# Patient Record
Sex: FEMALE | Race: WHITE | NOT HISPANIC OR LATINO | Employment: FULL TIME | URBAN - METROPOLITAN AREA
[De-identification: names, ages, dates, MRNs, and addresses within clinical notes are randomized per-mention and may not be internally consistent; named-entity substitution may affect disease eponyms.]

---

## 2017-03-21 ENCOUNTER — TRANSFERRED RECORDS (OUTPATIENT)
Dept: HEALTH INFORMATION MANAGEMENT | Facility: CLINIC | Age: 52
End: 2017-03-21
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
## 2018-08-08 ENCOUNTER — OFFICE VISIT (OUTPATIENT)
Dept: FAMILY MEDICINE | Facility: CLINIC | Age: 53
End: 2018-08-08
Payer: COMMERCIAL

## 2018-08-08 VITALS
HEART RATE: 77 BPM | DIASTOLIC BLOOD PRESSURE: 83 MMHG | BODY MASS INDEX: 38.71 KG/M2 | TEMPERATURE: 97.5 F | SYSTOLIC BLOOD PRESSURE: 129 MMHG | RESPIRATION RATE: 12 BRPM | HEIGHT: 71 IN | WEIGHT: 276.5 LBS | OXYGEN SATURATION: 100 %

## 2018-08-08 DIAGNOSIS — Z11.4 SCREENING FOR HIV (HUMAN IMMUNODEFICIENCY VIRUS): ICD-10-CM

## 2018-08-08 DIAGNOSIS — Z00.00 ROUTINE GENERAL MEDICAL EXAMINATION AT A HEALTH CARE FACILITY: Primary | ICD-10-CM

## 2018-08-08 DIAGNOSIS — B00.1 COLD SORE: ICD-10-CM

## 2018-08-08 DIAGNOSIS — Z23 NEED FOR PROPHYLACTIC VACCINATION WITH TETANUS-DIPHTHERIA (TD): ICD-10-CM

## 2018-08-08 DIAGNOSIS — Z12.31 VISIT FOR SCREENING MAMMOGRAM: ICD-10-CM

## 2018-08-08 DIAGNOSIS — Z12.11 SCREEN FOR COLON CANCER: ICD-10-CM

## 2018-08-08 PROCEDURE — 99396 PREV VISIT EST AGE 40-64: CPT | Performed by: NURSE PRACTITIONER

## 2018-08-08 RX ORDER — VALACYCLOVIR HYDROCHLORIDE 1 G/1
2000 TABLET, FILM COATED ORAL 2 TIMES DAILY
Qty: 4 TABLET | Refills: 4 | Status: SHIPPED | OUTPATIENT
Start: 2018-08-08 | End: 2019-09-06

## 2018-08-08 NOTE — TELEPHONE ENCOUNTER
FUTURE VISIT INFORMATION      FUTURE VISIT INFORMATION:    Date: 8/13    Time: 11;00    Location:   REFERRAL INFORMATION:    Referring provider:  SELF    Referring providers clinic:      Reason for visit/diagnosis  LEFT ANKLE PAIN    RECORDS REQUESTED FROM:       Clinic name Comments Records Status Imaging Status   [facility] RECORDS REQUESTED                                     RECORDS STATUS

## 2018-08-08 NOTE — MR AVS SNAPSHOT
After Visit Summary   8/8/2018    Jacqui Grijalva    MRN: [id number]           Patient Information     Date Of Birth          1965        Visit Information        Provider Department      8/8/2018 1:30 PM Bijal Browne APRN [facility]        Today's Diagnoses     Cold sore    -  1    Routine general medical examination at a health care facility        Visit for screening mammogram        Screen for colon cancer        Screening for HIV (human immunodeficiency virus)        Need for prophylactic vaccination with tetanus-diphtheria (TD)          Care Instructions      Preventive Health Recommendations  Female Ages 50 - 64    Yearly exam: See your health care provider every year in order to  o Review health changes.   o Discuss preventive care.    o Review your medicines if your doctor has prescribed any.      Get a Pap test every three years (unless you have an abnormal result and your provider advises testing more often).    If you get Pap tests with HPV test, you only need to test every 5 years, unless you have an abnormal result.     You do not need a Pap test if your uterus was removed (hysterectomy) and you have not had cancer.    You should be tested each year for STDs (sexually transmitted diseases) if you're at risk.     Have a mammogram every 1 to 2 years.    Have a colonoscopy at age 50, or have a yearly FIT test (stool test). These exams screen for colon cancer.      Have a cholesterol test every 5 years, or more often if advised.    Have a diabetes test (fasting glucose) every three years. If you are at risk for diabetes, you should have this test more often.     If you are at risk for osteoporosis (brittle bone disease), think about having a bone density scan (DEXA).    Shots: Get a flu shot each year. Get a tetanus shot every 10 years.    Nutrition:     Eat at least 5 servings of fruits and vegetables each day.    Eat whole-grain bread, whole-wheat pasta and  "brown rice instead of white grains and rice.    Get adequate Calcium and Vitamin D.     Lifestyle    Exercise at least 150 minutes a week (30 minutes a day, 5 days a week). This will help you control your weight and prevent disease.    Limit alcohol to one drink per day.    No smoking.     Wear sunscreen to prevent skin cancer.     See your dentist every six months for an exam and cleaning.    See your eye doctor every 1 to 2 years.            Follow-ups after your visit        Future tests that were ordered for you today     Open Future Orders        Priority Expected Expires Ordered    MA SCREENING DIGITAL BILAT - Future  (s+30) Routine  8/7/2019 8/8/2018    Lipid panel reflex to direct LDL Fasting Routine  8/8/2019 8/8/2018    **Comprehensive metabolic panel FUTURE 1yr Routine 7/9/2019 8/8/2019 8/8/2018    Fecal colorectal cancer screen (FIT) Routine 8/29/2018 10/31/2018 8/8/2018            Who to contact     If you have questions or need follow up information about today's clinic visit or your schedule please contact [facility] directly at [phone].  Normal or non-critical lab and imaging results will be communicated to you by Peter Blueberryhart, letter or phone within 4 business days after the clinic has received the results. If you do not hear from us within 7 days, please contact the clinic through [employer] or phone. If you have a critical or abnormal lab result, we will notify you by phone as soon as possible.  Submit refill requests through [employer] or call your pharmacy and they will forward the refill request to us. Please allow 3 business days for your refill to be completed.          Additional Information About Your Visit        [employer] Information     [employer] lets you send messages to your doctor, view your test results, renew your prescriptions, schedule appointments and more. To sign up, go to [email or web address] . Click on \"Log in\" on the left side of the screen, which will take you to " "the Welcome page. Then click on \"Sign up Now\" on the right side of the page.     You will be asked to enter the access code listed below, as well as some personal information. Please follow the directions to create your username and password.     Your access code is: [email or web address]  Expires: 2018  1:24 PM     Your access code will  in 90 days. If you need help or a new code, please call your [facility] or [phone].        Care EveryWhere ID     This is your Care EveryWhere ID. This could be used by other organizations to access your [place] medical records  [id number]        Your Vitals Were     Pulse Temperature Respirations Height Pulse Oximetry Breastfeeding?    77 97.5  F (36.4  C) (Oral) 12 5' 11.25\" (1.81 m) 100% No    BMI (Body Mass Index)                   38.29 kg/m2            Blood Pressure from Last 3 Encounters:   18 129/83   16 116/78   16 136/78    Weight from Last 3 Encounters:   18 276 lb 8 oz (125.4 kg)   10/19/16 268 lb (121.6 kg)   16 265 lb 14.4 oz (120.6 kg)                 Today's Medication Changes          These changes are accurate as of 18  1:59 PM.  If you have any questions, ask your nurse or doctor.               Start taking these medicines.        Dose/Directions    valACYclovir 1000 mg tablet   Commonly known as:  VALTREX   Used for:  Cold sore   Started by:  Bijal Browne APRN CNP        Dose:  2000 mg   Take 2 tablets (2,000 mg) by mouth 2 times daily   Quantity:  4 tablet   Refills:  4            Where to get your medicines      These medications were sent to [facility] [address] [place] [address] & [facility]  [address] [place] [address]     Phone:  [phone]     valACYclovir 1000 mg tablet                Primary Care Provider Office Phone # Fax #    Lucia VIRAL Contreras [phone]       [address] [place] [facility]  [place] [address]      "   Equal Access to Services     [facility] HospitalLISA : Hadii aad ku hadlucíajerica Smith, waroyada luqadaha, qaybta simongagankathya johnson. So [facility] [phone].    ATENCIÓN: Si habla español, tiene a begum disposición servicios gratuitos de asistencia lingüística. Llame al [phone].    We comply with applicable federal civil rights laws and [place] laws. We do not discriminate on the basis of race, color, national origin, age, disability, sex, sexual orientation, or gender identity.            Thank you!     Thank you for choosing [facility]  for your care. Our goal is always to provide you with excellent care. Hearing back from our patients is one way we can continue to improve our services. Please take a few minutes to complete the written survey that you may receive in the mail after your visit with us. Thank you!             Your Updated Medication List - Protect others around you: Learn how to safely use, store and throw away your medicines at www.disposemymeds.org.          This list is accurate as of 8/8/18  1:59 PM.  Always use your most recent med list.                   Brand Name Dispense Instructions for use Diagnosis    GLUCOSAMINE CHONDROITIN ADV PO           IBUPROFEN PO           Multi-vitamin Tabs tablet      Take 1 tablet by mouth daily        valACYclovir 1000 mg tablet    VALTREX    4 tablet    Take 2 tablets (2,000 mg) by mouth 2 times daily    Cold sore

## 2018-08-13 ENCOUNTER — TELEPHONE (OUTPATIENT)
Dept: ORTHOPEDICS | Facility: CLINIC | Age: 53
End: 2018-08-13

## 2018-08-13 ENCOUNTER — PRE VISIT (OUTPATIENT)
Dept: ORTHOPEDICS | Facility: CLINIC | Age: 53
End: 2018-08-13

## 2018-08-13 ENCOUNTER — MYC REFILL (OUTPATIENT)
Dept: FAMILY MEDICINE | Facility: CLINIC | Age: 53
End: 2018-08-13

## 2018-08-13 ENCOUNTER — RADIANT APPOINTMENT (OUTPATIENT)
Dept: MAMMOGRAPHY | Facility: CLINIC | Age: 53
End: 2018-08-13
Attending: NURSE PRACTITIONER
Payer: COMMERCIAL

## 2018-08-13 ENCOUNTER — RADIANT APPOINTMENT (OUTPATIENT)
Dept: GENERAL RADIOLOGY | Facility: CLINIC | Age: 53
End: 2018-08-13
Attending: FAMILY MEDICINE
Payer: COMMERCIAL

## 2018-08-13 ENCOUNTER — OFFICE VISIT (OUTPATIENT)
Dept: ORTHOPEDICS | Facility: CLINIC | Age: 53
End: 2018-08-13
Payer: COMMERCIAL

## 2018-08-13 VITALS — HEIGHT: 71 IN | RESPIRATION RATE: 16 BRPM | BODY MASS INDEX: 38.64 KG/M2 | WEIGHT: 276 LBS

## 2018-08-13 DIAGNOSIS — M25.572 PAIN OF JOINT OF LEFT ANKLE AND FOOT: ICD-10-CM

## 2018-08-13 DIAGNOSIS — Z12.31 VISIT FOR SCREENING MAMMOGRAM: ICD-10-CM

## 2018-08-13 DIAGNOSIS — B00.1 COLD SORE: ICD-10-CM

## 2018-08-13 DIAGNOSIS — M25.572 PAIN OF JOINT OF LEFT ANKLE AND FOOT: Primary | ICD-10-CM

## 2018-08-13 PROCEDURE — 77067 SCR MAMMO BI INCL CAD: CPT

## 2018-08-13 RX ORDER — VALACYCLOVIR HYDROCHLORIDE 1 G/1
2000 TABLET, FILM COATED ORAL 2 TIMES DAILY
Qty: 4 TABLET | Refills: 4 | Status: CANCELLED | OUTPATIENT
Start: 2018-08-13

## 2018-08-13 NOTE — MR AVS SNAPSHOT
After Visit Summary   8/13/2018    Jacqui Grijalva    MRN: [id number]           Patient Information     Date Of Birth          1965        Visit Information        Provider Department      8/13/2018 11:00 AM Rogelio Grimaldo MD [facility]        Today's Diagnoses     Pain of joint of left ankle and foot    -  1       Follow-ups after your visit        Additional Services     ORTHOTICS REFERRAL       **This referral order prints off in the [facility]  (Orthotics & Prosthetics) Central Scheduling Office**    The [facility] Central Scheduling Staff will contact the patient to schedule appointments.     Central Scheduling Contact Information: [phone] ([place])    Orthotics:   L ankle osteoarthritis:      Please be aware that coverage of these services is subject to the terms and limitations of your health insurance plan.  Call member services at your health plan with any benefit or coverage questions.      Please bring the following to your appointment:    >>   Any x-rays, CTs or MRIs which have been performed.  Contact the facility where they were done to arrange for  prior to your scheduled appointment.    >>   List of current medications   >>   This referral request   >>   Any documents/labs given to you for this referral            PHYSICAL THERAPY REFERRAL (Internal)       Physical Therapy Referral                  Your next 10 appointments already scheduled     Aug 13, 2018  1:30 PM CDT   MA SCREENING DIGITAL BILATERAL with [id number]   [facility] ([facility])    [address] [place] [address], [address]  [facility] [address] [phone]           Do not use any powder, lotion or deodorant under your arms or on your breast. If you do, we will ask you to remove it before your exam.  Wear comfortable, two-piece clothing.  If you have any allergies, tell your care team.  Bring any previous mammograms from other facilities or  "have them mailed to the breast center.            Aug 20, 2018  1:00 PM CDT   (Arrive by 12:45 PM)   Procedure with Rogelio Grimaldo MD   [facility] ([facility] [place])    [address] [place] [address] [phone]              Who to contact     Please call your clinic at [phone] to:    Ask questions about your health    Make or cancel appointments    Discuss your medicines    Learn about your test results    Speak to your doctor            Additional Information About Your Visit        [employer] Information     Define My Style is an electronic gateway that provides easy, online access to your medical records. With [employer] My Style, you can request a clinic appointment, read your test results, renew a prescription or communicate with your care team.     To sign up for [employer] visit the website at [email or web address]   You will be asked to enter the access code listed below, as well as some personal information. Please follow the directions to create your username and password.     Your access code is: [email or web address]  Expires: 2018  1:24 PM     Your access code will  in 90 days. If you need help or a new code, please contact your [facility] or call [phone] for assistance.        Care EveryWhere ID     This is your Care EveryWhere ID. This could be used by other organizations to access your [place] medical records  [id number]        Your Vitals Were     Respirations Height BMI (Body Mass Index)             16 5' 11.25\" (1.81 m) 38.22 kg/m2          Blood Pressure from Last 3 Encounters:   18 129/83   16 116/78   16 136/78    Weight from Last 3 Encounters:   18 276 lb (125.2 kg)   18 276 lb 8 oz (125.4 kg)   10/19/16 268 lb (121.6 kg)              We Performed the Following     ORTHOTICS REFERRAL     PHYSICAL THERAPY REFERRAL (Internal)        Primary Care Provider Office Phone # " Fax #    Lucia Contreras [phone]       [place] [address] [place] [address]        Equal Access to Services     DAIN CROWELL : Hayder bella ku joannao Soomaali, waaxda luqadaha, qaybta kaalmada adeegyada, kathya najeran jenny skinner kanika west. So [facility] [phone].    ATENCIÓN: Si habla español, tiene a begum disposición servicios gratuitos de asistencia lingüística. Llame al [phone].    We comply with applicable federal civil rights laws and [place] laws. We do not discriminate on the basis of race, color, national origin, age, disability, sex, sexual orientation, or gender identity.            Thank you!     Thank you for choosing [facility]  for your care. Our goal is always to provide you with excellent care. Hearing back from our patients is one way we can continue to improve our services. Please take a few minutes to complete the written survey that you may receive in the mail after your visit with us. Thank you!             Your Updated Medication List - Protect others around you: Learn how to safely use, store and throw away your medicines at www.disposemymeds.org.          This list is accurate as of 8/13/18 11:57 AM.  Always use your most recent med list.                   Brand Name Dispense Instructions for use Diagnosis    GLUCOSAMINE CHONDROITIN ADV PO           IBUPROFEN PO           Multi-vitamin Tabs tablet      Take 1 tablet by mouth daily        valACYclovir 1000 mg tablet    VALTREX    4 tablet    Take 2 tablets (2,000 mg) by mouth 2 times daily    Cold sore

## 2018-08-13 NOTE — TELEPHONE ENCOUNTER
[facility] Call Center    Phone Message    May a detailed message be left on voicemail: yes    Reason for Call: Other: Jacqui needs to cancel/reschedule her Ultra Sounded Guided injection currently on 8.20     Action Taken: Message routed to:  [facility] ([facility]): clinic coord sport

## 2018-08-13 NOTE — LETTER
8/13/2018      RE: Jacqui Grijalva  [address]  [address]  [place] [address]        Subjective:   Jacqui Grijalva is a 53 year old female who is c/o chronic left medial ankle pain.   Prior hx of left ankle fx as a child. She states that pain is interfering with her daily activities now and has progressed and is bothering her despite using medications.    Her pain started in the L ankle as a child after she fractured the joint. She has noted she always had some decreased ROM since that fracture as a kid.  Activity limited since her injury.  Activity over the past few walking and biking walks when she can.    Medial ankle pain worse with activity and cold month. Sharp pain. No swelling or clicking.  She has taken medications wiithout relief. She has tried wearing better shoes.   No physical therapy. No injections or imaging. Xray 10 years ago.     Background:   Date of injury: None  Duration of symptoms: years  Mechanism of Injury: Chronic; Unknown   Intensity: 2/10 at rest, 5/10 with activity   Aggravating factors: Activity, cold, exercise  Relieving Factors: Support, tylenol, ibuprofen   Prior Evaluation: Prior Physician Evalutation: [facility], xrays 10 years ago. Considered shots or surgery.    PAST MEDICAL, SOCIAL, SURGICAL AND FAMILY HISTORY: She  has a past medical history of Post-traumatic osteoarthritis of right knee (7/11/2016). She also has no past medical history of Amblyopia; Diabetes (H); Diabetic retinopathy (H); Glaucoma; Hypertension; Macular degeneration; Nonsenile cataract; Retinal detachment; Strabismus; or Uveitis.  She  has no past surgical history on file.  Her family history includes Breast Cancer in her mother; Cancer in her maternal grandmother; Coronary Artery Disease in her father; Diabetes in her father; Eye Surgery in her father and mother; GERD in her mother; Heart Failure in her father; Hypertension in her mother. There is no history of Glaucoma or Macular Degeneration.  She  "reports that she has never smoked. She has never used smokeless tobacco. She reports that she drinks alcohol. She reports that she does not use illicit drugs.    ALLERGIES: She is allergic to penicillins.    CURRENT MEDICATIONS: She has a current medication list which includes the following prescription(s): ibuprofen, misc natural products, multivitamin, therapeutic with minerals, and valacyclovir.     REVIEW OF SYSTEMS: 3 point review of systems is negative except as noted above.     Exam:   Resp 16  Ht 5' 11.25\" (1.81 m)  Wt 276 lb (125.2 kg)  BMI 38.22 kg/m2     CONSTITUTIONAL: healthy, alert and no distress  HEAD: Normocephalic. No masses, lesions, tenderness or abnormalities  SKIN: no suspicious lesions or rashes  GAIT: normal  NEUROLOGIC: Non-focal  PSYCHIATRIC: affect normal/bright and mentation appears normal.    MUSCULOSKELETAL:   Left ankle  Inspection-Bilateral 1+ LE edema  Palpation--nontender atfl, cfl,nontender anterior ankle or midfoot, nontender deltoid or navicular. Tender diffusely medial ankle  AROM mild reduction in subtalar motion compared to R  Strength-no pain with resisted inversion, eversion, dorsiflexion or plantarflexion  1+ DP  Nl sensation    XR L ankle severe degnerative changes of the L ankle     Assessment/Plan:   Chronic L ankle pain.  --L post-traumatic osteoarthritis    Plan  --we discussed options including L ankle injection, bracing, and PT as well as surgical referral.  She will f/u for a L ankle U/S guided injection and then start PT. She will be fitted with an AZ brace or similar orthotic in the meantime. We discussed optomizing weight and activity modification. If not controlled, we could consider surgical referral.    Rogelio Grimaldo MD CAQ      "

## 2018-08-13 NOTE — PROGRESS NOTES
Subjective:   Jacqui Grijalva is a 53 year old female who is c/o chronic left medial ankle pain.   Prior hx of left ankle fx as a child. She states that pain is interfering with her daily activities now and has progressed and is bothering her despite using medications.    Her pain started in the L ankle as a child after she fractured the joint. She has noted she always had some decreased ROM since that fracture as a kid.  Activity limited since her injury.  Activity over the past few walking and biking walks when she can.    Medial ankle pain worse with activity and cold month. Sharp pain. No swelling or clicking.  She has taken medications wiithout relief. She has tried wearing better shoes.   No physical therapy. No injections or imaging. Xray 10 years ago.     Background:   Date of injury: None  Duration of symptoms: years  Mechanism of Injury: Chronic; Unknown   Intensity: 2/10 at rest, 5/10 with activity   Aggravating factors: Activity, cold, exercise  Relieving Factors: Support, tylenol, ibuprofen   Prior Evaluation: Prior Physician Evalutation: [facility], xrays 10 years ago. Considered shots or surgery.    PAST MEDICAL, SOCIAL, SURGICAL AND FAMILY HISTORY: She  has a past medical history of Post-traumatic osteoarthritis of right knee (7/11/2016). She also has no past medical history of Amblyopia; Diabetes (H); Diabetic retinopathy (H); Glaucoma; Hypertension; Macular degeneration; Nonsenile cataract; Retinal detachment; Strabismus; or Uveitis.  She  has no past surgical history on file.  Her family history includes Breast Cancer in her mother; Cancer in her maternal grandmother; Coronary Artery Disease in her father; Diabetes in her father; Eye Surgery in her father and mother; GERD in her mother; Heart Failure in her father; Hypertension in her mother. There is no history of Glaucoma or Macular Degeneration.  She reports that she has never smoked. She has never used smokeless tobacco. She reports that she drinks  "alcohol. She reports that she does not use illicit drugs.    ALLERGIES: She is allergic to penicillins.    CURRENT MEDICATIONS: She has a current medication list which includes the following prescription(s): ibuprofen, misc natural products, multivitamin, therapeutic with minerals, and valacyclovir.     REVIEW OF SYSTEMS: 3 point review of systems is negative except as noted above.     Exam:   Resp 16  Ht 5' 11.25\" (1.81 m)  Wt 276 lb (125.2 kg)  BMI 38.22 kg/m2     CONSTITUTIONAL: healthy, alert and no distress  HEAD: Normocephalic. No masses, lesions, tenderness or abnormalities  SKIN: no suspicious lesions or rashes  GAIT: normal  NEUROLOGIC: Non-focal  PSYCHIATRIC: affect normal/bright and mentation appears normal.    MUSCULOSKELETAL:   Left ankle  Inspection-Bilateral 1+ LE edema  Palpation--nontender atfl, cfl,nontender anterior ankle or midfoot, nontender deltoid or navicular. Tender diffusely medial ankle  AROM mild reduction in subtalar motion compared to R  Strength-no pain with resisted inversion, eversion, dorsiflexion or plantarflexion  1+ DP  Nl sensation    XR L ankle severe degnerative changes of the L ankle     Assessment/Plan:   Chronic L ankle pain.  --L post-traumatic osteoarthritis    Plan  --we discussed options including L ankle injection, bracing, and PT as well as surgical referral.  She will f/u for a L ankle U/S guided injection and then start PT. She will be fitted with an AZ brace or similar orthotic in the meantime. We discussed optomizing weight and activity modification. If not controlled, we could consider surgical referral.    Rogelio Grimaldo MD CAQ      "

## 2018-08-14 NOTE — TELEPHONE ENCOUNTER
Duplicate - 5 refills sent 8/8 - sent mychart encouraging patient to contact pharmacy    Closing encounter - no further actions needed at this time    Armaan Mendez RN

## 2018-08-14 NOTE — TELEPHONE ENCOUNTER
Message from MyChart:  Original authorizing provider: KAYLIE Latham CNP    Jacqui Grijalva would like a refill of the following medications:  valACYclovir (VALTREX) 1000 mg tablet [KAYLIE Latham CNP]    Preferred pharmacy: [facility] [address] [place] [address] & [address]    Comment:

## 2018-08-14 NOTE — TELEPHONE ENCOUNTER
"Requested Prescriptions   Pending Prescriptions Disp Refills     valACYclovir (VALTREX) 1000 mg tablet 4 tablet 4    Last Written Prescription Date:  08/08/2018  Last Fill Quantity: 4  # refills: 4   Last office visit: 8/8/2018 with prescribing provider:  08/08/2018   Future Office Visit:   Sig: Take 2 tablets (2,000 mg) by mouth 2 times daily    Antivirals for Herpes Protocol Failed    8/14/2018  9:39 AM       Failed - Normal serum creatinine on file in past 12 months    No lab results found.         Passed - Patient is age 12 or older       Passed - Recent (12 mo) or future (30 days) visit within the authorizing provider's specialty    Patient had office visit in the last 12 months or has a visit in the next 30 days with authorizing provider or within the authorizing provider's specialty.  See \"Patient Info\" tab in inbasket, or \"Choose Columns\" in Meds & Orders section of the refill encounter.            "

## 2018-08-15 ENCOUNTER — HOSPITAL ENCOUNTER (OUTPATIENT)
Facility: CLINIC | Age: 53
Setting detail: SPECIMEN
Discharge: HOME | End: 2018-08-15
Admitting: NURSE PRACTITIONER
Payer: COMMERCIAL

## 2018-08-15 DIAGNOSIS — Z00.00 ROUTINE GENERAL MEDICAL EXAMINATION AT A HEALTH CARE FACILITY: ICD-10-CM

## 2018-08-15 LAB
ALBUMIN SERPL-MCNC: 3.5 G/DL (ref 3.4–5)
ALP SERPL-CCNC: 68 U/L (ref 40–150)
ALT SERPL W P-5'-P-CCNC: 20 U/L (ref 0–50)
ANION GAP SERPL CALCULATED.3IONS-SCNC: 8 MMOL/L (ref 3–14)
AST SERPL W P-5'-P-CCNC: 13 U/L (ref 0–45)
BILIRUB SERPL-MCNC: 0.3 MG/DL (ref 0.2–1.3)
BUN SERPL-MCNC: 10 MG/DL (ref 7–30)
CALCIUM SERPL-MCNC: 8.6 MG/DL (ref 8.5–10.1)
CHLORIDE SERPL-SCNC: 107 MMOL/L (ref 94–109)
CHOLEST SERPL-MCNC: 203 MG/DL
CO2 SERPL-SCNC: 28 MMOL/L (ref 20–32)
CREAT SERPL-MCNC: 0.93 MG/DL (ref 0.52–1.04)
GFR SERPL CREATININE-BSD FRML MDRD: 63 ML/MIN/1.7M2
GLUCOSE SERPL-MCNC: 86 MG/DL (ref 70–99)
HDLC SERPL-MCNC: 58 MG/DL
LDLC SERPL CALC-MCNC: 130 MG/DL
NONHDLC SERPL-MCNC: 145 MG/DL
POTASSIUM SERPL-SCNC: 4.3 MMOL/L (ref 3.4–5.3)
PROT SERPL-MCNC: 6.8 G/DL (ref 6.8–8.8)
SODIUM SERPL-SCNC: 143 MMOL/L (ref 133–144)
TRIGL SERPL-MCNC: 76 MG/DL

## 2018-08-15 PROCEDURE — 82274 ASSAY TEST FOR BLOOD FECAL: CPT | Performed by: NURSE PRACTITIONER

## 2018-08-15 PROCEDURE — 36415 COLL VENOUS BLD VENIPUNCTURE: CPT | Performed by: NURSE PRACTITIONER

## 2018-08-15 PROCEDURE — 80061 LIPID PANEL: CPT | Performed by: NURSE PRACTITIONER

## 2018-08-15 PROCEDURE — 80053 COMPREHEN METABOLIC PANEL: CPT | Performed by: NURSE PRACTITIONER

## 2018-08-19 DIAGNOSIS — Z12.11 SCREEN FOR COLON CANCER: ICD-10-CM

## 2018-08-19 LAB — HEMOCCULT STL QL IA: NEGATIVE

## 2018-08-27 ENCOUNTER — OFFICE VISIT (OUTPATIENT)
Dept: ORTHOPEDICS | Facility: CLINIC | Age: 53
End: 2018-08-27
Payer: COMMERCIAL

## 2018-08-27 VITALS — HEIGHT: 71 IN | RESPIRATION RATE: 16 BRPM | BODY MASS INDEX: 38.64 KG/M2 | WEIGHT: 276 LBS

## 2018-08-27 DIAGNOSIS — M19.072 LOCALIZED OSTEOARTHRITIS OF ANKLE, LEFT: Primary | ICD-10-CM

## 2018-08-27 RX ORDER — TRIAMCINOLONE ACETONIDE 40 MG/ML
40 INJECTION, SUSPENSION INTRA-ARTICULAR; INTRAMUSCULAR ONCE
Qty: 1 ML | Refills: 0 | OUTPATIENT
Start: 2018-08-27 | End: 2018-08-27

## 2018-08-27 NOTE — MR AVS SNAPSHOT
"              After Visit Summary   8/27/2018    Jacqui Grijalva    MRN: [id number]           Patient Information     Date Of Birth          1965        Visit Information        Provider Department      8/27/2018 11:30 AM Rogelio Grimaldo MD [facility] Sports Medicine        Today's Diagnoses     Localized osteoarthritis of ankle, left    -  1       Follow-ups after your visit        Additional Services     PHYSICAL THERAPY REFERRAL       *This therapy referral will be filtered to a centralized scheduling office at [facility] and the patient will receive a call to schedule an appointment at a [place] location most convenient for them. *  L ankle OA. eval and treat for ROM and strengthening.    [facility] provides Physical Therapy evaluation and treatment and many specialty services across the [place] system.  If requesting a specialty program, please choose from the list below.    If you have not heard from the scheduling office within 2 business days, please call [phone] for all locations, with the exception of [place], please call [phone] and [facility], please call [phone]  Treatment: Evaluation & Treatment  Special Instructions/Modalities:     Please be aware that coverage of these services is subject to the terms and limitations of your health insurance plan.  Call member services at your health plan with any benefit or coverage questions.      **Note to Provider:  If you are referring outside of [place] for the therapy appointment, please list the name of the location in the \"special instructions\" above, print the referral and give to the patient to schedule the appointment.                  Your next 10 appointments already scheduled     Sep 07, 2018  3:40 PM CDT   (Arrive by 3:25 PM)   PORSCHE Extremity with Clayton Lemos   [facility] Physical Therapy [facility] ([facility])    [address] [place] [address] " "[facility] [address] [phone]              Who to contact     Please call your clinic at [phone] to:    Ask questions about your health    Make or cancel appointments    Discuss your medicines    Learn about your test results    Speak to your doctor            Additional Information About Your Visit        MyChart Information     [employer] gives you secure access to your electronic health record. If you see a primary care provider, you can also send messages to your care team and make appointments. If you have questions, please call your primary care clinic.  If you do not have a primary care provider, please call [phone] and they will assist you.      [employer] is an electronic gateway that provides easy, online access to your medical records. With [employer], you can request a clinic appointment, read your test results, renew a prescription or communicate with your care team.     To access your existing account, please contact your [facility] or call [phone] for assistance.        Care EveryWhere ID     This is your Care EveryWhere ID. This could be used by other organizations to access your [place] medical records  [id number]        Your Vitals Were     Respirations Height BMI (Body Mass Index)             16 1.81 m (5' 11.25\") 38.22 kg/m2          Blood Pressure from Last 3 Encounters:   08/08/18 129/83   08/19/16 116/78   06/30/16 136/78    Weight from Last 3 Encounters:   08/27/18 125.2 kg (276 lb)   08/13/18 125.2 kg (276 lb)   08/08/18 125.4 kg (276 lb 8 oz)              We Performed the Following      ARTHROCENTESIS ASPIR&/INJ INTERM JT/BURS W/US     PHYSICAL THERAPY REFERRAL     TRIAMCINOLONE ACET INJ NOS          Today's Medication Changes          These changes are accurate as of 8/27/18  3:20 PM.  If you have any questions, ask your nurse or doctor.               Start taking these medicines.        Dose/Directions    triamcinolone acetonide " 40 MG/ML injection   Commonly known as:  KENALOG   Used for:  Localized osteoarthritis of ankle, left        Dose:  40 mg   1 mL (40 mg) by INTRA-ARTICULAR route once for 1 dose   Quantity:  1 mL   Refills:  0            Where to get your medicines      Some of these will need a paper prescription and others can be bought over the counter.  Ask your nurse if you have questions.     You don't need a prescription for these medications     triamcinolone acetonide 40 MG/ML injection                Primary Care Provider Office Phone # Fax #    Lucia Contreras [phone]       [id number]  [facility] [address]        Equal Access to Services     DAIN CROWELL : Hayder Smith, katie gardner, david anthony, kathya west. So [facility] [phone].    ATENCIÓN: Si habla español, tiene a begum disposición servicios gratuitos de asistencia lingüística. Llame al [phone].    We comply with applicable federal civil rights laws and [place] laws. We do not discriminate on the basis of race, color, national origin, age, disability, sex, sexual orientation, or gender identity.            Thank you!     Thank you for choosing [facility] SPORTS MEDICINE  for your care. Our goal is always to provide you with excellent care. Hearing back from our patients is one way we can continue to improve our services. Please take a few minutes to complete the written survey that you may receive in the mail after your visit with us. Thank you!             Your Updated Medication List - Protect others around you: Learn how to safely use, store and throw away your medicines at www.disposemymeds.org.          This list is accurate as of 8/27/18  3:20 PM.  Always use your most recent med list.                   Brand Name Dispense Instructions for use Diagnosis    GLUCOSAMINE CHONDROITIN ADV PO           IBUPROFEN PO           Multi-vitamin Tabs tablet       Take 1 tablet by mouth daily        triamcinolone acetonide 40 MG/ML injection    KENALOG    1 mL    1 mL (40 mg) by INTRA-ARTICULAR route once for 1 dose    Localized osteoarthritis of ankle, left       valACYclovir 1000 mg tablet    VALTREX    4 tablet    Take 2 tablets (2,000 mg) by mouth 2 times daily    Cold sore

## 2018-08-27 NOTE — PROGRESS NOTES
L ankle injection  Indication OA    After discussing the indications, risks, and expected benefits, a formal consent was obtained.   Short linear probe  In plane approach distal to prox between the tib anterior and EHL.  The joint space was identified ultrasound noted osteophytosis and minimal cartilage and small joint space noted.  tib anterior, EHL and EDL were identified. DP was identified and marked.  pateint prepped sterile and    Initially, 3 mL 1% Lidocaine were injected along the injection path using US guidance.  Using sterile technique and a lateral approach, an ultrasound-guided L ankle injection was performed into the joint space.   Images and video were recorded demonstrating proper needle position. The wound was dressed with a bandaid.  The procedure was well tolerated.  Minimal improvement in symptoms at 2 minutes.  May repeat after 3 months if helpful.  Follow-up as indicated by personal physician.      Rogelio Grimaldo MD CAQ

## 2018-08-27 NOTE — PROGRESS NOTES
" Subjective:   Jacqui Grijalva is a 53 year old female who is here requesting a left ankle ultrasound guided injection for left ankle pain    Date of injury: NA  Date last seen: 8/13/2018  Following Therapeutic Plan: Yes   Pain: Unchanged  Function: Unchanged  Interval History:      PAST MEDICAL, SOCIAL, SURGICAL AND FAMILY HISTORY: {HISTORY:183855267}  ALLERGIES: She is allergic to penicillins.    CURRENT MEDICATIONS: She has a current medication list which includes the following prescription(s): ibuprofen, misc natural products, multivitamin, therapeutic with minerals, and valacyclovir.     REVIEW OF SYSTEMS: {ORTHO ROS:277468841}     Exam:   Resp 16  Ht 5' 11.25\" (1.81 m)  Wt 276 lb (125.2 kg)  BMI 38.22 kg/m2           CONSTITUTIONAL: {GENERAL APPEARANCE:868406::\"healthy\",\"alert\",\"no distress\"}  HEAD: {HEAD EXAM:301::\"Normocephalic. No masses, lesions, tenderness or abnormalities\"}  SKIN: {[facility] SKIN:573619051::\"no suspicious lesions or rashes\"}  GAIT: {EXAM GAIT:965822953::\"normal\"}  NEUROLOGIC: {[facility] NEURO EXAM:029084289::\"Non-focal\"}  PSYCHIATRIC: {BREANA PATIENT PSYCH APPEARANCE:546464::\"mentation appears normal.\",\"affect normal/bright\"}    MUSCULOSKELETAL: ***       Assessment/Plan:   ***  Procedures  X-RAY INTERPRETATION:   {[facility] XRAY INTERP:012596459}  "

## 2018-08-27 NOTE — LETTER
8/27/2018      RE: Jacqui Grijalva  [address]  [place] [address]       L ankle injection  Indication OA    After discussing the indications, risks, and expected benefits, a formal consent was obtained.   Short linear probe  In plane approach distal to prox between the tib anterior and EHL.  The joint space was identified ultrasound noted osteophytosis and minimal cartilage and small joint space noted.  tib anterior, EHL and EDL were identified. DP was identified and marked.  pateint prepped sterile and    Initially, 3 mL 1% Lidocaine were injected along the injection path using US guidance.  Using sterile technique and a lateral approach, an ultrasound-guided L ankle injection was performed into the joint space.   Images and video were recorded demonstrating proper needle position. The wound was dressed with a bandaid.  The procedure was well tolerated.  Minimal improvement in symptoms at 2 minutes.  May repeat after 3 months if helpful.  Follow-up as indicated by personal physician.      Rogelio Grimaldo MD CAQ

## 2018-08-27 NOTE — NURSING NOTE
[address] [address]  Dept: [phone]  ______________________________________________________________________________    Patient: Jaqcui Grijalva   : 1965   MRN: [id number]   2018    INVASIVE PROCEDURE SAFETY CHECKLIST    Date: 18   Procedure:Left ankle ultrasound guided kenalog injection  Patient Name: Jacqui Grijalva  MRN: [id number]  YOB: 1965    Action: Complete sections as appropriate. Any discrepancy results in a HARD COPY until resolved.     PRE PROCEDURE:  Patient ID verified with 2 identifiers (name and  or MRN): Yes  Procedure and site verified with patient/designee (when able): Yes  Accurate consent documentation in medical record: Yes  H&P (or appropriate assessment) documented in medical record: Yes  H&P must be up to 20 days prior to procedure and updates within 24 hours of procedure as applicable: NA  Relevant diagnostic and radiology test results appropriately labeled and displayed as applicable: Yes  Procedure site(s) marked with provider initials: NA    TIMEOUT:  Time-Out performed immediately prior to starting procedure, including verbal and active participation of all team members addressing the following:Yes  * Correct patient identify  * Confirmed that the correct side and site are marked  * An accurate procedure consent form  * Agreement on the procedure to be done  * Correct patient position  * Relevant images and results are properly labeled and appropriately displayed  * The need to administer antibiotics or fluids for irrigation purposes during the procedure as applicable   * Safety precautions based on patient history or medication use    DURING PROCEDURE: Verification of correct person, site, and procedures any time the responsibility for care of the patient is transferred to another member of the care team.     The following medication was given:     MEDICATION:  Kenalog 40 mg; 6ml  of 1% lidocaine  ROUTE: Intra-Articular  SITE: Left ankle  DOSE: Kenalog 40 mg; 6ml of 1% lidocaine  LOT #: Kenalog: [id number]; Lidocaine: -DK  : Kenalog: Sovicell; Lidocaine: Hospira  EXPIRATION DATE: Kenalo20; Lidocaine: 3/1/20  NDC#: Kenalo3090-7091-11; Lidocaine: [address]-0   Was there drug waste? Yes  Amount of drug waste (mL): 14.  Reason for waste:  As per MD Fifi Olivo, ATC  2018

## 2018-09-07 ENCOUNTER — THERAPY VISIT (OUTPATIENT)
Dept: PHYSICAL THERAPY | Facility: CLINIC | Age: 53
End: 2018-09-07
Payer: COMMERCIAL

## 2018-09-07 DIAGNOSIS — M25.572 ANKLE PAIN, LEFT: Primary | ICD-10-CM

## 2018-09-07 PROCEDURE — 97112 NEUROMUSCULAR REEDUCATION: CPT | Mod: GP | Performed by: PHYSICAL THERAPIST

## 2018-09-07 PROCEDURE — 97161 PT EVAL LOW COMPLEX 20 MIN: CPT | Mod: GP | Performed by: PHYSICAL THERAPIST

## 2018-09-07 NOTE — PROGRESS NOTES
[facility] Initial Evaluation  Subjective:  Patient is a 53 year old female presenting with rehab left ankle/foot hpi.   Jacqui Grijalva is a 53 year old female with a left foot and left ankle condition.  Condition occurred with:  Other reason.  Condition occurred: other.  This is a chronic condition     Patient reports pain:  Joint.  Radiates to:  Ankle.  Pain is described as sharp and is intermittent   Associated symptoms:  Loss of strength and loss of motion/stiffness. Pain is worse during the night and worse in the P.M..  Symptoms are exacerbated by walking and relieved by NSAID's.  Since onset symptoms are rapidly improving.        General health as reported by patient is excellent.  Past medical history: depression, osteoarthritis, menopausal, history of fractures, overweight.      Current medications:  None as reported by patient.  Current occupation is RN.    Primary job tasks include:  Prolonged sitting.    Barriers include:  None as reported by patient.    Red flags:  None as reported by patient.                      Pt had an ankle fracture 35 years ago which led to limitations in ROM and sporting activity. She has severe OA in the joint and the pain is worsening. She is getting fit with an orthotic, starting PT, and she just had an injection. She is trying to prolong some sort of surgery. Pain, weakness, and ROM are her primary concerns.     Objective:  Standing Alignment:                Ankle/foot deviations: normal arch, no significant pes cavus/planus.    Gait:    Gait Type:  Normal               Ankle/Foot Evaluation  ROM:  Arom ankle eval: Wall test: 16 cm R, 0 cm L (on wall)  AROM:    Dorsiflexion:  Left:   (1)  Right:   9  Plantarflexion:  Left:  50    Right:  65  Inversion:  Left:  30     Right:  48  Eversion:  10     Right:  15        Strength:    Dorsiflexion:  Left: 5/5     Pain:   Right: 5/5   Pain:  Plantarflexion: Left: 5/5   Pain:   Right: 5/5  Pain:  Inversion:Left: 5/5   Pain:     Right: 5/5  Pain:  Eversion:Left: 5/5  Pain:  Right: 5/5  Pain:                  LIGAMENT TESTING: not assessed                  EDEMA: Edema ankle: Increased swelling noted on L compared to R.            FUNCTIONAL TESTS: Functional test ankle: Difficulty with single leg balance.                                                              General     ROS    Assessment/Plan:    Patient is a 53 year old female with left side ankle complaints.    Patient has the following significant findings with corresponding treatment plan.                Diagnosis 1:  L ankle pain  Pain -  hot/cold therapy  Decreased ROM/flexibility - manual therapy and therapeutic exercise  Decreased joint mobility - manual therapy and therapeutic exercise  Decreased strength - therapeutic exercise and therapeutic activities  Impaired balance - neuro re-education and therapeutic activities  Decreased proprioception - neuro re-education and therapeutic activities  Inflammation - cold therapy  Edema - vasopneumatics  Impaired gait - gait training  Impaired muscle performance - neuro re-education  Decreased function - therapeutic activities    Therapy Evaluation Codes:   1) History comprised of:   Personal factors that impact the plan of care:      Chronic history.    Comorbidity factors that impact the plan of care are:      OA, depression.     Medications impacting care: None.  2) Examination of Body Systems comprised of:   Body structures and functions that impact the plan of care:      Ankle.   Activity limitations that impact the plan of care are:      Walking.  3) Clinical presentation characteristics are:   Stable/Uncomplicated.  4) Decision-Making    Low complexity using standardized patient assessment instrument and/or measureable assessment of functional outcome.  Cumulative Therapy Evaluation is: Low complexity.    Previous and current functional limitations:  (See Goal Flow Sheet for this information)    Short term and Long  term goals: (See Goal Flow Sheet for this information)     Communication ability:  Patient appears to be able to clearly communicate and understand verbal and written communication and follow directions correctly.  Treatment Explanation - The following has been discussed with the patient:   RX ordered/plan of care  Anticipated outcomes  Possible risks and side effects  This patient would benefit from PT intervention to resume normal activities.   Rehab potential is good.    Frequency:  2 X a month, once daily  Duration:  for 12 weeks  Discharge Plan:  Achieve all LTG.  Independent in home treatment program.  Reach maximal therapeutic benefit.    Please refer to the daily flowsheet for treatment today, total treatment time and time spent performing 1:1 timed codes.

## 2018-09-07 NOTE — MR AVS SNAPSHOT
After Visit Summary   9/7/2018    Jacqui Grijalva    MRN: [id number]           Patient Information     Date Of Birth          1965        Visit Information        Provider Department      9/7/2018 3:40 PM Clayton Lemos [facility] Physical Therapy [place]        Today's Diagnoses     Ankle pain, left    -  1       Follow-ups after your visit        Your next 10 appointments already scheduled     Sep 21, 2018  2:20 PM CDT   [facility] Extremity with Clayton MICHAEL [facility] Physical Therapy [place] ([facility] [place])    [address] [place] [address] [phone]              Who to contact     If you have questions or need follow up information about today's clinic visit or your schedule please contact [facility] PHYSICAL THERAPY [place] directly at [phone].  Normal or non-critical lab and imaging results will be communicated to you by MyChart, letter or phone within 4 business days after the clinic has received the results. If you do not hear from us within 7 days, please contact the clinic through MyChart or phone. If you have a critical or abnormal lab result, we will notify you by phone as soon as possible.  Submit refill requests through Iotum or call your pharmacy and they will forward the refill request to us. Please allow 3 business days for your refill to be completed.          Additional Information About Your Visit        MyChart Information     Iotum gives you secure access to your electronic health record. If you see a primary care provider, you can also send messages to your care team and make appointments. If you have questions, please call your primary care clinic.  If you do not have a primary care provider, please call [phone] and they will assist you.        Care EveryWhere ID     This is your Care EveryWhere ID. This could be used by other organizations to access your [place] medical records  [id number]         Blood  Pressure from Last 3 Encounters:   08/08/18 129/83   08/19/16 116/78   06/30/16 136/78    Weight from Last 3 Encounters:   08/27/18 125.2 kg (276 lb)   08/13/18 125.2 kg (276 lb)   08/08/18 125.4 kg (276 lb 8 oz)              We Performed the Following     HC PT EVAL, LOW COMPLEXITY     [facility] INITIAL EVAL REPORT     NEUROMUSCULAR RE-EDUCATION        Primary Care Provider Office Phone # Fax #    Lucia Contreras [phone]       [id number]  [place] [address]        Equal Access to Services     DAIN CROWELL : Hadii jena Smith, wagoyo gardner, david montesalmachristopher anthony, kathya boudreaux . So [facility] [phone].    ATENCIÓN: Si habla español, tiene a begum disposición servicios gratuitos de asistencia lingüística. Llame al [phone].    We comply with applicable federal civil rights laws and [place] laws. We do not discriminate on the basis of race, color, national origin, age, disability, sex, sexual orientation, or gender identity.            Thank you!     Thank you for choosing [facility] PHYSICAL THERAPY [place]  for your care. Our goal is always to provide you with excellent care. Hearing back from our patients is one way we can continue to improve our services. Please take a few minutes to complete the written survey that you may receive in the mail after your visit with us. Thank you!             Your Updated Medication List - Protect others around you: Learn how to safely use, store and throw away your medicines at www.disposemymeds.org.          This list is accurate as of 9/7/18  4:12 PM.  Always use your most recent med list.                   Brand Name Dispense Instructions for use Diagnosis    GLUCOSAMINE CHONDROITIN ADV PO           IBUPROFEN PO           Multi-vitamin Tabs tablet      Take 1 tablet by mouth daily        valACYclovir 1000 mg tablet    VALTREX    4 tablet    Take 2 tablets (2,000 mg) by mouth 2 times daily     Cold sore

## 2018-12-03 ENCOUNTER — OFFICE VISIT (OUTPATIENT)
Dept: FAMILY MEDICINE | Facility: CLINIC | Age: 53
End: 2018-12-03
Payer: COMMERCIAL

## 2018-12-03 VITALS
WEIGHT: 276 LBS | DIASTOLIC BLOOD PRESSURE: 86 MMHG | TEMPERATURE: 97.2 F | BODY MASS INDEX: 38.22 KG/M2 | HEART RATE: 86 BPM | OXYGEN SATURATION: 98 % | RESPIRATION RATE: 16 BRPM | SYSTOLIC BLOOD PRESSURE: 122 MMHG

## 2018-12-03 DIAGNOSIS — F43.22 ADJUSTMENT DISORDER WITH ANXIOUS MOOD: Primary | ICD-10-CM

## 2018-12-03 PROCEDURE — 99213 OFFICE O/P EST LOW 20 MIN: CPT | Performed by: NURSE PRACTITIONER

## 2018-12-03 RX ORDER — CITALOPRAM HYDROBROMIDE 20 MG/1
TABLET ORAL
Qty: 30 TABLET | Refills: 3 | Status: SHIPPED | OUTPATIENT
Start: 2018-12-03 | End: 2022-06-30

## 2018-12-03 RX ORDER — HYDROXYZINE HYDROCHLORIDE 25 MG/1
25-50 TABLET, FILM COATED ORAL EVERY 6 HOURS PRN
Qty: 60 TABLET | Refills: 1 | Status: SHIPPED | OUTPATIENT
Start: 2018-12-03 | End: 2020-03-20

## 2018-12-03 NOTE — PROGRESS NOTES
SUBJECTIVE:   Jacqui Grijalva is a 53 year old female who presents to clinic today for the following health issues:    Abnormal Mood Symptoms  Onset: Since September    Description:   Depression: no  Anxiety: YES    Accompanying Signs & Symptoms:  Still participating in activities that you used to enjoy: YES  Fatigue: YES  Irritability: YES  Difficulty concentrating: YES  Changes in appetite: YES  Problems with sleep: YES  Heart racing/beating fast : no  Thoughts of hurting yourself or others: none    History:   Recent stress: YES  Prior depression hospitalization: None  Family history of depression: YES  Family history of anxiety: YES    Precipitating factors:   Alcohol/drug use: no    Alleviating factors:      Therapies Tried and outcome: Celexa (Citalopram)- pt stated it helped symptoms    -------------------------------------    Problem list and histories reviewed & adjusted, as indicated.  Additional history: as documented    Patient Active Problem List   Diagnosis     Tear film insufficiency     Chronic allergic conjunctivitis - Both Eyes     Post-traumatic osteoarthritis of right knee     Ankle pain, left     No past surgical history on file.    Social History   Substance Use Topics     Smoking status: Never Smoker     Smokeless tobacco: Never Used     Alcohol use Yes      Comment: 3/week     Family History   Problem Relation Age of Onset     Hypertension Mother      Eye Surgery Mother      Breast Cancer Mother      GERD Mother      Diabetes Father      Eye Surgery Father      Coronary Artery Disease Father      Heart Failure Father      Cancer Maternal Grandmother      Glaucoma No family hx of      Macular Degeneration No family hx of            Reviewed and updated as needed this visit by clinical staff       Reviewed and updated as needed this visit by Provider         ROS:  Constitutional, HEENT, cardiovascular, pulmonary, gi and gu systems are negative, except as otherwise noted.    OBJECTIVE:     BP  122/86  Pulse 86  Temp 97.2  F (36.2  C) (Temporal)  Resp 16  Wt 276 lb (125.2 kg)  SpO2 98%  BMI 38.22 kg/m2  Body mass index is 38.22 kg/(m^2).   GENERAL: healthy, alert and no distress  RESP: lungs clear to auscultation - no rales, rhonchi or wheezes  CV: regular rate and rhythm, normal S1 S2, no S3 or S4, no murmur, click or rub, no peripheral edema and peripheral pulses strong  MS: no gross musculoskeletal defects noted, no edema  PSYCH: mentation appears normal, affect normal/bright    Diagnostic Test Results:  No results found for this or any previous visit (from the past 24 hour(s)).    ASSESSMENT/PLAN:     Problem List Items Addressed This Visit     None      Visit Diagnoses     Adjustment disorder with anxious mood    -  Primary    Relevant Medications    hydrOXYzine (ATARAX) 25 MG tablet    citalopram (CELEXA) 20 MG tablet    Other Relevant Orders    MENTAL HEALTH REFERRAL  - Adult; Outpatient Treatment; Individual/Couples/Family/Group Therapy/Health Psychology; [facility]: [facility] [phone]; We will contact you to schedule the appointment or please call with any questions         KAYLIE Latham [facility]

## 2018-12-03 NOTE — MR AVS SNAPSHOT
After Visit Summary   12/3/2018    Jacqui Grijalva    MRN: [id number]           Patient Information     Date Of Birth          1965        Visit Information        Provider Department      12/3/2018 9:00 AM Bijal Browne APRN CNP [facility]        Today's Diagnoses     Adjustment disorder with anxious mood    -  1       Follow-ups after your visit        Who to contact     If you have questions or need follow up information about today's clinic visit or your schedule please contact [facility] directly at [phone].  Normal or non-critical lab and imaging results will be communicated to you by 7signal Solutionshart, letter or phone within 4 business days after the clinic has received the results. If you do not hear from us within 7 days, please contact the clinic through Kloudlesst or phone. If you have a critical or abnormal lab result, we will notify you by phone as soon as possible.  Submit refill requests through SulfurCell or call your pharmacy and they will forward the refill request to us. Please allow 3 business days for your refill to be completed.          Additional Information About Your Visit        MyChart Information     SulfurCell gives you secure access to your electronic health record. If you see a primary care provider, you can also send messages to your care team and make appointments. If you have questions, please call your primary care clinic.  If you do not have a primary care provider, please call [phone] and they will assist you.        Care EveryWhere ID     This is your Care EveryWhere ID. This could be used by other organizations to access your [place] medical records  [id number]        Your Vitals Were     Pulse Temperature Respirations Pulse Oximetry BMI (Body Mass Index)       86 97.2  F (36.2  C) (Temporal) 16 98% 38.22 kg/m2        Blood Pressure from Last 3 Encounters:   12/03/18 122/86   08/08/18 129/83   08/19/16 116/78    Weight from  Last 3 Encounters:   12/03/18 276 lb (125.2 kg)   08/27/18 276 lb (125.2 kg)   08/13/18 276 lb (125.2 kg)              Today, you had the following     No orders found for display         Today's Medication Changes          These changes are accurate as of 12/3/18  9:40 AM.  If you have any questions, ask your nurse or doctor.               Start taking these medicines.        Dose/Directions    citalopram 20 MG tablet   Commonly known as:  celeXA   Used for:  Adjustment disorder with anxious mood   Started by:  Bijal Browne APRN CNP        Take 1/2 tablet (10 mg) for 1-2 weeks, then increase to 1 tablet orally daily   Quantity:  30 tablet   Refills:  3       hydrOXYzine 25 MG tablet   Commonly known as:  ATARAX   Used for:  Adjustment disorder with anxious mood   Started by:  Bijal Browne APRN CNP        Dose:  25-50 mg   Take 1-2 tablets (25-50 mg) by mouth every 6 hours as needed for anxiety   Quantity:  60 tablet   Refills:  1            Where to get your medicines      These medications were sent to [facility] [address] [place] [address] & [facility]  [address] [place] [address]     Phone:  [phone]     citalopram 20 MG tablet    hydrOXYzine 25 MG tablet                Primary Care Provider Office Phone # Fax #    Lucia Contreras [phone]       [address] [address]        Equal Access to Services     MANUEL CROWELL : Hadii aad ku hadasho Soomaali, waaxda luqadaha, qaybta kaalmada adeegyada, waxay martínin haysandeep west. So [facility] [phone].    ATENCIÓN: Si anala elliott, tiene a begum disposición servicios gratuitos de asistencia lingüística. Llame al [phone].    We comply with applicable federal civil rights laws and [place] laws. We do not discriminate on the basis of race, color, national origin, age, disability, sex, sexual orientation, or gender identity.             Thank you!     Thank you for choosing [facility]  for your care. Our goal is always to provide you with excellent care. Hearing back from our patients is one way we can continue to improve our services. Please take a few minutes to complete the written survey that you may receive in the mail after your visit with us. Thank you!             Your Updated Medication List - Protect others around you: Learn how to safely use, store and throw away your medicines at www.disposemymeds.org.          This list is accurate as of 12/3/18  9:40 AM.  Always use your most recent med list.                   Brand Name Dispense Instructions for use Diagnosis    citalopram 20 MG tablet    celeXA    30 tablet    Take 1/2 tablet (10 mg) for 1-2 weeks, then increase to 1 tablet orally daily    Adjustment disorder with anxious mood       GLUCOSAMINE CHONDROITIN ADV PO           hydrOXYzine 25 MG tablet    ATARAX    60 tablet    Take 1-2 tablets (25-50 mg) by mouth every 6 hours as needed for anxiety    Adjustment disorder with anxious mood       IBUPROFEN PO           Multi-vitamin tablet      Take 1 tablet by mouth daily        valACYclovir 1000 mg tablet    VALTREX    4 tablet    Take 2 tablets (2,000 mg) by mouth 2 times daily    Cold sore

## 2018-12-21 ENCOUNTER — OFFICE VISIT (OUTPATIENT)
Dept: DERMATOLOGY | Facility: CLINIC | Age: 53
End: 2018-12-21
Payer: COMMERCIAL

## 2018-12-21 DIAGNOSIS — L91.8 INFLAMED ACROCHORDON: ICD-10-CM

## 2018-12-21 DIAGNOSIS — L82.0 INFLAMED SEBORRHEIC KERATOSIS: ICD-10-CM

## 2018-12-21 DIAGNOSIS — D22.9 MULTIPLE BENIGN NEVI: ICD-10-CM

## 2018-12-21 DIAGNOSIS — D18.01 CHERRY ANGIOMA: ICD-10-CM

## 2018-12-21 DIAGNOSIS — L81.4 SOLAR LENTIGINOSIS: ICD-10-CM

## 2018-12-21 DIAGNOSIS — Z85.828 HISTORY OF BASAL CELL CARCINOMA: ICD-10-CM

## 2018-12-21 DIAGNOSIS — Z12.83 SKIN CANCER SCREENING: Primary | ICD-10-CM

## 2018-12-21 DIAGNOSIS — L82.1 SEBORRHEIC KERATOSIS: ICD-10-CM

## 2018-12-21 ASSESSMENT — PAIN SCALES - GENERAL: PAINLEVEL: NO PAIN (0)

## 2018-12-21 NOTE — LETTER
12/21/2018       RE: Jacqui Grijalva  [address]  [place] [address]     Dear Colleague,    Thank you for referring your patient, Jacqui Grijalva, to the [facility] DERMATOLOGY at [facility]. Please see a copy of my visit note below.    [facility] Dermatology Note      Dermatology Problem List:  1. Hx NMSC ~ 15 years ago   2. ISK, s/p cryo   3. Inflamed acrochordon, s/p cryo   4. Skin cancer screening, 12/21/18      Encounter Date: Dec 21, 2018    CC:  Chief Complaint   Patient presents with     Skin Check     Jacqui is here for skin check.     History of Present Illness:  Ms. Jacqui Grijalva is a 53 year old female who presents today in self referral for a skin check as a new patient. There is no family history of skin cancer or skin diseases. [place] and admits to an average amount of sun exposure over her lifetime. She reports that about 15 years ago she had basal cell carcinoma on her right forearm and right chest, these were removed with ED&C.    Today she reports an area of concern on her scalp. She often finds herself picking at this area. There are similar spots on the back of her legs. These spots are often itchy and bothersome to her. She is unsure the nature of these spots. Additionally, there are a few skin tags that she has found on her back that are bothersome to her. These often catch on bras and clothing.    Otherwise the patient reports no additional painful, bleeding, nonhealing or pruritic lesions and denies any new or changing moles.    Past Medical History:   Patient Active Problem List   Diagnosis     Tear film insufficiency     Chronic allergic conjunctivitis - Both Eyes     Post-traumatic osteoarthritis of right knee     Ankle pain, left     Adjustment disorder with anxious mood     Past Medical History:   Diagnosis Date     Post-traumatic osteoarthritis of right knee 7/11/2016     No past surgical  history on file.    Social History:  Patient reports that  has never smoked. she has never used smokeless tobacco. She reports that she drinks alcohol. She reports that she does not use drugs.   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     Family History:  Family History   Problem Relation Age of Onset     Hypertension Mother      Eye Surgery Mother      Breast Cancer Mother      GERD Mother      Diabetes Father      Eye Surgery Father      Coronary Artery Disease Father      Heart Failure Father      Cancer Maternal Grandmother      Glaucoma No family hx of      Macular Degeneration No family hx of        Medications:  Current Outpatient Medications   Medication Sig Dispense Refill     IBUPROFEN PO        citalopram (CELEXA) 20 MG tablet Take 1/2 tablet (10 mg) for 1-2 weeks, then increase to 1 tablet orally daily (Patient not taking: Reported on 12/21/2018) 30 tablet 3     hydrOXYzine (ATARAX) 25 MG tablet Take 1-2 tablets (25-50 mg) by mouth every 6 hours as needed for anxiety (Patient not taking: Reported on 12/21/2018) 60 tablet 1     Misc Natural Products (GLUCOSAMINE CHONDROITIN ADV PO)        multivitamin, therapeutic with minerals (MULTI-VITAMIN) TABS Take 1 tablet by mouth daily       valACYclovir (VALTREX) 1000 mg tablet Take 2 tablets (2,000 mg) by mouth 2 times daily (Patient not taking: Reported on 12/3/2018) 4 tablet 4       Allergies   Allergen Reactions     Penicillins        Review of Systems:  -Skin/Heme New Pt: The patient denies frequent sun exposure. The patient denies excessive scarring or problems healing except as per HPI. The patient denies excessive bleeding. Skin as per HPI. No additional skin concerns.   -Constitutional: Otherwise feeling well today, in usual state of health.    Physical exam:  Vitals: There were no vitals taken for this visit.  GEN: This is a well developed, well-nourished female in no acute distress, in a pleasant mood.    SKIN: Total skin excluding the  undergarment areas was performed. The exam included the head/face, neck, both arms, chest, back, abdomen, both legs, digits and/or nails. Significant for:   - There is a 1.2 cm waxy tan to brown papule on an erythematous base to the scalp  - There are 2 waxy stuck on tan to brown papules on an erythematous base to the left posterior thigh as well as 1 on the right forearm  -There are waxy stuck on tan to brown papules on the face, trunk and extremities.  -Multiple regular brown pigmented macules and papules are identified on the trunk and extremities.   -Scattered brown macules on sun exposed areas.  - There are dome shaped bright red papules on the trunk.   - There are 2 skin colored pedunculated papules with erythema on the mid back.   - There is are hypopigmented patches on the right forearm and right upper lateral chest- without erythema, nodularity or tenderness  -No other lesions of concern on areas examined.     Impression/Plan:  1. Hx NMSC ~ 15 years ago      No evidence of recurrence     Recommend regular skin checks    2. Inflamed seborrheic keratosis- left thigh x 2, left posterior scalp x 1, right forearm x 1    Cryotherapy procedure note: After verbal consent and discussion of risks and benefits including but no limited to dyspigmentation/scar, blister, and pain, 4 were treated with 1-2mm freeze border for 2 cycles with liquid nitrogen. Post cryotherapy instructions were provided.    3. Inflamed acrochordon-  Mid back x 2    Cryotherapy procedure note: After verbal consent and discussion of risks and benefits including but no limited to dyspigmentation/scar, blister, and pain, two were treated with 1-2mm freeze border for 2 cycles with liquid nitrogen. Post cryotherapy instructions were provided.    4. Cherry angiomas, trunk    Reassured of benign, congenital nature    5. Seborrheic keratosis, non irritated    Reassured of benign nature    No further intervention required    6. Solar lentigines     Sun  precaution was advised including the use of sun screens of SPF 30 or higher, sun protective clothing, and avoidance of tanning beds.    7. Multiple clinically benign nevi on the trunk and extremities     ABCDs of melanoma were discussed and self skin checks were advised.     Follow-up within 3 months to recheck SK on scalp, earlier for new or changing lesions.     Staff Involved:  Scribe/Staff    Scribe Disclosure:   I, Regina Urbina, am serving as a scribe to document services personally performed by Vania Calles PA-C, based on data collection and the provider's statements to me.    Provider Disclosure:   The documentation recorded by the scribe accurately reflects the services I personally performed and the decisions made by me.    All risks, benefits and alternatives were discussed with patient.  Patient is in agreement and understands the assessment and plan.  All questions were answered.  Sun Screen Education was given.   Return to Clinic in 3 months or sooner as needed. Annual skin check needed      Vania Calles PA-C

## 2018-12-21 NOTE — PATIENT INSTRUCTIONS
Cryotherapy    What is it?    Use of a very cold liquid, such as liquid nitrogen, to freeze and destroy abnormal skin cells that need to be removed    What should I expect?    Tenderness and redness    A small blister that might grow and fill with dark purple blood. There may be crusting.    More than one treatment may be needed if the lesions do not go away.    How do I care for the treated area?    Gently wash the area with your hands when bathing.    Use a thin layer of Vaseline to help with healing. You may use a Band-Aid.     The area should heal within 7-10 days and may leave behind a pink or lighter color.     Do not use an antibiotic or Neosporin ointment.     You may take acetaminophen (Tylenol) for pain.     Call your Doctor if you have:    Severe pain    Signs of infection (warmth, redness, cloudy yellow drainage, and or a bad smell)    Questions or concerns    Who should I call with questions?       [facility]: [phone]       [facility]: [phone]       For urgent needs outside of business hours call the [facility] at [phone]        and ask for the dermatology resident on call

## 2018-12-21 NOTE — PROGRESS NOTES
[facility] Dermatology Note      Dermatology Problem List:  1. Hx NMSC ~ 15 years ago   2. ISK, s/p cryo   3. Inflamed acrochordon, s/p cryo   4. Skin cancer screening, 12/21/18      Encounter Date: Dec 21, 2018    CC:  Chief Complaint   Patient presents with     Skin Check     Jacqui is here for skin check.     History of Present Illness:  Ms. Jacqui Grijalva is a 53 year old female who presents today in self referral for a skin check as a new patient. There is no family history of skin cancer or skin diseases. [place] and admits to an average amount of sun exposure over her lifetime. She reports that about 15 years ago she had basal cell carcinoma on her right forearm and right chest, these were removed with ED&C.    Today she reports an area of concern on her scalp. She often finds herself picking at this area. There are similar spots on the back of her legs. These spots are often itchy and bothersome to her. She is unsure the nature of these spots. Additionally, there are a few skin tags that she has found on her back that are bothersome to her. These often catch on bras and clothing.    Otherwise the patient reports no additional painful, bleeding, nonhealing or pruritic lesions and denies any new or changing moles.    Past Medical History:   Patient Active Problem List   Diagnosis     Tear film insufficiency     Chronic allergic conjunctivitis - Both Eyes     Post-traumatic osteoarthritis of right knee     Ankle pain, left     Adjustment disorder with anxious mood     Past Medical History:   Diagnosis Date     Post-traumatic osteoarthritis of right knee 7/11/2016     No past surgical history on file.    Social History:  Patient reports that  has never smoked. she has never used smokeless tobacco. She reports that she drinks alcohol. She reports that she does not use drugs.   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     Family History:  Family History   Problem  Relation Age of Onset     Hypertension Mother      Eye Surgery Mother      Breast Cancer Mother      GERD Mother      Diabetes Father      Eye Surgery Father      Coronary Artery Disease Father      Heart Failure Father      Cancer Maternal Grandmother      Glaucoma No family hx of      Macular Degeneration No family hx of        Medications:  Current Outpatient Medications   Medication Sig Dispense Refill     IBUPROFEN PO        citalopram (CELEXA) 20 MG tablet Take 1/2 tablet (10 mg) for 1-2 weeks, then increase to 1 tablet orally daily (Patient not taking: Reported on 12/21/2018) 30 tablet 3     hydrOXYzine (ATARAX) 25 MG tablet Take 1-2 tablets (25-50 mg) by mouth every 6 hours as needed for anxiety (Patient not taking: Reported on 12/21/2018) 60 tablet 1     Misc Natural Products (GLUCOSAMINE CHONDROITIN ADV PO)        multivitamin, therapeutic with minerals (MULTI-VITAMIN) TABS Take 1 tablet by mouth daily       valACYclovir (VALTREX) 1000 mg tablet Take 2 tablets (2,000 mg) by mouth 2 times daily (Patient not taking: Reported on 12/3/2018) 4 tablet 4       Allergies   Allergen Reactions     Penicillins        Review of Systems:  -Skin/Heme New Pt: The patient denies frequent sun exposure. The patient denies excessive scarring or problems healing except as per HPI. The patient denies excessive bleeding. Skin as per HPI. No additional skin concerns.   -Constitutional: Otherwise feeling well today, in usual state of health.    Physical exam:  Vitals: There were no vitals taken for this visit.  GEN: This is a well developed, well-nourished female in no acute distress, in a pleasant mood.    SKIN: Total skin excluding the undergarment areas was performed. The exam included the head/face, neck, both arms, chest, back, abdomen, both legs, digits and/or nails. Significant for:   - There is a 1.2 cm waxy tan to brown papule on an erythematous base to the scalp  - There are 2 waxy stuck on tan to brown papules on an  erythematous base to the left posterior thigh as well as 1 on the right forearm  -There are waxy stuck on tan to brown papules on the face, trunk and extremities.  -Multiple regular brown pigmented macules and papules are identified on the trunk and extremities.   -Scattered brown macules on sun exposed areas.  - There are dome shaped bright red papules on the trunk.   - There are 2 skin colored pedunculated papules with erythema on the mid back.   - There is are hypopigmented patches on the right forearm and right upper lateral chest- without erythema, nodularity or tenderness  -No other lesions of concern on areas examined.     Impression/Plan:  1. Hx NMSC ~ 15 years ago      No evidence of recurrence     Recommend regular skin checks    2. Inflamed seborrheic keratosis- left thigh x 2, left posterior scalp x 1, right forearm x 1    Cryotherapy procedure note: After verbal consent and discussion of risks and benefits including but no limited to dyspigmentation/scar, blister, and pain, 4 were treated with 1-2mm freeze border for 2 cycles with liquid nitrogen. Post cryotherapy instructions were provided.    3. Inflamed acrochordon-  Mid back x 2    Cryotherapy procedure note: After verbal consent and discussion of risks and benefits including but no limited to dyspigmentation/scar, blister, and pain, two were treated with 1-2mm freeze border for 2 cycles with liquid nitrogen. Post cryotherapy instructions were provided.    4. Cherry angiomas, trunk    Reassured of benign, congenital nature    5. Seborrheic keratosis, non irritated    Reassured of benign nature    No further intervention required    6. Solar lentigines     Sun precaution was advised including the use of sun screens of SPF 30 or higher, sun protective clothing, and avoidance of tanning beds.    7. Multiple clinically benign nevi on the trunk and extremities     ABCDs of melanoma were discussed and self skin checks were advised.     Follow-up within 3  months to recheck SK on scalp, earlier for new or changing lesions.     Staff Involved:  Scribe/Staff    Scribe Disclosure:   I, Regina rUbina, am serving as a scribe to document services personally performed by aVnia Calles PA-C, based on data collection and the provider's statements to me.    Provider Disclosure:   The documentation recorded by the scribe accurately reflects the services I personally performed and the decisions made by me.    All risks, benefits and alternatives were discussed with patient.  Patient is in agreement and understands the assessment and plan.  All questions were answered.  Sun Screen Education was given.   Return to Clinic in 3 months or sooner as needed. Annual skin check needed  Vania Calles PA-C   [facility]

## 2018-12-21 NOTE — NURSING NOTE
Dermatology Rooming Note    Jacqui Grijalva's goals for this visit include:   Chief Complaint   Patient presents with     Skin Check     Jacqui is here for skin check.     Vilma Austin, CMA

## 2019-02-06 ENCOUNTER — OFFICE VISIT (OUTPATIENT)
Dept: DERMATOLOGY | Facility: CLINIC | Age: 54
End: 2019-02-06
Payer: COMMERCIAL

## 2019-02-06 DIAGNOSIS — L91.8 INFLAMED ACROCHORDON: ICD-10-CM

## 2019-02-06 DIAGNOSIS — D48.5 NEOPLASM OF UNCERTAIN BEHAVIOR OF SKIN: ICD-10-CM

## 2019-02-06 DIAGNOSIS — L82.0 INFLAMED SEBORRHEIC KERATOSIS: Primary | ICD-10-CM

## 2019-02-06 DIAGNOSIS — L81.4 SOLAR LENTIGINOSIS: ICD-10-CM

## 2019-02-06 ASSESSMENT — PAIN SCALES - GENERAL: PAINLEVEL: NO PAIN (0)

## 2019-02-06 NOTE — NURSING NOTE
Dermatology Rooming Note    Jacqui Grijalva's goals for this visit include:   Chief Complaint   Patient presents with     Derm Problem     Cryo follow up, Jacqui notes she still has some rough lesions.      Merced Muñiz LPN

## 2019-02-06 NOTE — PROGRESS NOTES
[facility] Dermatology Note      Dermatology Problem List:  1. Hx NMSC ~ 15 years ago right forearm and right chest tx ED&C  2. ISK, s/p cryo   3. Inflamed acrochordon, s/p cryo   4. Skin cancer screening, 12/21/18      Encounter Date: Feb 6, 2019    CC:  Chief Complaint   Patient presents with     Derm Problem     Cryo follow up, Jacqui notes she still has some rough lesions.      History of Present Illness:  Ms. Jacqui Grijalva is a 53 year old female who presents today after 2 months for a follow up of cryotherapy. She was last seen 12/21/18 when she had a skin cancer screening and treated inflamed acrochrodons with cryotherapy on her back x 2 and inflamed seborrheic keratoses on her right forearm, left post auricular scalp and left thigh x 2 . She states she thinks she has a skin tag that was not treated on her back and this become irritated when rubbing against her clothing.  The area behind her left ear has improved significantly but is still there and needs treated further and she finds herself picking at this spot. There areas on her left thigh resolved. The area on the right forearm has not changed. It is not painful or bleeding. Otherwise the patient reports no additional painful, bleeding, nonhealing or pruritic lesions and denies any new or changing moles.    Past Medical History:   Patient Active Problem List   Diagnosis     Tear film insufficiency     Chronic allergic conjunctivitis - Both Eyes     Post-traumatic osteoarthritis of right knee     Ankle pain, left     Adjustment disorder with anxious mood     Past Medical History:   Diagnosis Date     Post-traumatic osteoarthritis of right knee 7/11/2016     No past surgical history on file.    Social History:  Patient reports that  has never smoked. she has never used smokeless tobacco. She reports that she drinks alcohol. She reports that she does not use drugs.   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     Family  History:  Family History   Problem Relation Age of Onset     Hypertension Mother      Eye Surgery Mother      Breast Cancer Mother      GERD Mother      Diabetes Father      Eye Surgery Father      Coronary Artery Disease Father      Heart Failure Father      Cancer Maternal Grandmother      Glaucoma No family hx of      Macular Degeneration No family hx of        Medications:  Current Outpatient Medications   Medication Sig Dispense Refill     citalopram (CELEXA) 20 MG tablet Take 1/2 tablet (10 mg) for 1-2 weeks, then increase to 1 tablet orally daily (Patient not taking: Reported on 12/21/2018) 30 tablet 3     hydrOXYzine (ATARAX) 25 MG tablet Take 1-2 tablets (25-50 mg) by mouth every 6 hours as needed for anxiety (Patient not taking: Reported on 12/21/2018) 60 tablet 1     IBUPROFEN PO        Misc Natural Products (GLUCOSAMINE CHONDROITIN ADV PO)        multivitamin, therapeutic with minerals (MULTI-VITAMIN) TABS Take 1 tablet by mouth daily       valACYclovir (VALTREX) 1000 mg tablet Take 2 tablets (2,000 mg) by mouth 2 times daily (Patient not taking: Reported on 12/3/2018) 4 tablet 4       Allergies   Allergen Reactions     Penicillins        Review of Systems:  -Skin/Heme New Pt: The patient denies frequent sun exposure. The patient denies excessive scarring or problems healing except as per HPI. The patient denies excessive bleeding. Skin as per HPI. No additional skin concerns.   -Constitutional: Otherwise feeling well today, in usual state of health.    Physical exam:  Vitals: There were no vitals taken for this visit.  GEN: This is a well developed, well-nourished female in no acute distress, in a pleasant mood.    SKIN: Focus exam of the back, forearms, hands and scalp was performed.  Significant for:   - There is a 1.2 cm waxy tan to brown papule on an erythematous base to the left post auricular scalp, now in 3 different papules.   - There is a pearly 1 cm plaque on the right forearm   -Scattered  brown macules on sun exposed areas.   - There is a skin colored pedunculated papules with erythema on the central back.   -No other lesions of concern on areas examined.     Impression/Plan:  1. Hx NMSC ~ 15 years ago      No evidence of recurrence     Recommend regular skin checks    2. Inflamed seborrheic keratosis- left posterior scalp x 1    Cryotherapy procedure note: After verbal consent and discussion of risks and benefits including but no limited to dyspigmentation/scar, blister, and pain, 1 was treated with 1-2mm freeze border for 2 cycles with liquid nitrogen. Post cryotherapy instructions were provided.    3. Inflamed acrochordon-  central back x 1    Cryotherapy procedure note: After verbal consent and discussion of risks and benefits including but no limited to dyspigmentation/scar, blister, and pain,one was treated with 1-2mm freeze border for 2 cycles with liquid nitrogen. Post cryotherapy instructions were provided.      4. Solar lentigines     Sun precaution was advised including the use of sun screens of SPF 30 or higher, sun protective clothing, and avoidance of tanning beds.    5. 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 R/o bcc. Shave bx to determine management.    After discussion of benefits and risks including but not limited to bleeding, infection, scar, incomplete removal, recurrence, and non-diagnostic biopsy, written consent and photographs were obtained. The area was cleaned with isopropyl alcohol. 0.5 mL of 1% lidocaine with epinephrine was injected to obtain adequate anesthesia of the lesion on the right forearm. A shave biopsy was performed. Hemostasis was achieved with aluminium chloride. Vaseline and a sterile dressing were applied. The patient tolerated the procedure and no complications were noted. The patient was provided with verbal and written post care instructions.     Staff Involved:    All risks, benefits and alternatives were discussed  with patient.  Patient is in agreement and understands the assessment and plan.  All questions were answered.  Sun Screen Education was given.   Return to Clinic for Annual skin check in December 2019.   Vania Calles PA-C   [facility]

## 2019-02-06 NOTE — LETTER
2/6/2019       RE: Jacqui Grijalva  [address]  [address]  [place] [address]     Dear Colleague,    Thank you for referring your patient, Jacqui Grijalva, to the [facility] DERMATOLOGY at [facility]. Please see a copy of my visit note below.    [facility] Dermatology Note      Dermatology Problem List:  1. Hx NMSC ~ 15 years ago right forearm and right chest tx ED&C  2. ISK, s/p cryo   3. Inflamed acrochordon, s/p cryo   4. Skin cancer screening, 12/21/18      Encounter Date: Feb 6, 2019    CC:  Chief Complaint   Patient presents with     Derm Problem     Cryo follow up, Jacqui notes she still has some rough lesions.      History of Present Illness:  Ms. Jacqui Grijalva is a 53 year old female who presents today after 2 months for a follow up of cryotherapy. She was last seen 12/21/18 when she had a skin cancer screening and treated inflamed acrochrodons with cryotherapy on her back x 2 and inflamed seborrheic keratoses on her right forearm, left post auricular scalp and left thigh x 2 . She states she thinks she has a skin tag that was not treated on her back and this become irritated when rubbing against her clothing.  The area behind her left ear has improved significantly but is still there and needs treated further and she finds herself picking at this spot. There areas on her left thigh resolved. The area on the right forearm has not changed. It is not painful or bleeding. Otherwise the patient reports no additional painful, bleeding, nonhealing or pruritic lesions and denies any new or changing moles.    Past Medical History:   Patient Active Problem List   Diagnosis     Tear film insufficiency     Chronic allergic conjunctivitis - Both Eyes     Post-traumatic osteoarthritis of right knee     Ankle pain, left     Adjustment disorder with anxious mood     Past Medical History:   Diagnosis Date     Post-traumatic osteoarthritis of right knee  7/11/2016     No past surgical history on file.    Social History:  Patient reports that  has never smoked. she has never used smokeless tobacco. She reports that she drinks alcohol. She reports that she does not use drugs.   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     Family History:  Family History   Problem Relation Age of Onset     Hypertension Mother      Eye Surgery Mother      Breast Cancer Mother      GERD Mother      Diabetes Father      Eye Surgery Father      Coronary Artery Disease Father      Heart Failure Father      Cancer Maternal Grandmother      Glaucoma No family hx of      Macular Degeneration No family hx of        Medications:  Current Outpatient Medications   Medication Sig Dispense Refill     citalopram (CELEXA) 20 MG tablet Take 1/2 tablet (10 mg) for 1-2 weeks, then increase to 1 tablet orally daily (Patient not taking: Reported on 12/21/2018) 30 tablet 3     hydrOXYzine (ATARAX) 25 MG tablet Take 1-2 tablets (25-50 mg) by mouth every 6 hours as needed for anxiety (Patient not taking: Reported on 12/21/2018) 60 tablet 1     IBUPROFEN PO        Misc Natural Products (GLUCOSAMINE CHONDROITIN ADV PO)        multivitamin, therapeutic with minerals (MULTI-VITAMIN) TABS Take 1 tablet by mouth daily       valACYclovir (VALTREX) 1000 mg tablet Take 2 tablets (2,000 mg) by mouth 2 times daily (Patient not taking: Reported on 12/3/2018) 4 tablet 4       Allergies   Allergen Reactions     Penicillins        Review of Systems:  -Skin/Heme New Pt: The patient denies frequent sun exposure. The patient denies excessive scarring or problems healing except as per HPI. The patient denies excessive bleeding. Skin as per HPI. No additional skin concerns.   -Constitutional: Otherwise feeling well today, in usual state of health.    Physical exam:  Vitals: There were no vitals taken for this visit.  GEN: This is a well developed, well-nourished female in no acute distress, in a pleasant mood.    SKIN:  Focus exam of the back, forearms, hands and scalp was performed.  Significant for:   - There is a 1.2 cm waxy tan to brown papule on an erythematous base to the left post auricular scalp, now in 3 different papules.   - There is a pearly 1 cm plaque on the right forearm   -Scattered brown macules on sun exposed areas.   - There is a skin colored pedunculated papules with erythema on the central back.   -No other lesions of concern on areas examined.     Impression/Plan:  1. Hx NMSC ~ 15 years ago      No evidence of recurrence     Recommend regular skin checks    2. Inflamed seborrheic keratosis- left posterior scalp x 1    Cryotherapy procedure note: After verbal consent and discussion of risks and benefits including but no limited to dyspigmentation/scar, blister, and pain, 1 was treated with 1-2mm freeze border for 2 cycles with liquid nitrogen. Post cryotherapy instructions were provided.    3. Inflamed acrochordon-  central back x 1    Cryotherapy procedure note: After verbal consent and discussion of risks and benefits including but no limited to dyspigmentation/scar, blister, and pain,one was treated with 1-2mm freeze border for 2 cycles with liquid nitrogen. Post cryotherapy instructions were provided.      4. Solar lentigines     Sun precaution was advised including the use of sun screens of SPF 30 or higher, sun protective clothing, and avoidance of tanning beds.    5. 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 R/o bcc. Shave bx to determine management.    After discussion of benefits and risks including but not limited to bleeding, infection, scar, incomplete removal, recurrence, and non-diagnostic biopsy, written consent and photographs were obtained. The area was cleaned with isopropyl alcohol. 0.5 mL of 1% lidocaine with epinephrine was injected to obtain adequate anesthesia of the lesion on the right forearm. A shave biopsy was performed. Hemostasis was achieved with  aluminium chloride. Vaseline and a sterile dressing were applied. The patient tolerated the procedure and no complications were noted. The patient was provided with verbal and written post care instructions.     Staff Involved:    All risks, benefits and alternatives were discussed with patient.  Patient is in agreement and understands the assessment and plan.  All questions were answered.  Sun Screen Education was given.   Return to Clinic for Annual skin check in December 2019.     Vania Calles PA-C   [facility]

## 2019-02-06 NOTE — NURSING NOTE
Biopsy performed on the R forearm. Injected 0.5 ml of Xylocaine  (Lidocaine 1% and Epinephrine  (1:100,000)). Bandaid and Vaseline applied.        Present for procedure:  NOEMY Granger, EMT

## 2019-02-06 NOTE — PATIENT INSTRUCTIONS
Wound Care After a Biopsy    What is a skin biopsy?  A skin biopsy allows the doctor to examine a very small piece of tissue under the microscope to determine the diagnosis and the best treatment for the skin condition. A local anesthetic (numbing medicine)  is injected with a very small needle into the skin area to be tested. A small piece of skin is taken from the area. Sometimes a suture (stitch) is used.     What are the risks of a skin biopsy?  I will experience scar, bleeding, swelling, pain, crusting and redness. I may experience incomplete removal or recurrence. Risks of this procedure are excessive bleeding, bruising, infection, nerve damage, numbness, thick (hypertrophic or keloidal) scar and non-diagnostic biopsy.    How should I care for my wound for the first 24 hours?    Keep the wound dry and covered for 24 hours    If it bleeds, hold direct pressure on the area for 15 minutes. If bleeding does not stop then go to the emergency room    Avoid strenuous exercise the first 1-2 days or as your doctor instructs you    How should I care for the wound after 24 hours?    After 24 hours, remove the bandage    You may bathe or shower as normal    If you had a scalp biopsy, you can shampoo as usual and can use shower water to clean the biopsy site daily    Clean the wound twice a day with gentle soap and water    Do not scrub, be gentle    Apply white petroleum/Vaseline after cleaning the wound with a cotton swab or a clean finger, and keep the site covered with a Bandaid /bandage. Bandages are not necessary with a scalp biopsy    If you are unable to cover the site with a Bandaid /bandage, re-apply ointment 2-3 times a day to keep the site moist. Moisture will help with healing    Avoid strenuous activity for first 1-2 days    Avoid lakes, rivers, pools, and oceans until the stitches are removed or the site is healed    How do I clean my wound?    Wash hands thoroughly with soap or use hand  before all  wound care    Clean the wound with gentle soap and water    Apply white petroleum/Vaseline  to wound after it is clean    Replace the Bandaid /bandage to keep the wound covered for the first few days or as instructed by your doctor    If you had a scalp biopsy, warm shower water to the area on a daily basis should suffice    What should I use to clean my wound?     Cotton-tipped applicators (Qtips )    White petroleum jelly (Vaseline ). Use a clean new container and use Q-tips to apply.    Bandaids   as needed    Gentle soap     How should I care for my wound long term?    Do not get your wound dirty    Keep up with wound care for one week or until the area is healed.    A small scab will form and fall off by itself when the area is completely healed. The area will be red and will become pink in color as it heals. Sun protection is very important for how your scar will turn out. Sunscreen with an SPF 30 or greater is recommended once the area is healed.    You should have some soreness but it should be mild and slowly go away over several days. Talk to your doctor about using tylenol for pain,    When should I call my doctor?  If you have increased:     Pain or swelling    Pus or drainage (clear or slightly yellow drainage is ok)    Temperature over 100F    Spreading redness or warmth around wound    When will I hear about my results?  The biopsy results can take 2-3 weeks to come back. The clinic will call you with the results, send you a [employer] message, or have you schedule a follow-up clinic or phone time to discuss the results. Contact our clinics if you do not hear from us in 3 weeks.     Who should I call with questions?    [facility]: [phone]     [facility]: [phone]    For urgent needs outside of business hours call the [facility] at [phone] and ask for the dermatology resident on call  Cryotherapy    What is it?    Use of  a very cold liquid, such as liquid nitrogen, to freeze and destroy abnormal skin cells that need to be removed    What should I expect?    Tenderness and redness    A small blister that might grow and fill with dark purple blood. There may be crusting.    More than one treatment may be needed if the lesions do not go away.    How do I care for the treated area?    Gently wash the area with your hands when bathing.    Use a thin layer of Vaseline to help with healing. You may use a Band-Aid.     The area should heal within 7-10 days and may leave behind a pink or lighter color.     Do not use an antibiotic or Neosporin ointment.     You may take acetaminophen (Tylenol) for pain.     Call your Doctor if you have:    Severe pain    Signs of infection (warmth, redness, cloudy yellow drainage, and or a bad smell)    Questions or concerns    Who should I call with questions?       [facility]: [phone]       [facility]: [phone]       For urgent needs outside of business hours call the [facility] at [phone]        and ask for the dermatology resident on call

## 2019-02-07 LAB — COPATH REPORT: NORMAL

## 2019-09-06 DIAGNOSIS — B00.1 COLD SORE: ICD-10-CM

## 2019-09-06 NOTE — TELEPHONE ENCOUNTER
"Requested Prescriptions   Pending Prescriptions Disp Refills     valACYclovir (VALTREX) 1000 mg tablet [Pharmacy Med Name: VALACYCLOVIR 1GM TABLETS] 4 tablet 0     Sig: TAKE 2 TABLETS(2000 MG) BY MOUTH TWICE DAILY         Last Written Prescription Date:  8/8/19  Last Fill Quantity: 4,   # refills: 4  Last Office Visit: 12/3/18  Future Office visit:       Routing refill request to provider for review/approval because:  Do you want to update labs for intermittent use?      Antivirals for Herpes Protocol Failed - 9/6/2019  7:52 AM        Failed - Normal serum creatinine on file in past 12 months     Recent Labs   Lab Test 08/15/18  0824   CR 0.93             Passed - Patient is age 12 or older        Passed - Recent (12 mo) or future (30 days) visit within the authorizing provider's specialty     Patient had office visit in the last 12 months or has a visit in the next 30 days with authorizing provider or within the authorizing provider's specialty.  See \"Patient Info\" tab in inbasket, or \"Choose Columns\" in Meds & Orders section of the refill encounter.              Passed - Medication is active on med list          "

## 2019-09-09 RX ORDER — VALACYCLOVIR HYDROCHLORIDE 1 G/1
TABLET, FILM COATED ORAL
Qty: 4 TABLET | Refills: 0 | Status: SHIPPED | OUTPATIENT
Start: 2019-09-09 | End: 2020-09-23

## 2019-09-30 ENCOUNTER — HEALTH MAINTENANCE LETTER (OUTPATIENT)
Age: 54
End: 2019-09-30

## 2020-02-27 ENCOUNTER — OFFICE VISIT (OUTPATIENT)
Dept: FAMILY MEDICINE | Facility: CLINIC | Age: 55
End: 2020-02-27
Payer: COMMERCIAL

## 2020-02-27 VITALS
WEIGHT: 270.56 LBS | TEMPERATURE: 98.3 F | OXYGEN SATURATION: 100 % | HEART RATE: 94 BPM | DIASTOLIC BLOOD PRESSURE: 80 MMHG | SYSTOLIC BLOOD PRESSURE: 118 MMHG | HEIGHT: 71 IN | RESPIRATION RATE: 18 BRPM | BODY MASS INDEX: 37.88 KG/M2

## 2020-02-27 DIAGNOSIS — J36 TONSILLAR ABSCESS: Primary | ICD-10-CM

## 2020-02-27 DIAGNOSIS — R68.89 FLU-LIKE SYMPTOMS: ICD-10-CM

## 2020-02-27 DIAGNOSIS — J35.1 TONSILLAR HYPERTROPHY: ICD-10-CM

## 2020-02-27 LAB
BASOPHILS # BLD AUTO: 0 10E9/L (ref 0–0.2)
BASOPHILS NFR BLD AUTO: 0.3 %
DEPRECATED S PYO AG THROAT QL EIA: NEGATIVE
DIFFERENTIAL METHOD BLD: ABNORMAL
EOSINOPHIL # BLD AUTO: 0.1 10E9/L (ref 0–0.7)
EOSINOPHIL NFR BLD AUTO: 1.5 %
ERYTHROCYTE [DISTWIDTH] IN BLOOD BY AUTOMATED COUNT: 14.4 % (ref 10–15)
FLUAV+FLUBV AG SPEC QL: NEGATIVE
FLUAV+FLUBV AG SPEC QL: NEGATIVE
HCT VFR BLD AUTO: 39.1 % (ref 35–47)
HETEROPH AB SER QL: NEGATIVE
HGB BLD-MCNC: 12.8 G/DL (ref 11.7–15.7)
LYMPHOCYTES # BLD AUTO: 0.8 10E9/L (ref 0.8–5.3)
LYMPHOCYTES NFR BLD AUTO: 10.5 %
MCH RBC QN AUTO: 26 PG (ref 26.5–33)
MCHC RBC AUTO-ENTMCNC: 32.7 G/DL (ref 31.5–36.5)
MCV RBC AUTO: 79 FL (ref 78–100)
MONOCYTES # BLD AUTO: 1 10E9/L (ref 0–1.3)
MONOCYTES NFR BLD AUTO: 12.2 %
NEUTROPHILS # BLD AUTO: 6 10E9/L (ref 1.6–8.3)
NEUTROPHILS NFR BLD AUTO: 75.5 %
PLATELET # BLD AUTO: 211 10E9/L (ref 150–450)
RBC # BLD AUTO: 4.93 10E12/L (ref 3.8–5.2)
SPECIMEN SOURCE: NORMAL
STREP GROUP A PCR: NOT DETECTED
WBC # BLD AUTO: 7.9 10E9/L (ref 4–11)

## 2020-02-27 PROCEDURE — 85025 COMPLETE CBC W/AUTO DIFF WBC: CPT | Performed by: NURSE PRACTITIONER

## 2020-02-27 PROCEDURE — 99214 OFFICE O/P EST MOD 30 MIN: CPT | Performed by: NURSE PRACTITIONER

## 2020-02-27 PROCEDURE — 87804 INFLUENZA ASSAY W/OPTIC: CPT | Performed by: NURSE PRACTITIONER

## 2020-02-27 PROCEDURE — 40001204 ZZHCL STATISTIC STREP A RAPID: Performed by: NURSE PRACTITIONER

## 2020-02-27 PROCEDURE — 87651 STREP A DNA AMP PROBE: CPT | Performed by: NURSE PRACTITIONER

## 2020-02-27 PROCEDURE — 36415 COLL VENOUS BLD VENIPUNCTURE: CPT | Performed by: NURSE PRACTITIONER

## 2020-02-27 PROCEDURE — 86308 HETEROPHILE ANTIBODY SCREEN: CPT | Performed by: NURSE PRACTITIONER

## 2020-02-27 RX ORDER — CLINDAMYCIN HCL 300 MG
300 CAPSULE ORAL 4 TIMES DAILY
Qty: 56 CAPSULE | Refills: 0 | Status: SHIPPED | OUTPATIENT
Start: 2020-02-27 | End: 2020-03-12

## 2020-02-27 RX ORDER — HYDROCODONE BITARTRATE AND ACETAMINOPHEN 5; 325 MG/1; MG/1
1 TABLET ORAL EVERY 6 HOURS PRN
Qty: 10 TABLET | Refills: 0 | Status: SHIPPED | OUTPATIENT
Start: 2020-02-27 | End: 2020-03-01

## 2020-02-27 ASSESSMENT — MIFFLIN-ST. JEOR: SCORE: 1931.26

## 2020-02-27 NOTE — PATIENT INSTRUCTIONS
Patient Education     Peritonsillar Abscess    A peritonsillar abscess is a collection of pus that forms near the tonsils. It is a complication of a bacterial infection of the tonsils (tonsillitis). The abscess causes one or both tonsils to swell. The infection and swelling may spread to nearby tissues. If tissues swell enough to block the throat, the condition can become life-threatening. It is also dangerous if the abscess bursts and the infection spreads or is breathed into the lungs. The goal is to treat a peritonsillar abscess before it worsens and threatens your health.  Signs and symptoms of peritonsillar abscess    Severe sore throat (often worse on one side)    Swollen and enlarged tonsils    Fever and chills    Pain when swallowing or trouble opening the mouth    Voice changes     Drooling    Swollen or tender glands in the neck  Diagnosing peritonsillar abscess  Your healthcare provider will examine you and look inside your mouth and throat. You will be asked about your symptoms and health history. Tests or procedures may be done as well, including those listed below.    Throat swab. This test checks for infection. It is done by wiping a sterile cotton swab in the back of the throat. The swab is then sent to a lab for study.    Blood tests. These might be done to check how your body is responding to the infection.    Ultrasound or computed tomography (CT) scans. These tests provide images of the abscess. They also help rule out other problems.    Needle aspiration. This procedure removes a sample of pus from the abscess with a needle. The sample is then sent to a lab to check for infection. In some cases, all of the pus is removed from the abscess.  Treating peritonsillar abscess  The abscess itself can be treated. Treatment of the underlying infection is also needed. Common treatments are listed below.    Medicines. Antibiotics are needed to treat the underlying infection. These may be taken by mouth  or given by IV. Pain relievers may also be given, if needed.    Drainage of the abscess. A procedure may be needed to drain the pus from the abscess. Pus may be removed from the abscess with a needle (needle aspiration). Or, a small incision is made in the abscess. The pus is then drained and suctioned from the throat and mouth. This is called incision and drainage.    Tonsillectomy. This is surgery to remove the tonsils. It may be done if the abscess does not improve with medicines. It may also be done if you have frequent tonsil infections or abscesses.  Recovery and follow-up  Treating the bacterial infection generally relieves the problem. Once the infection resolves, you should recover completely. Follow up with your healthcare provider as directed. And if you develop another throat infection, see your healthcare provider promptly.  Date Last Reviewed: 11/1/2016 2000-2019 [employer]. [address] [place] [address], [place] [address]. All rights reserved. This information is not intended as a substitute for professional medical care. Always follow your healthcare professional's instructions.

## 2020-02-27 NOTE — PROGRESS NOTES
"Subjective     Jacqui Grijalva is a 54 year old female who presents to clinic today for the following health issues:    HPI   Acute Illness   Acute illness concerns: sore throat   Onset:Tuesday    Fever: no     Chills/Sweats: YES    Headache (location?): YES    Sinus Pressure:no    Conjunctivitis:  YES: both    Ear Pain: no    Rhinorrhea: no     Congestion: no     Sore Throat: YES     Cough: no    Wheeze: no     Decreased Appetite: YES    Nausea: YES    Vomiting: YES    Diarrhea:  no     Dysuria/Freq.: no     Fatigue/Achiness: YES    Sick/Strep Exposure: no      Therapies Tried and outcome: OTC tylenol and throat lozenges  Sore throat is significant - not able to eat.  Is able to drink without coughing.  No trouble with breathing  Patient's mom  on     Reviewed and updated as needed this visit by Provider         Review of Systems   ROS COMP:   ROS:5 point ROS including CONST, HEENT, Respiratory, CV, and GI other than that noted in the HPI,  is negative         Objective    /80   Pulse 94   Temp 98.3  F (36.8  C) (Oral)   Resp 18   Ht 1.808 m (5' 11.18\")   Wt 122.7 kg (270 lb 9 oz)   LMP 2016 (Approximate)   SpO2 100%   BMI 37.54 kg/m    Body mass index is 37.54 kg/m .  Physical Exam   GENERAL: alert, mild distress and fatigued  EYES: Eyes grossly normal to inspection, PERRL and conjunctivae and sclerae normal  HENT: normal cephalic/atraumatic, ear canals and TM's normal, nose and mouth without ulcers or lesions, oral mucous membranes moist, R tonsillar hypertrophy, erythema and copious exudate and flattened right soft palate, displaced uvula  NECK: bilateral, tender, anterior cervical adenopathy R>L, no asymmetry, masses, or scars and thyroid normal to palpation  RESP: lungs clear to auscultation - no rales, rhonchi or wheezes  CV: regular rate and rhythm, normal S1 S2, no S3 or S4, no murmur, click or rub, no peripheral edema and peripheral pulses strong    Diagnostic Test Results:  Labs " reviewed in Epic  Results for orders placed or performed in visit on 02/27/20 (from the past 24 hour(s))   Streptococcus A Rapid Scr w Reflx to PCR   Result Value Ref Range    Strep Specimen Description Throat     Streptococcus Group A Rapid Screen Negative NEG^Negative   Influenza A/B antigen   Result Value Ref Range    Influenza A/B Agn Specimen Nasal     Influenza A Negative NEG^Negative    Influenza B Negative NEG^Negative   Mononucleosis screen   Result Value Ref Range    Mononucleosis Screen Negative NEG^Negative   CBC with platelets and differential   Result Value Ref Range    WBC 7.9 4.0 - 11.0 10e9/L    RBC Count 4.93 3.8 - 5.2 10e12/L    Hemoglobin 12.8 11.7 - 15.7 g/dL    Hematocrit 39.1 35.0 - 47.0 %    MCV 79 78 - 100 fl    MCH 26.0 (L) 26.5 - 33.0 pg    MCHC 32.7 31.5 - 36.5 g/dL    RDW 14.4 10.0 - 15.0 %    Platelet Count 211 150 - 450 10e9/L    Diff Method Automated Method     % Neutrophils 75.5 %    % Lymphocytes 10.5 %    % Monocytes 12.2 %    % Eosinophils 1.5 %    % Basophils 0.3 %    Absolute Neutrophil 6.0 1.6 - 8.3 10e9/L    Absolute Lymphocytes 0.8 0.8 - 5.3 10e9/L    Absolute Monocytes 1.0 0.0 - 1.3 10e9/L    Absolute Eosinophils 0.1 0.0 - 0.7 10e9/L    Absolute Basophils 0.0 0.0 - 0.2 10e9/L           Assessment & Plan     (J36) Tonsillar abscess  (primary encounter diagnosis)  Comment:   Plan: OTOLARYNGOLOGY REFERRAL, Mononucleosis screen,         CBC with platelets and differential,         clindamycin (CLEOCIN) 300 MG capsule,         HYDROcodone-acetaminophen (NORCO) 5-325 MG         tablet    Allergic to PCN so prescribed clindamycin.  Not having pain relief with ibuprofen.  Discussed safe use of vicodin for limited number of days (expect 1-2 days at most) with no refills.  Patient in agreement.  Urgent ENT appt made for tomorrow.    (F78.15) Flu-like symptoms  Comment:   Plan: Influenza A/B antigen            (J35.1) Tonsillar hypertrophy  Comment:   Plan: Mononucleosis screen, CBC  with platelets and         differential        Patient Instructions     Patient Education     Peritonsillar Abscess    A peritonsillar abscess is a collection of pus that forms near the tonsils. It is a complication of a bacterial infection of the tonsils (tonsillitis). The abscess causes one or both tonsils to swell. The infection and swelling may spread to nearby tissues. If tissues swell enough to block the throat, the condition can become life-threatening. It is also dangerous if the abscess bursts and the infection spreads or is breathed into the lungs. The goal is to treat a peritonsillar abscess before it worsens and threatens your health.  Signs and symptoms of peritonsillar abscess    Severe sore throat (often worse on one side)    Swollen and enlarged tonsils    Fever and chills    Pain when swallowing or trouble opening the mouth    Voice changes     Drooling    Swollen or tender glands in the neck  Diagnosing peritonsillar abscess  Your healthcare provider will examine you and look inside your mouth and throat. You will be asked about your symptoms and health history. Tests or procedures may be done as well, including those listed below.    Throat swab. This test checks for infection. It is done by wiping a sterile cotton swab in the back of the throat. The swab is then sent to a lab for study.    Blood tests. These might be done to check how your body is responding to the infection.    Ultrasound or computed tomography (CT) scans. These tests provide images of the abscess. They also help rule out other problems.    Needle aspiration. This procedure removes a sample of pus from the abscess with a needle. The sample is then sent to a lab to check for infection. In some cases, all of the pus is removed from the abscess.  Treating peritonsillar abscess  The abscess itself can be treated. Treatment of the underlying infection is also needed. Common treatments are listed below.    Medicines. Antibiotics are  needed to treat the underlying infection. These may be taken by mouth or given by IV. Pain relievers may also be given, if needed.    Drainage of the abscess. A procedure may be needed to drain the pus from the abscess. Pus may be removed from the abscess with a needle (needle aspiration). Or, a small incision is made in the abscess. The pus is then drained and suctioned from the throat and mouth. This is called incision and drainage.    Tonsillectomy. This is surgery to remove the tonsils. It may be done if the abscess does not improve with medicines. It may also be done if you have frequent tonsil infections or abscesses.  Recovery and follow-up  Treating the bacterial infection generally relieves the problem. Once the infection resolves, you should recover completely. Follow up with your healthcare provider as directed. And if you develop another throat infection, see your healthcare provider promptly.  Date Last Reviewed: 11/1/2016 2000-2019 [employer]. [address] [place] [address], [place] [address]. All rights reserved. This information is not intended as a substitute for professional medical care. Always follow your healthcare professional's instructions.               No follow-ups on file.    KAYLIE Doyle [facility]

## 2020-02-27 NOTE — TELEPHONE ENCOUNTER
FUTURE VISIT INFORMATION      FUTURE VISIT INFORMATION:    Date: 2/28/2020    Time: 3:30PM    Location: [facility] [place] [facility]  REFERRAL INFORMATION:     Referring provider:Kacie Guzman APRN CNP    Referring providers clinic:  [facility]     Reason for visit/diagnosis  tonsillar abscess    RECORDS REQUESTED FROM:       Clinic name Comments Records Status Imaging Status    [facility]  2/27/2020 notes with Kacie Guzman APRN CNP EPIC

## 2020-02-28 ENCOUNTER — PATIENT OUTREACH (OUTPATIENT)
Dept: OTOLARYNGOLOGY | Facility: CLINIC | Age: 55
End: 2020-02-28

## 2020-02-28 ENCOUNTER — PRE VISIT (OUTPATIENT)
Dept: OTOLARYNGOLOGY | Facility: CLINIC | Age: 55
End: 2020-02-28

## 2020-02-28 ENCOUNTER — HOSPITAL ENCOUNTER (EMERGENCY)
Facility: CLINIC | Age: 55
Discharge: HOME | End: 2020-02-28
Attending: EMERGENCY MEDICINE | Admitting: EMERGENCY MEDICINE
Payer: COMMERCIAL

## 2020-02-28 VITALS
TEMPERATURE: 98.1 F | HEART RATE: 75 BPM | OXYGEN SATURATION: 100 % | SYSTOLIC BLOOD PRESSURE: 148 MMHG | RESPIRATION RATE: 16 BRPM | BODY MASS INDEX: 37.74 KG/M2 | DIASTOLIC BLOOD PRESSURE: 87 MMHG | HEIGHT: 71 IN

## 2020-02-28 DIAGNOSIS — J03.90 TONSILLITIS: ICD-10-CM

## 2020-02-28 LAB
ALBUMIN SERPL-MCNC: 3.2 G/DL (ref 3.4–5)
ALP SERPL-CCNC: 66 U/L (ref 40–150)
ALT SERPL W P-5'-P-CCNC: 20 U/L (ref 0–50)
ANION GAP SERPL CALCULATED.3IONS-SCNC: 4 MMOL/L (ref 3–14)
AST SERPL W P-5'-P-CCNC: 15 U/L (ref 0–45)
BASOPHILS # BLD AUTO: 0 10E9/L (ref 0–0.2)
BASOPHILS NFR BLD AUTO: 0.2 %
BILIRUB SERPL-MCNC: 0.3 MG/DL (ref 0.2–1.3)
BUN SERPL-MCNC: 9 MG/DL (ref 7–30)
CALCIUM SERPL-MCNC: 8.3 MG/DL (ref 8.5–10.1)
CHLORIDE SERPL-SCNC: 104 MMOL/L (ref 94–109)
CO2 SERPL-SCNC: 28 MMOL/L (ref 20–32)
CREAT SERPL-MCNC: 0.87 MG/DL (ref 0.52–1.04)
DIFFERENTIAL METHOD BLD: ABNORMAL
EOSINOPHIL # BLD AUTO: 0.1 10E9/L (ref 0–0.7)
EOSINOPHIL NFR BLD AUTO: 1.2 %
ERYTHROCYTE [DISTWIDTH] IN BLOOD BY AUTOMATED COUNT: 13.3 % (ref 10–15)
GFR SERPL CREATININE-BSD FRML MDRD: 76 ML/MIN/{1.73_M2}
GLUCOSE SERPL-MCNC: 102 MG/DL (ref 70–99)
HCT VFR BLD AUTO: 40.7 % (ref 35–47)
HGB BLD-MCNC: 12.6 G/DL (ref 11.7–15.7)
IMM GRANULOCYTES # BLD: 0 10E9/L (ref 0–0.4)
IMM GRANULOCYTES NFR BLD: 0.2 %
LYMPHOCYTES # BLD AUTO: 0.6 10E9/L (ref 0.8–5.3)
LYMPHOCYTES NFR BLD AUTO: 10.1 %
MCH RBC QN AUTO: 25.1 PG (ref 26.5–33)
MCHC RBC AUTO-ENTMCNC: 31 G/DL (ref 31.5–36.5)
MCV RBC AUTO: 81 FL (ref 78–100)
MONOCYTES # BLD AUTO: 0.6 10E9/L (ref 0–1.3)
MONOCYTES NFR BLD AUTO: 11 %
NEUTROPHILS # BLD AUTO: 4.5 10E9/L (ref 1.6–8.3)
NEUTROPHILS NFR BLD AUTO: 77.3 %
NRBC # BLD AUTO: 0 10*3/UL
NRBC BLD AUTO-RTO: 0 /100
PLATELET # BLD AUTO: 229 10E9/L (ref 150–450)
POTASSIUM SERPL-SCNC: 4 MMOL/L (ref 3.4–5.3)
PROT SERPL-MCNC: 6.9 G/DL (ref 6.8–8.8)
RBC # BLD AUTO: 5.01 10E12/L (ref 3.8–5.2)
SODIUM SERPL-SCNC: 135 MMOL/L (ref 133–144)
WBC # BLD AUTO: 5.8 10E9/L (ref 4–11)

## 2020-02-28 PROCEDURE — 99284 EMERGENCY DEPT VISIT MOD MDM: CPT | Mod: Z6 | Performed by: EMERGENCY MEDICINE

## 2020-02-28 PROCEDURE — 25000128 H RX IP 250 OP 636: Performed by: EMERGENCY MEDICINE

## 2020-02-28 PROCEDURE — 80053 COMPREHEN METABOLIC PANEL: CPT | Performed by: EMERGENCY MEDICINE

## 2020-02-28 PROCEDURE — 25800030 ZZH RX IP 258 OP 636: Performed by: EMERGENCY MEDICINE

## 2020-02-28 PROCEDURE — 96361 HYDRATE IV INFUSION ADD-ON: CPT | Performed by: EMERGENCY MEDICINE

## 2020-02-28 PROCEDURE — 96374 THER/PROPH/DIAG INJ IV PUSH: CPT | Performed by: EMERGENCY MEDICINE

## 2020-02-28 PROCEDURE — 99284 EMERGENCY DEPT VISIT MOD MDM: CPT | Mod: 25 | Performed by: EMERGENCY MEDICINE

## 2020-02-28 PROCEDURE — 85025 COMPLETE CBC W/AUTO DIFF WBC: CPT | Performed by: EMERGENCY MEDICINE

## 2020-02-28 RX ORDER — SODIUM CHLORIDE 9 MG/ML
1000 INJECTION, SOLUTION INTRAVENOUS CONTINUOUS
Status: DISCONTINUED | OUTPATIENT
Start: 2020-02-28 | End: 2020-02-28 | Stop reason: HOSPADM

## 2020-02-28 RX ORDER — DEXAMETHASONE SODIUM PHOSPHATE 10 MG/ML
10 INJECTION, SOLUTION INTRAMUSCULAR; INTRAVENOUS ONCE
Status: COMPLETED | OUTPATIENT
Start: 2020-02-28 | End: 2020-02-28

## 2020-02-28 RX ADMIN — SODIUM CHLORIDE 1000 ML: 9 INJECTION, SOLUTION INTRAVENOUS at 14:49

## 2020-02-28 RX ADMIN — DEXAMETHASONE SODIUM PHOSPHATE 10 MG: 10 INJECTION, SOLUTION INTRAMUSCULAR; INTRAVENOUS at 15:20

## 2020-02-28 ASSESSMENT — ENCOUNTER SYMPTOMS
SORE THROAT: 1
TROUBLE SWALLOWING: 0
FACIAL SWELLING: 0
CONSTITUTIONAL NEGATIVE: 1
MYALGIAS: 1

## 2020-02-28 NOTE — ED AVS SNAPSHOT
[facility] [place], Emergency Department  [address] [place] [id number]  Phone:  [phone]                                    Jacqui Grijalva   MRN: [id number]    Department:  [facility], Emergency Department   Date of Visit:  2/28/2020           After Visit Summary Signature Page    I have received my discharge instructions, and my questions have been answered. I have discussed any challenges I see with this plan with the nurse or doctor.    ..........................................................................................................................................  Patient/Patient Representative Signature      ..........................................................................................................................................  Patient Representative Print Name and Relationship to Patient    ..................................................               ................................................  Date                                   Time    ..........................................................................................................................................  Reviewed by Signature/Title    ...................................................              ..............................................  Date                                               Time          22EPIC Rev 08/18

## 2020-02-28 NOTE — ED TRIAGE NOTES
Pt c/o sore throat. States she was seen by PCP yesterday and told she has a tonsil abscess. Started clindamycin yesterday. Pt supposed to see ENT today, but they wouldn't drain abscess in clinic. Airway intact.

## 2020-02-28 NOTE — PROGRESS NOTES
Patient returning RN call to discuss appointment scheduled with Dr. Barillas for today for a peritonsillar abscess. RN informed patient that we do not drain these in clinic and she should present to the emergency department to have this evaluated and drained. Patient is appreciative of the call and is in agreement with this plan. Patient denies any further questions or concerns at this time.     Nidia Peterson RN

## 2020-02-28 NOTE — PROGRESS NOTES
RN calling patient to instruct her to present to the ED for peritonsillar abscess discovered in primary care clinic yesterday. Patient was given an urgent referral to ENT to be seen for this, but peritonsillar abscess's are not drained by Dr. Barillas in-clinic. This patient needs to be seen in the ED to have this drained. RN left direct contact information requesting a call back to discuss.    Nidia Peterson RN

## 2020-02-28 NOTE — DISCHARGE INSTRUCTIONS
You do not have a peritonsillar abscess  Use your pain medication and finish the antibiotics as prescribed  In a small percentage of cases this will develop into a peritonsillar abscess

## 2020-02-28 NOTE — ED PROVIDER NOTES
[facility] EMERGENCY DEPARTMENT ([facility])  February 28, 2020    History     Chief Complaint   Patient presents with     Oral Swelling     The history is provided by the patient and medical records.     Jacqui Grijalva is a 54 year old female who presented to the Emergency Department today with a sore throat and body aches. She was seen on 2/27/2020 at her primary care clinic and was diagnosed with peritonsillar abscess and tested negative for influenza, mono, and strep. She was given clindamycin 300MG (allergic to PCN) and Hydrocodone-acetaminophen.  She had an ENT appointment today to drain the abscess, but they told her they don't do this in clinic and referred to the ED. Denies difficulty swallowing.    Past Medical History:   Diagnosis Date     Post-traumatic osteoarthritis of right knee 7/11/2016       History reviewed. No pertinent surgical history.    Family History   Problem Relation Age of Onset     Hypertension Mother      Eye Surgery Mother      Breast Cancer Mother      GERD Mother      Diabetes Father      Eye Surgery Father      Coronary Artery Disease Father      Heart Failure Father      Cancer Maternal Grandmother      Glaucoma No family hx of      Macular Degeneration No family hx of        Social History     Tobacco Use     Smoking status: Never Smoker     Smokeless tobacco: Never Used   Substance Use Topics     Alcohol use: Yes     Comment: 3/week       Current Facility-Administered Medications   Medication     0.9% sodium chloride BOLUS    Followed by     sodium chloride 0.9% infusion     Current Outpatient Medications   Medication     clindamycin (CLEOCIN) 300 MG capsule     HYDROcodone-acetaminophen (NORCO) 5-325 MG tablet     citalopram (CELEXA) 20 MG tablet     hydrOXYzine (ATARAX) 25 MG tablet     IBUPROFEN PO     Misc Natural Products (GLUCOSAMINE CHONDROITIN ADV PO)     multivitamin, therapeutic with minerals (MULTI-VITAMIN) TABS     valACYclovir (VALTREX) 1000 mg tablet       "  Allergies   Allergen Reactions     Penicillins        I have reviewed the Medications, Allergies, Past Medical and Surgical History, and Social History in the Epic system.    Review of Systems   Constitutional: Negative.    HENT: Positive for sore throat. Negative for facial swelling and trouble swallowing.    Musculoskeletal: Positive for myalgias (generalized).   All other systems reviewed and are negative.      Physical Exam   BP: 139/56  Pulse: 88  Heart Rate: 85  Temp: 98.1  F (36.7  C)  Resp: 16  Height: 180.3 cm (5' 11\")  SpO2: 96 %      Physical Exam  Vitals signs and nursing note reviewed.   HENT:      Mouth/Throat:      Pharynx: Uvula midline. Pharyngeal swelling, oropharyngeal exudate and posterior oropharyngeal erythema present.      Tonsils: Tonsillar exudate present. No tonsillar abscesses.        Comments: Enlarged right tonsil with exudate no trismus no peritonsillar abscess  Neurological:      Mental Status: She is alert.         ED Course        Procedures        Medications   0.9% sodium chloride BOLUS (1,000 mLs Intravenous New Bag 2/28/20 1449)     Followed by   sodium chloride 0.9% infusion (has no administration in time range)   dexamethasone PF (DECADRON) injection 10 mg (10 mg Intravenous Given 2/28/20 1520)            Labs Ordered and Resulted from Time of ED Arrival Up to the Time of Departure from the ED   CBC WITH PLATELETS DIFFERENTIAL - Abnormal; Notable for the following components:       Result Value    MCH 25.1 (*)     MCHC 31.0 (*)     Absolute Lymphocytes 0.6 (*)     All other components within normal limits   COMPREHENSIVE METABOLIC PANEL - Abnormal; Notable for the following components:    Glucose 102 (*)     Calcium 8.3 (*)     Albumin 3.2 (*)     All other components within normal limits            Assessments & Plan (with Medical Decision Making)   Patient referred to the emergency department for possible peritonsillar abscess.  Clinically she does not have it, she has no " trismus she has a enlarged right tonsil with exudate she is currently on antibiotics and pain medication.  She is given a dose of Decadron here to help with the swelling and she can have routine follow-up as outpatient no indication for incision and drainage or ENT evaluation    I have reviewed the nursing notes.    I have reviewed the findings, diagnosis, plan and need for follow up with the patient.    New Prescriptions    No medications on file       Final diagnoses:   Tonsillitis   I, Celso Bowling, am serving as a trained medical scribe to document services personally performed by Clayton Shelby MD, based on the provider's statements to me.     I, Clayton Shelby MD, was physically present and have reviewed and verified the accuracy of this note documented by Celso Bowling.      2/28/2020   [facility], [place], EMERGENCY DEPARTMENT     Clayton Shelby MD  02/28/20 1523

## 2020-03-20 DIAGNOSIS — F43.22 ADJUSTMENT DISORDER WITH ANXIOUS MOOD: ICD-10-CM

## 2020-03-20 RX ORDER — HYDROXYZINE HYDROCHLORIDE 50 MG/1
TABLET, FILM COATED ORAL
Qty: 60 TABLET | Refills: 1 | Status: SHIPPED | OUTPATIENT
Start: 2020-03-20 | End: 2022-06-30

## 2020-03-20 NOTE — TELEPHONE ENCOUNTER
"Requested Prescriptions   Pending Prescriptions Disp Refills     hydrOXYzine (ATARAX) 50 MG tablet [Pharmacy Med Name: HYDROXYZINE HCL 50MG TABS (WHITE)] 30 tablet      Sig: TAKE 1/2 TO 1 TABLETS(25 TO 50 MG) BY MOUTH EVERY 6 HOURS AS NEEDED FOR ANXIETY   Last Written Prescription Date:  12/3/18  Last Fill Quantity: 60,  # refills: 1   Last office visit: 2/27/2020 with prescribing provider:     Future Office Visit:        Antihistamines Protocol Passed - 3/20/2020  8:00 AM        Passed - Recent (12 mo) or future (30 days) visit within the authorizing provider's specialty     Patient has had an office visit with the authorizing provider or a provider within the authorizing providers department within the previous 12 mos or has a future within next 30 days. See \"Patient Info\" tab in inbasket, or \"Choose Columns\" in Meds & Orders section of the refill encounter.              Passed - Patient is age 3 or older     Apply age and/or weight-based dosing for peds patients age 3 and older.    Forward request to provider for patients under the age of 3.          Passed - Medication is active on med list         Prescription approved per [facility] Refill Protocol.    Amalia White RN   [facility]        "

## 2020-06-05 ENCOUNTER — VIRTUAL VISIT (OUTPATIENT)
Dept: FAMILY MEDICINE | Facility: OTHER | Age: 55
End: 2020-06-05

## 2020-06-05 NOTE — PROGRESS NOTES
"Date: 2020 10:54:59  Clinician: Ramírez Fong  Clinician NPI: [id number]  Patient: Jacqui Grijalva  Patient : 1965  Patient Address: [address] [place] [address], [place] [address]  Patient Phone: [phone]  Visit Protocol: URI  Patient Summary:  Jacqui is a 55 year old ( : 1965 ) female who initiated a Visit for COVID-19 (Coronavirus) evaluation and screening. When asked the question \"Please sign me up to receive news, health information and promotions. \", Jacqui responded \"No\".    Jacqui does not have any symptoms. Jacqui denies having wheezing, nausea, teeth pain, ageusia, diarrhea, sore throat, enlarged lymph nodes, malaise, myalgias, anosmia, facial pain or pressure, fever, cough, nasal congestion, vomiting, rhinitis, ear pain, headache, and chills. She also denies having recent facial or sinus surgery in the past 60 days and taking antibiotic medication for the symptoms. She is not experiencing dyspnea.    Pertinent COVID-19 (Coronavirus) information  In the past 14 days, Jacqui has not worked in a congregate living setting.   She either works or volunteers as a healthcare worker or a , or works or volunteers in a healthcare facility. 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 She does not live with a healthcare worker.   Jacqui has not had a close contact with a laboratory-confirmed COVID-19 patient within 14 days of symptom onset.   Pertinent medical history  Jacqui does not get yeast infections when she takes antibiotics.   Jacqui does not need a return to work/school note.   Weight: 275 lbs   Jacqui does not smoke or use smokeless tobacco.   Additional information as reported by the patient (free text): I participated in a large social " joyce on [place], May 31   Weight: 275 lbs    MEDICATIONS: No current medications, ALLERGIES: Penicillins  Clinician Response:  Dear Jacqui,    Based on the details you've shared, you may have been exposed to coronavirus (COVID-19). You do not need to be tested at this time.  What should I do?  For safety, it's very important to follow these rules. Do this for 14 days after the date you were last exposed to the virus..  Stay home and away from others. Don't go to work, school or anywhere else.  No hugging, kissing or shaking hands.  Don't let anyone visit.  Cover your mouth and nose with a mask, tissue or washcloth to avoid spreading germs.  Wash your hands and face often. Use soap and water.  What are the symptoms of COVID-19?  The most common symptoms are cough, fever and trouble breathing. Less common symptoms include headache, body aches, fatigue (feeling very tired), chills, sore throat, stuffy or runny nose, diarrhea (loose poop), loss of taste or smell, belly pain, and nausea or vomiting (feeling sick to your stomach or throwing up).  After 14 days, if you have still don't have symptoms, you likely don't have this virus.  If you develop symptoms, follow these guidelines.  If you're normally healthy: Please start another [facility] visit to report your symptoms. Go to [email or web address].  If you have a serious health problem (like cancer, heart failure, an organ transplant or kidney disease): Call your specialty clinic. Let them know that you might have COVID-19.  Where can I get more information?    [employer] [facility] -- About COVID-19: [email or web address]  CDC -- What to Do If You're Sick: www.cdc.gov/coronavirus/2019-ncov/about/steps-when-sick.html  CDC -- Ending Home Isolation: www.cdc.gov/coronavirus/2019-ncov/hcp/disposition-in-home-patients.html  CDC -- Caring for Someone: www.cdc.gov/coronavirus/2019-ncov/if-you-are-sick/care-for-someone.html  [facility] -- Interim Guidance for Hospital Discharge to Home:  www.health.[facility].[place]/diseases/coronavirus/hcp/hospdischarge.pdf  [facility] clinical trials (COVID-19 research studies): clinicalaffairs.[facility]-clinical-trials  Below are the COVID-19 hotlines at the [place] Department of Health ([facility]). Interpreters are available.   For health questions: Call 596-151-3414 or 1-993.210.9439 (7 a.m. to 7 p.m.) For questions about schools and childcare: Call 433-711-2967 or 1-877.864.3949 (7 a.m. to 7 p.m.)     .        COVID-19 (Coronavirus) General Information  Because there is currently no vaccine to prevent infection, the best way to protect yourself is to avoid being exposed to this virus. Common symptoms of COVID-19 include but are not limited to fever, cough, and shortness of breath. These symptoms appear 2-14 days after you are exposed to the virus that causes COVID-19. Click here for more information from the CDC on how to protect yourself.  If you are sick with COVID-19 or suspect you are infected with the virus that causes COVID-19, follow the steps here from the CDC to help prevent the disease from spreading to people in your home and community.  Click here for general information from the CDC on testing.  If you develop any of these emergency warning signs for COVID-19, get medical attention immediately:     Trouble breathing    Persistent pain or pressure in the chest    New confusion or inability to arouse    Bluish lips or face      Call your doctor or clinic before going in. Call [phone] if you have a medical emergency and notify the  you have or think you may have COVID-19.  For more detailed and up to date information on COVID-19 (Coronavirus), please visit the CDC website.   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

## 2020-09-23 DIAGNOSIS — B00.1 COLD SORE: ICD-10-CM

## 2020-09-23 RX ORDER — VALACYCLOVIR HYDROCHLORIDE 1 G/1
TABLET, FILM COATED ORAL
Qty: 4 TABLET | Refills: 0 | Status: SHIPPED | OUTPATIENT
Start: 2020-09-23 | End: 2022-06-30

## 2020-09-23 NOTE — TELEPHONE ENCOUNTER
"Requested Prescriptions   Pending Prescriptions Disp Refills     valACYclovir (VALTREX) 1000 mg tablet 4 tablet 0     Sig: TAKE 2 TABLETS(2000 MG) BY MOUTH TWICE DAILY       Antivirals for Herpes Protocol Passed - 9/23/2020 12:08 PM        Passed - Patient is age 12 or older        Passed - Recent (12 mo) or future (30 days) visit within the authorizing provider's specialty     Patient has had an office visit with the authorizing provider or a provider within the authorizing providers department within the previous 12 mos or has a future within next 30 days. See \"Patient Info\" tab in inbasket, or \"Choose Columns\" in Meds & Orders section of the refill encounter.              Passed - Medication is active on med list        Passed - Normal serum creatinine on file in past 12 months     Recent Labs   Lab Test 02/28/20  1451   CR 0.87       Ok to refill medication if creatinine is low           Prescription approved per [facility] Refill Protocol.    Bobbi Lui RN   [facility]   "

## 2021-01-15 ENCOUNTER — HEALTH MAINTENANCE LETTER (OUTPATIENT)
Age: 56
End: 2021-01-15

## 2021-03-18 ENCOUNTER — ANCILLARY PROCEDURE (OUTPATIENT)
Dept: GENERAL RADIOLOGY | Facility: CLINIC | Age: 56
End: 2021-03-18
Attending: FAMILY MEDICINE
Payer: COMMERCIAL

## 2021-03-18 ENCOUNTER — OFFICE VISIT (OUTPATIENT)
Dept: ORTHOPEDICS | Facility: CLINIC | Age: 56
End: 2021-03-18
Payer: COMMERCIAL

## 2021-03-18 DIAGNOSIS — M79.641 RIGHT HAND PAIN: Primary | ICD-10-CM

## 2021-03-18 DIAGNOSIS — M79.641 RIGHT HAND PAIN: ICD-10-CM

## 2021-03-18 PROCEDURE — 73130 X-RAY EXAM OF HAND: CPT | Mod: RT | Performed by: RADIOLOGY

## 2021-03-18 PROCEDURE — 99203 OFFICE O/P NEW LOW 30 MIN: CPT | Performed by: FAMILY MEDICINE

## 2021-03-18 NOTE — PROGRESS NOTES
[facility] CLINICS AND SURGERY CENTER  SPORTS & ORTHOPEDIC CLINIC VISIT     Mar 18, 2021        ASSESSMENT & PLAN    56-year-old with right hand pain after fall.  No fracture evident on x-ray, though some concern for occult fourth and fifth metacarpal fracture does exist    Reviewed imaging and assessment with patient in detail  I recommended period of splinting/bracing for the next week.  If she is still having significant point tenderness at that time we will consider reimaging versus further advanced imaging for identification of occult fracture.  If she is feeling better at that time she can progress out of the splint as tolerated.  Discussed icing and OTC pain medications as needed    Hal Betancourt MD  [facility] ORTHOPEDIC [facility]    -----  Chief Complaint   Patient presents with     Right Hand - Pain       SUBJECTIVE  Jacqui Grijalva is a/an 56 year old female who is seen as a self referral for evaluation of  Right hand pain.     The patient is seen by themselves.  The patient is Right handed    Onset: 3/12/21. Patient describes injury as tripping over uneven pavement and landed with a FOOSH mechanism on her lateral hand. Hand is swollen and painful to use   Location of Pain: Fourth and fifth metacarpals proximal  Worsened by: Use of that hand, grabbing, rotating.   Better with: Resting   Treatments tried: no treatment tried to date  Associated symptoms: swelling    Orthopedic/Surgical history: NO  Social History/Occupation: RN non patient care       REVIEW OF SYSTEMS:    Do you have fever, chills, weight loss? No    Do you have any vision problems? No    Do you have any chest pain or edema? No    Do you have any shortness of breath or wheezing?  No    Do you have stomach problems? No    Do you have any numbness or focal weakness? No    Do you have diabetes? No    Do you have problems with bleeding or clotting? No    Do you have an rashes or other skin lesions? No    OBJECTIVE:  LMP  08/01/2016 (Approximate)      General: Alert, pleasant, no distress  Right hand: warm, well perfused, SILT throughout     Inspection: soft tissue swelling over dorsal ulnar hand. No deformity     ROM: Symmetric though with pain on terminal flexion and extension of the wrist     Strength: Intact     Palpation:ttp over base of 4th and 5th mc.  No tenderness over the remaining metacarpals, carpal bones distal right     Special Tests: None      RADIOLOGY:    3 view xrays of right hand performed and reviewed independently demonstrating no acute fracture dislocation.  Mild DJD of the CMC and triscaphe joints. See EMR for formal radiology report.

## 2021-03-18 NOTE — LETTER
3/18/2021         RE: Jacqui Grijalva  [address]  [address]  [place] [address]        Dear Colleague,    Thank you for referring your patient, Jacqui Grijalva, to the [facility] ORTHOPEDIC [facility]. Please see a copy of my visit note below.      [facility] CLINICS AND SURGERY CENTER  SPORTS & ORTHOPEDIC CLINIC VISIT     Mar 18, 2021        ASSESSMENT & PLAN    56-year-old with right hand pain after fall.  No fracture evident on x-ray, though some concern for occult fourth and fifth metacarpal fracture does exist    Reviewed imaging and assessment with patient in detail  I recommended period of splinting/bracing for the next week.  If she is still having significant point tenderness at that time we will consider reimaging versus further advanced imaging for identification of occult fracture.  If she is feeling better at that time she can progress out of the splint as tolerated.  Discussed icing and OTC pain medications as needed    Hal Betancourt MD  [facility] ORTHOPEDIC [facility]    -----  Chief Complaint   Patient presents with     Right Hand - Pain       SUBJECTIVE  Jacqui Grijalva is a/an 56 year old female who is seen as a self referral for evaluation of  Right hand pain.     The patient is seen by themselves.  The patient is Right handed    Onset: 3/12/21. Patient describes injury as tripping over uneven pavement and landed with a FOOSH mechanism on her lateral hand. Hand is swollen and painful to use   Location of Pain: Fourth and fifth metacarpals proximal  Worsened by: Use of that hand, grabbing, rotating.   Better with: Resting   Treatments tried: no treatment tried to date  Associated symptoms: swelling    Orthopedic/Surgical history: NO  Social History/Occupation: RN non patient care       REVIEW OF SYSTEMS:    Do you have fever, chills, weight loss? No    Do you have any vision problems? No    Do you have any chest pain or edema? No    Do  you have any shortness of breath or wheezing?  No    Do you have stomach problems? No    Do you have any numbness or focal weakness? No    Do you have diabetes? No    Do you have problems with bleeding or clotting? No    Do you have an rashes or other skin lesions? No    OBJECTIVE:  [facility] 08/01/2016 (Approximate)      General: Alert, pleasant, no distress  Right hand: warm, well perfused, SILT throughout     Inspection: soft tissue swelling over dorsal ulnar hand. No deformity     ROM: Symmetric though with pain on terminal flexion and extension of the wrist     Strength: Intact     Palpation:ttp over base of 4th and 5th mc.  No tenderness over the remaining metacarpals, carpal bones distal right     Special Tests: None      RADIOLOGY:    3 view xrays of right hand performed and reviewed independently demonstrating no acute fracture dislocation.  Mild DJD of the CMC and triscaphe joints. See EMR for formal radiology report.

## 2021-10-24 ENCOUNTER — HEALTH MAINTENANCE LETTER (OUTPATIENT)
Age: 56
End: 2021-10-24

## 2022-02-13 ENCOUNTER — HEALTH MAINTENANCE LETTER (OUTPATIENT)
Age: 57
End: 2022-02-13

## 2022-06-30 ENCOUNTER — VIRTUAL VISIT (OUTPATIENT)
Dept: FAMILY MEDICINE | Facility: CLINIC | Age: 57
End: 2022-06-30
Payer: COMMERCIAL

## 2022-06-30 DIAGNOSIS — Z12.31 VISIT FOR SCREENING MAMMOGRAM: ICD-10-CM

## 2022-06-30 DIAGNOSIS — U07.1 INFECTION DUE TO 2019 NOVEL CORONAVIRUS: Primary | ICD-10-CM

## 2022-06-30 PROCEDURE — 99213 OFFICE O/P EST LOW 20 MIN: CPT | Mod: CS | Performed by: NURSE PRACTITIONER

## 2022-06-30 NOTE — PATIENT INSTRUCTIONS

## 2022-06-30 NOTE — PROGRESS NOTES
Jacqui is a 57 year old who is being evaluated via a billable video visit.      How would you like to obtain your AVS? MyChart  If the video visit is dropped, the invitation should be resent by: Text to cell phone: .  Will anyone else be joining your video visit? No        Assessment & Plan     Infection due to 2019 novel coronavirus  Treating with Paxlovid given history of obesity, current weight 275#. Home care reveiwed ind etail, follow back new/worsening symptoms.  - REVIEW OF HEALTH MAINTENANCE PROTOCOL ORDERS  - nirmatrelvir and ritonavir (PAXLOVID) therapy pack  Dispense: 30 each; Refill: 0    Visit for screening mammogram    - MA SCREENING DIGITAL BILAT - [facility]  (s+30)        19 minutes spent on the date of the encounter doing chart review, history and exam, documentation and further activities per the note       Work on weight loss  Regular exercise  See Patient Instructions    No follow-ups on file.    KAYLIE [facility]    Subjective   Jacqui is a 57 year old  presenting for the following health issues:  Covid Concern      HPI       COVID-19 Symptom Review  How many days ago did these symptoms start? 6/26/22    Are any of the following symptoms significant for you?    New or worsening difficulty breathing? No    Worsening cough? Yes, I am coughing up mucus.    Fever or chills? Yes, I felt feverish or had chills.    Headache: YES    Sore throat: YES    Chest pain: no    Diarrhea: no    Body aches? no    What treatments has patient tried? Acetaminophen, Decongestant - oral and Cough syrup   Does patient live in a nursing home, group home, or shelter? no  Does patient have a way to get food/medications during quarantined? Yes, I have a friend or family member who can help me.      Review of Systems   Constitutional, HEENT, cardiovascular, pulmonary, gi and gu systems are negative, except as otherwise noted.      Objective           Vitals:  No vitals were  obtained today due to virtual visit.    Physical Exam   GENERAL: Healthy, alert and no distress  EYES: Eyes grossly normal to inspection.  No discharge or erythema, or obvious scleral/conjunctival abnormalities.  HENT: Normal cephalic/atraumatic.  External ears, nose and mouth without ulcers or lesions.  No nasal drainage visible.  NECK: No asymmetry, visible masses or scars  RESP: Loose cough, able to speak in full sentences, no audible wheeze, or visible cyanosis.  No visible retractions or increased work of breathing.   SKIN: Visible skin clear. No significant rash, abnormal pigmentation or lesions.  NEURO: Cranial nerves grossly intact.  Mentation and speech appropriate for age.  PSYCH: Mentation appears normal, affect normal/bright, judgement and insight intact, normal speech and appearance well-groomed.      Video-Visit Details    Video Start Time: 1:45    Type of service:  Video Visit    Video End Time:2:00 PM    Originating Location (pt. Location): Home    Distant Location (provider location):  [facility]     Platform used for Video Visit: Tori Walker

## 2022-09-07 ENCOUNTER — LAB REQUISITION (OUTPATIENT)
Dept: LAB | Facility: CLINIC | Age: 57
End: 2022-09-07

## 2022-09-07 LAB
HBV SURFACE AB SERPL IA-ACNC: 61.75 M[IU]/ML
HBV SURFACE AB SERPL IA-ACNC: REACTIVE M[IU]/ML

## 2022-09-07 PROCEDURE — 86706 HEP B SURFACE ANTIBODY: CPT | Performed by: INTERNAL MEDICINE

## 2022-09-07 PROCEDURE — 86481 TB AG RESPONSE T-CELL SUSP: CPT | Performed by: INTERNAL MEDICINE

## 2022-09-08 LAB
GAMMA INTERFERON BACKGROUND BLD IA-ACNC: 0.05 IU/ML
M TB IFN-G BLD-IMP: NEGATIVE
M TB IFN-G CD4+ BCKGRND COR BLD-ACNC: 9.95 IU/ML
MITOGEN IGNF BCKGRD COR BLD-ACNC: -0.01 IU/ML
MITOGEN IGNF BCKGRD COR BLD-ACNC: 0 IU/ML
QUANTIFERON MITOGEN: 10 IU/ML
QUANTIFERON NIL TUBE: 0.05 IU/ML
QUANTIFERON TB1 TUBE: 0.05 IU/ML
QUANTIFERON TB2 TUBE: 0.04

## 2022-10-15 ENCOUNTER — HEALTH MAINTENANCE LETTER (OUTPATIENT)
Age: 57
End: 2022-10-15

## 2022-11-02 PROCEDURE — U0003 INFECTIOUS AGENT DETECTION BY NUCLEIC ACID (DNA OR RNA); SEVERE ACUTE RESPIRATORY SYNDROME CORONAVIRUS 2 (SARS-COV-2) (CORONAVIRUS DISEASE [COVID-19]), AMPLIFIED PROBE TECHNIQUE, MAKING USE OF HIGH THROUGHPUT TECHNOLOGIES AS DESCRIBED BY CMS-2020-01-R: HCPCS | Performed by: INTERNAL MEDICINE

## 2022-11-03 ENCOUNTER — LAB REQUISITION (OUTPATIENT)
Dept: LAB | Facility: CLINIC | Age: 57
End: 2022-11-03

## 2022-11-03 LAB — SARS-COV-2 RNA RESP QL NAA+PROBE: NEGATIVE

## 2022-12-05 ENCOUNTER — TELEPHONE (OUTPATIENT)
Dept: FAMILY MEDICINE | Facility: CLINIC | Age: 57
End: 2022-12-05

## 2022-12-05 NOTE — TELEPHONE ENCOUNTER
Patient Quality Outreach    Patient is due for the following:   Breast Cancer Screening - Mammogram    Next Steps:   Schedule a office visit for mammogram    Type of outreach:    Phone, left message for patient/parent to call back.    Next Steps:  Reach out within 90 days via [employer].    Max number of attempts reached: Yes. Will try again in 90 days if patient still on fail list.    Questions for provider review:    None     Jd Staley

## 2023-03-07 ENCOUNTER — TELEPHONE (OUTPATIENT)
Dept: FAMILY MEDICINE | Facility: CLINIC | Age: 58
End: 2023-03-07
Payer: COMMERCIAL

## 2023-03-07 NOTE — TELEPHONE ENCOUNTER
Patient Quality Outreach    Patient is due for the following:   Breast Cancer Screening - Mammogram    Next Steps:   Schedule a office visit for mammogram    Type of outreach:    Phone, left message for patient/parent to call back.    Next Steps:  Reach out within 90 days via Phone.    Max number of attempts reached: Yes. Will try again in 90 days if patient still on fail list.    Questions for provider review:    None     Jd Staley

## 2023-03-26 ENCOUNTER — HEALTH MAINTENANCE LETTER (OUTPATIENT)
Age: 58
End: 2023-03-26

## 2023-08-15 ENCOUNTER — ANCILLARY PROCEDURE (OUTPATIENT)
Dept: MAMMOGRAPHY | Facility: CLINIC | Age: 58
End: 2023-08-15
Attending: NURSE PRACTITIONER
Payer: COMMERCIAL

## 2023-08-15 DIAGNOSIS — Z12.31 VISIT FOR SCREENING MAMMOGRAM: ICD-10-CM

## 2023-08-15 PROCEDURE — 77067 SCR MAMMO BI INCL CAD: CPT | Mod: 26

## 2023-08-15 PROCEDURE — 77067 SCR MAMMO BI INCL CAD: CPT

## 2023-08-21 ENCOUNTER — TELEPHONE (OUTPATIENT)
Dept: ORTHOPEDICS | Facility: CLINIC | Age: 58
End: 2023-08-21
Payer: COMMERCIAL

## 2023-08-23 ENCOUNTER — TELEPHONE (OUTPATIENT)
Dept: ORTHOPEDICS | Facility: CLINIC | Age: 58
End: 2023-08-23
Payer: COMMERCIAL

## 2023-09-11 NOTE — TELEPHONE ENCOUNTER
DIAGNOSIS: (L) Ankle Pain / Self Ref / UMR / Images 5 yrs ago    APPOINTMENT DATE: 09/20/23   NOTES STATUS DETAILS   OFFICE NOTE from referring provider N/A Self   OFFICE NOTE from other specialist Internal 08/13/18 - Rogelio Grimaldo MD    MEDICATION LIST Internal    XRAYS  & INJECTIONS (IMAGES & REPORTS) Internal XR L ANKLE:  - 08/13/18

## 2023-09-20 ENCOUNTER — PRE VISIT (OUTPATIENT)
Dept: ORTHOPEDICS | Facility: CLINIC | Age: 58
End: 2023-09-20

## 2023-09-20 ENCOUNTER — OFFICE VISIT (OUTPATIENT)
Dept: ORTHOPEDICS | Facility: CLINIC | Age: 58
End: 2023-09-20
Payer: COMMERCIAL

## 2023-09-20 ENCOUNTER — ANCILLARY PROCEDURE (OUTPATIENT)
Dept: GENERAL RADIOLOGY | Facility: CLINIC | Age: 58
End: 2023-09-20
Attending: FAMILY MEDICINE
Payer: COMMERCIAL

## 2023-09-20 VITALS — HEIGHT: 72 IN | WEIGHT: 275 LBS | BODY MASS INDEX: 37.25 KG/M2

## 2023-09-20 DIAGNOSIS — M19.072 PRIMARY OSTEOARTHRITIS OF LEFT ANKLE: Primary | ICD-10-CM

## 2023-09-20 DIAGNOSIS — M25.572 LEFT ANKLE PAIN: ICD-10-CM

## 2023-09-20 DIAGNOSIS — M25.572 LEFT ANKLE PAIN: Primary | ICD-10-CM

## 2023-09-20 PROCEDURE — 99214 OFFICE O/P EST MOD 30 MIN: CPT | Performed by: FAMILY MEDICINE

## 2023-09-20 PROCEDURE — 73610 X-RAY EXAM OF ANKLE: CPT | Mod: LT | Performed by: RADIOLOGY

## 2023-09-20 NOTE — LETTER
9/20/2023      RE: Jacqui Grijalva  [address]  [facility] [address]     Dear Colleague,    Thank you for referring your patient, Jacqui Grijalva, to the [facility] [place]. Please see a copy of my visit note below.    ASSESSMENT/PLAN:  Jacqui Grijalva is a 57 y/o F w/ worsening chronic L ankle pain  L ankle pain  - XR L ankle today revealed severe osteoarthritis   - Referral to pool therapy  - counseled patient on weight management in OA treatment  - referral to orthopedic, Dr. Rincon  - will schedule CSI in 1 mo, pt will consider and call back if she wants to have injection      Pt is a 58 year old female here today for evaluation of chronic and worsening L ankle pain. 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 States she has limited mobility in her ankle and has prevented her from going on vacation and enjoying golfing. Was last seen 5 years ago for injection that did not provide any relief. Interested in exploring surgical options, fusion > replacement. 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      HPI:   left ankle:  Location: Left ankle    Duration:Chronic   Trauma/ Fall? Fracture in childhood   Able to walk? Yes  Swelling? Yes   Bruising? None  Numbness/ Tingling? None   Weakness? None   Instability? None  Snapping/Clicking? None  Imaging? XR 9/20/23; XR 8/13/2018  Treatment? PT, CSI, NSAIDs    EXAM:   B/L LE edema present  Diffuse pain on palpation of L ankle, medial > lateral aspect of joint  5/5 R ankle dorsi flexion and plantar flexion, 3/5 L ankle dorsi flexion and extension.  5/5 symmetric strength on B/L dorsi flexion and extension, inversion, and eversion    Past Medical History:   Diagnosis Date    Post-traumatic osteoarthritis of right knee 7/11/2016      No past surgical history on file.   Current Outpatient Medications   Medication Sig Dispense Refill    IBUPROFEN PO        multivitamin w/minerals (THERA-VIT-M) tablet Take 1 tablet by mouth daily        Allergies   Allergen Reactions    Pcn [Penicillins]       ROS:   Gen- no fevers/chills   Rheum - no morning stiffness   Derm - no rash/ redness   Neuro - no numbness, no tingling   Remainder of ROS negative.     Exam:   LMP 08/01/2016 (Approximate)      Xray of LT ankle on September 20, 2023 at [facility] location - films personally reviewed with patient at time of visit     My impression: Severe degenerative changes of L ankle      Amara Mosqueda, QUINTIN  [employer] [facility] [employer]       Patient seen, evaluated and discussed with the student. I have verified the content of the note, which accurately reflects my assessment of the patient and the plan of care.   Supervising Physician:  Brandie Nielsen MD       Again, thank you for allowing me to participate in the care of your patient.      Sincerely,    Brandie Nielsen MD

## 2023-09-20 NOTE — PROGRESS NOTES
ASSESSMENT/PLAN:  Jacqui Grijalva is a 57 y/o F w/ worsening chronic L ankle pain  L ankle pain  - XR L ankle today revealed severe osteoarthritis   - Referral to pool therapy  - counseled patient on weight management in OA treatment  - referral to orthopedic, Dr. Rincon  - will schedule CSI in 1 mo, pt will consider and call back if she wants to have injection      Pt is a 58 year old female here today for evaluation of chronic and worsening L ankle pain. 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 States she has limited mobility in her ankle and has prevented her from going on vacation and enjoying golfing. Was last seen 5 years ago for injection that did not provide any relief. Interested in exploring surgical options, fusion > replacement. 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      HPI:   left ankle:  Location: Left ankle    Duration:Chronic   Trauma/ Fall? Fracture in childhood   Able to walk? Yes  Swelling? Yes   Bruising? None  Numbness/ Tingling? None   Weakness? None   Instability? None  Snapping/Clicking? None  Imaging? XR 9/20/23; XR 8/13/2018  Treatment? PT, CSI, NSAIDs    EXAM:   B/L LE edema present  Diffuse pain on palpation of L ankle, medial > lateral aspect of joint  5/5 R ankle dorsi flexion and plantar flexion, 3/5 L ankle dorsi flexion and extension.  5/5 symmetric strength on B/L dorsi flexion and extension, inversion, and eversion    Past Medical History:   Diagnosis Date    Post-traumatic osteoarthritis of right knee 7/11/2016      No past surgical history on file.   Current Outpatient Medications   Medication Sig Dispense Refill    IBUPROFEN PO       multivitamin w/minerals (THERA-VIT-M) tablet Take 1 tablet by mouth daily        Allergies   Allergen Reactions    Pcn [Penicillins]       ROS:   Gen- no fevers/chills   Rheum - no morning stiffness   Derm - no rash/ redness   Neuro - no numbness, no tingling   Remainder  of ROS negative.     Exam:   LMP 08/01/2016 (Approximate)      Xray of LT ankle on September 20, 2023 at [facility] location - films personally reviewed with patient at time of visit     My impression: Severe degenerative changes of L ankle      Amara Mosqueda, QUINTIN  [employer]       Patient seen, evaluated and discussed with the student. I have verified the content of the note, which accurately reflects my assessment of the patient and the plan of care.   Supervising Physician:  Brandie Nielsen MD

## 2023-09-22 NOTE — TELEPHONE ENCOUNTER
DIAGNOSIS: Left Ankle   APPOINTMENT DATE: 09/26/2023   NOTES STATUS DETAILS   OFFICE NOTE from referring provider Internal 09/20/2023 - Vikki Nielsen MD - [facility]   OFFICE NOTE from other specialist Internal 08/30/2018 - Rogelio Grimaldo MD - [facility]   XRAYS (IMAGES & REPORTS) PACS Internal:  09/20/2023, 08/13/2018 - LT ANkle

## 2023-09-26 ENCOUNTER — PRE VISIT (OUTPATIENT)
Dept: ORTHOPEDICS | Facility: CLINIC | Age: 58
End: 2023-09-26

## 2023-09-26 ENCOUNTER — OFFICE VISIT (OUTPATIENT)
Dept: ORTHOPEDICS | Facility: CLINIC | Age: 58
End: 2023-09-26
Payer: COMMERCIAL

## 2023-09-26 VITALS — WEIGHT: 275 LBS | HEIGHT: 72 IN | BODY MASS INDEX: 37.25 KG/M2

## 2023-09-26 DIAGNOSIS — M19.072 PRIMARY OSTEOARTHRITIS OF LEFT ANKLE: ICD-10-CM

## 2023-09-26 PROCEDURE — 99204 OFFICE O/P NEW MOD 45 MIN: CPT | Performed by: ORTHOPAEDIC SURGERY

## 2023-09-26 NOTE — PROGRESS NOTES
Pre-Op Teaching was done in person at the clinic.    Teaching Flowsheet   Relevant Diagnosis: Pre-Op Teaching  Teaching Topic:      Person(s) involved in teaching:   Patient     Motivation Level:  Asks Questions: Yes  Eager to Learn: Yes  Cooperative: Yes  Receptive (willing/able to accept information): Yes  Any cultural factors/Yazidism beliefs that may influence understanding or compliance? No     Patient demonstrates understanding of the following:  Reason for the appointment, diagnosis and treatment plan: Yes  Knowledge of proper use of medications and conditions for which they are ordered (with special attention to potential side effects or drug interactions): Yes  Which situations necessitate calling provider and whom to contact: Yes- discussed the stoplight tool to help assist with this.      Teaching Concerns Addressed:      Proper use of surgical scrub explain: Yes    Nutritional needs and diet plan: Yes  Pain management techniques: Yes  Wound Care: Yes  How and/when to access community resources: Yes     Instructional Materials Used/Given:  2 bottle of chlorhexidine and a surgery packet given to patient in clinic.      - Important contact info/ phone numbers: emphasizing clinic number and after hours number  - Map/ location of surgery  - Medications to avoid  - Showering instructions  - Stop light tool     -Next step: Schedule a surgery date and schedule a Pre-Op appointment with PCP     Time spent with patient: 15 minutes.

## 2023-09-26 NOTE — LETTER
9/26/2023         RE: Jacqui Grijalva  [address]  [facility] [facility] [address]        Dear Colleague,    Thank you for referring your patient, Jacqui Grijalva, to the [facility] [place]. Please see a copy of my visit note below.    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  Patient will remain nonweightbearing for a full year and since then she reports to have a progressive deterioration of the ankle with pain and swelling.    Patient reports to have had an injection approximately 5 years ago which was not effective improving her symptoms however the injection apparently was done freehand in the office.    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  Reports to have a fair amount of swelling with any degree of activities let alone the pain and discomfort that she is experiencing.    We reviewed today her past medical and surgical history current medications and drug allergies    On today's visit she presents a very pleasant female in nonapparent distress with weight of 275 pounds and a BMI of 37.3    On today's exam she presents with a 40 most part no motion across the ankle joint with no more than 5 to 7 degrees of plantar and dorsiflexion alignment is excellent there is palpable pulses there is some diffuse swelling of the lower legs.  Forefoot exam is unremarkable.    Plan x-rays in my previous visit were reviewed today which are significant for showing a fair amount of posterior arthritis across the left ankle with a large amount of osteophyte formation anteriorly.  Alignment is excellent.  The subtalar and talonavicular  joints present a very healthy appearance.    Assessment: Left ankle arthritis    Plan: I discussed with the patient that at this point she is not creating any damage by having pain or discomfort.  We discussed the possibility of undergoing an ankle replacement versus ankle fusion.  My advice was to proceed with an ankle fusion something she is interested on.    We discussed the most likely postoperative course and complications from undergoing a left ankle arthrodesis.  Complications included but not limited to infection bleeding nerve damage residual pain nonunion and stiffness    Patient is planning to have the surgery sometime in mid December 2023 and in the meantime organ to proceed with laparoscopic guided injection into the left ankle with lidocaine and Kenalog for a diagnosis of osteoarthritis.    All questions were answered.  Patient was pleased with discussion.  She has no activity restrictions.    TT 30 minutes    Pre-Op Teaching was done in person at the clinic.    Teaching Flowsheet   Relevant Diagnosis: Pre-Op Teaching  Teaching Topic:      Person(s) involved in teaching:   Patient     Motivation Level:  Asks Questions: Yes  Eager to Learn: Yes  Cooperative: Yes  Receptive (willing/able to accept information): Yes  Any cultural factors/Yazidi beliefs that may influence understanding or compliance? No     Patient demonstrates understanding of the following:  Reason for the appointment, diagnosis and treatment plan: Yes  Knowledge of proper use of medications and conditions for which they are ordered (with special attention to potential side effects or drug interactions): Yes  Which situations necessitate calling provider and whom to contact: Yes- discussed the stoplight tool to help assist with this.      Teaching Concerns Addressed:      Proper use of surgical scrub explain: Yes    Nutritional needs and diet plan: Yes  Pain management techniques: Yes  Wound Care: Yes  How and/when to access community  resources: Yes     Instructional Materials Used/Given:  2 bottle of chlorhexidine and a surgery packet given to patient in clinic.      - Important contact info/ phone numbers: emphasizing clinic number and after hours number  - Map/ location of surgery  - Medications to avoid  - Showering instructions  - Stop light tool     -Next step: Schedule a surgery date and schedule a Pre-Op appointment with PCP     Time spent with patient: 15 minutes.         Aman Rincon MD

## 2023-09-26 NOTE — NURSING NOTE
Get outpatient bilirubin on 22 @ 9 am al lab of choice    1) Okay to discharge home with mom after rounds  2) Routine feeds   3) Watch for jaundice or increasing jaundice  4) No cobedding please  5) Please, no smoking in house, car, or around child. 6) GBS handout if Mom positive past or present  7) HSV handout if Mom or Dad positive past or present  8) Avoid crowds and sick people. 9) \"Back to sleep\", etc., with routine  teaching  8) Follow up PCP Dr. Monique Rasheed by 22  11) Good handwashing  12) Out patient bili in the am     2022,2:22 PM      Congratulations on the birth of your baby! Follow-up with your pediatrician within 2-5 days or sooner if recommended. If we are able to we will make the first appointment with this physician for you and provide you with that information at discharge. For Breastfeeding moms, you can contact our lactation specialists with any problems or questions you may have. Contact our Lactation Consultants at [phone]. Please feel free to leave a message and they will return your call. When to Call the Babys Doctor:  One of the toughest and most nerve-racking things for new moms is figuring out when to call the doctor. As a general rule of thumb, trust your instincts. If you suspect something is not right, you should always call the doctor. Even small changes in eating, sleeping, and crying can be signs of serious problems for newborns.    Call your pediatrician if your baby has any of the following symptoms:   No urine in first 6 hours at home    No bowel movement in the first 24 hours at home    Trouble breathing, very rapid breathing (more than 60 breaths per minute) or blue lips or finger nails , Pulling in of the ribs when breathing, Wheezing, grunting, or whistling sounds when breathing , call 911   Axillary temperature above 100.4° F or below 97.8° F   Yellow or greenish mucus in the eyes    Pus or red skin at the base of the umbilical cord Reason For Visit:   Chief Complaint   Patient presents with    Consult     Left ankle pain, chronic. Progressively worsened since childhood. NSAIDS, ice, brace, PT, and Injection (5 years ago, no relief) Very diffuse and deep pain.        Ht 1.829 m (6')   Wt 124.7 kg (275 lb)   LMP 08/01/2016 (Approximate)   BMI 37.30 kg/m           Jocelyn Bunn, ATC     American Academy of Pediatrics  [email or web address].diop.com/. org/English/health-issues/conditions/chest-lungs/Pages/Respiratory-Syncytial-Virus-RSV. aspx  Last Reviewed [id number]    If you were GBS positive during your pregnancy:  Symptoms  The symptoms of group B strep disease can seem like other health problems in newborns and babies. Most newborns with early-onset disease (occurs in babies younger than 4 week old) have symptoms on the day of birth. Babies who develop late-onset disease may appear healthy at birth and develop symptoms of group B strep disease after the first week through the first three months of life. Some symptoms include:  Fever   Difficulty feeding   Irritability or lethargy (limpness or hard to wake up the baby)   Difficulty breathing   Blue-emilio color to skin  Complications  For both early- and late-onset group B strep disease, and particularly for babies who had meningitis (infection of the fluid and lining around the brain and spinal cord), there may be long-term problems such as deafness and developmental disabilities. Care for sick babies has improved a lot in the [facility]. However, 2 to 3 out of every 50 babies (4 to 6%) who develop group B strep disease will die. On average, about 1,000 babies in the [facility] get early-onset group B strep disease each year (see [employer] website for more surveillance information), with rates higher among prematurely born babies (born before 42 weeks) and blacks. Group B strep bacteria may also cause some miscarriages, stillbirths, and  deliveries. However, there are many different factors that lead to stillbirth, pre-term delivery, or miscarriage and, most of the time, the cause is not known. Page last reviewed:  May 23, 2016 Page last updated: May 23, 2016 Content source:   [facility] Immunization and Respiratory Diseases, Division of Bacterial Diseases     Jaundice in Babies  What is jaundice? -- \"Jaundice\" is the word doctors use when a baby's skin or white part of the eye turns yellow. Jaundice is common in  babies and can happen within days of a baby's birth. Babies are usually checked for jaundice for a few days after they are born. Jaundice happens when a baby has high levels of a substance called \"bilirubin\" in the blood. Jaundice is a sign that a doctor needs to do a blood test to check the baby's bilirubin level. Babies can have high bilirubin levels for different reasons. For example, some babies who breastfeed can get jaundice because they do not get as much breast milk as they need. It is important that a baby gets checked for jaundice to see if he or she needs treatment, because very high bilirubin levels can lead to brain damage. How can I tell if my baby has jaundice? -- You can tell if your baby has jaundice by pressing one finger on your baby's nose or forehead. Then lift up your finger. If the skin is yellow where you pressed, your baby has jaundice. What are the symptoms of jaundice? -- Jaundice causes the skin and the white parts of the eyes to turn yellow. It often happens first in the face, but can spread to the chest, belly, and arms. It spreads to the legs last.  Sometimes, jaundice can be severe. A baby with severe jaundice can have orange-yellow skin, or yellow skin below the knee on the lower part of the leg. The \"whites\" of the eyes might look yellow, too. A baby with severe jaundice might also:  ? Be hard to wake up  ? Have a high-pitched cry  ? Be unhappy and keep crying  ? Keep bending his or her body or neck backward  When should I call my doctor or nurse? -- Call your doctor or nurse if:  ?Your baby's jaundice is getting worse  ? Your baby has symptoms of severe jaundice  Is there anything I can do on my own to help the jaundice get better? -- Yes. To help your baby's jaundice get better, you can make sure your baby drinks enough.  If you breastfeed your baby, make sure you breastfeed often that can cause cancer. Secondhand Smoke Harms Children and Adults  There is no risk-free level of secondhand smoke exposure; even brief exposure can be harmful to health. Since , approximately 2,500,000 nonsmokers have  from health problems caused by exposure to secondhand smoke. Health Effects in  [address]  In children, secondhand smoke causes the following:  Ear infections   More frequent and severe asthma attacks   Respiratory symptoms (for example, coughing, sneezing, and shortness of breath)   Respiratory infections (bronchitis and pneumonia)   A greater risk for sudden infant death syndrome (SIDS)  You can protect yourself and your family from secondhand smoke by:  Quitting smoking if you are not already a nonsmoker   Not allowing anyone to smoke anywhere in or near your home   Not allowing anyone to smoke in your car, even with the windows down   Making sure your childrens day care center and schools are tobacco-free   Seeking out restaurants and other places that do not allow smoking (if your state still allows smoking in public areas)   Teaching your children to stay away from secondhand smoke   Being a good role model by not smoking or using any other type of tobacco  Page last reviewed: 2017 Page last updated: 2017 Content source:   Office on Smoking and Health, [facility] for Chronic Disease Prevention and Health Promotion    Laying Your Baby Down To Sleep  Your new baby sleeps most of the time for the first few months. Babies may sleep 16 to 20 hours each day. Often, your baby may sleep for 3 to 4 hours at a time. The periods of sleep are often short and are not on a set pattern. Babies most often wake up at least one time during the night for a feeding. Some may sleep or eat more than others. Always keep in mind that each baby differs in some manner. Your  baby cannot control sleep.  It depends on how you handle your baby and how you put your baby to sleep. It is important that you feed your baby before putting your baby down to sleep. Babies often sleep, wake up when they are hungry, then sleep again. It is important that you learn your baby's habits and learn how to respond to your baby's basic needs. General   Good sleeping habits will help your baby sleep soundly. Here are some tips you can do to help your baby fall asleep. Before putting your baby to bed, make sure that:  The room is dark, quiet, and a comfortable temperature, not more than 68°F (20°C). Too warm is a risk for your baby while sleeping. Make sure that your baby's clothing does not have any ties or cords that could tangle around your baby. Start to teach your baby about daytime and night-time. When your baby is alert and awake during the day, play and talk with your baby most of the time. Keep the area bright. At night-time, do not play with your baby when your baby wakes up. Keep the area with low light and noise-free. Make it a habit to play with your baby during the day. If your baby is active during the day, your baby may have more sleep during night-time. How to Put Your [place] Baby to Sleep   Make a bedtime routine for your baby. Put your baby to bed at the same time each day. Turn down lights and noise. Watch for signs that will tell you when your  needs to sleep. When you begin to see that your baby is tired, prepare your baby for sleep. Signs of tiredness may be rubbing his eyes, yawning, or fussing. Give your baby a bath before bedtime. Change your baby's diaper and make sure that your baby wears comfortable and clean clothing. Bedtime habits will make your  calm and feel that it is time to sleep. Some babies sleep better when they are swaddled. Ask your doctor to show you how to swaddle your baby. Stop swaddling your baby before your baby starts to roll over. Most times, you will need to stop swaddling your baby by 3months of age.   Always place your baby on his back to sleep if swaddled. Monitor your baby when swaddled. Check to make sure your baby has not rolled over. Also, make sure the swaddle blanket has not come loose. Keep the swaddle blanket loose around your baby's hips. You can play soothing music for your . Rock or hold your baby until your baby becomes sleepy. Put your baby in a crib while your baby is still awake. This will help your  learn to fall asleep on his own. Always lay your baby on his back to sleep. Never put your baby on a pillow when sleeping. Will there be any other care needed? Do not let your  sleep in your bed. You may accidentally suffocate your . You can put your baby to sleep in the same room, in the crib. Keep your 's crib clean and free from toys and other objects that may block breathing. It is rarely needed to wake your baby for a diaper change. If your baby will not go to sleep, check these things. Your baby may need:  A diaper change  To be fed  More or less clothes if too cold or warm  You can  your baby and rock until sleepy. You can leave a pacifier in place until your baby falls to sleep. Ask your doctor if you have any concerns about the use of a pacifier. What problems could happen? If you feel stressed and frustrated because your baby will not go to sleep, try these steps: Take a deep breath and relax for a few seconds. Take a break. It is okay to let your baby cry. Leave your baby in a safe place such as the crib. Sometimes, your baby may cry to sleep. Never shake your baby. It can lead to serious brain damage and other health problems. Get someone to help you and give emotional support. Ask family or friends for support. If your baby cries a lot, there may be a more serious concern needed. Call your baby's doctor. If you have any concerns, call your baby's doctor right away. When do I need to call the doctor?    If you are concerned about the length of time your baby sleeps. Your baby becomes:  Irritable and cannot be soothed  Hard to wake from sleep  Does not want to be fed  Cries more than usual  Helping Your  Sleep  Newborns follow their own schedule. Over the next couple of weeks to months, you and your baby will begin to settle into a routine. It may take a few weeks for your baby's brain to know the difference between night and day. Unfortunately, there are no tricks to speed this up, but it helps to keep things quiet and calm during middle-of-the-night feedings and diaper changes. Try to keep the lights low and resist the urge to play with or talk to your baby. This will send the message that nighttime is for sleeping. If possible, let your baby fall asleep in the crib at night so your little one learns that it's the place for sleep. Don't try to keep your baby up during the day in the hopes that he or she will sleep better at night. [place] tired infants often have more trouble sleeping at night than those who've had enough sleep during the day. If your  is fussy it's OK to rock, cuddle, and sing as your baby settles down. For the first months of your baby's life, \"spoiling\" is definitely not a problem. (In fact, newborns who are held or carried during the day tend to have less colic and fussiness.)  When to Call the Doctor  While most parents can expect their  to sleep or catnap a lot during the day, the range of what is normal is quite wide. If you have questions about your baby's sleep, talk with your doctor. Reviewed by: Niharika Man MD   Date reviewed: 2016

## 2023-09-26 NOTE — PROGRESS NOTES
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  Patient will remain nonweightbearing for a full year and since then she reports to have a progressive deterioration of the ankle with pain and swelling.    Patient reports to have had an injection approximately 5 years ago which was not effective improving her symptoms however the injection apparently was done freehand in the office.    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  Reports to have a fair amount of swelling with any degree of activities let alone the pain and discomfort that she is experiencing.    We reviewed today her past medical and surgical history current medications and drug allergies    On today's visit she presents a very pleasant female in nonapparent distress with weight of 275 pounds and a BMI of 37.3    On today's exam she presents with a 40 most part no motion across the ankle joint with no more than 5 to 7 degrees of plantar and dorsiflexion alignment is excellent there is palpable pulses there is some diffuse swelling of the lower legs.  Forefoot exam is unremarkable.    Plan x-rays in my previous visit were reviewed today which are significant for showing a fair amount of posterior arthritis across the left ankle with a large amount of osteophyte formation anteriorly.  Alignment is excellent.  The subtalar and talonavicular joints present a very healthy appearance.    Assessment: Left ankle arthritis    Plan: I discussed with the patient that at this point she is not creating any damage by having pain or discomfort.  We discussed the possibility of undergoing an ankle replacement versus ankle  fusion.  My advice was to proceed with an ankle fusion something she is interested on.    We discussed the most likely postoperative course and complications from undergoing a left ankle arthrodesis.  Complications included but not limited to infection bleeding nerve damage residual pain nonunion and stiffness    Patient is planning to have the surgery sometime in mid December 2023 and in the meantime organ to proceed with laparoscopic guided injection into the left ankle with lidocaine and Kenalog for a diagnosis of osteoarthritis.    All questions were answered.  Patient was pleased with discussion.  She has no activity restrictions.    TT 30 minutes

## 2023-09-28 ENCOUNTER — TELEPHONE (OUTPATIENT)
Dept: ORTHOPEDICS | Facility: CLINIC | Age: 58
End: 2023-09-28
Payer: COMMERCIAL

## 2023-09-28 NOTE — TELEPHONE ENCOUNTER
Phoned patient to schedule surgery with Dr Rincon. I left her my direct number to call back at her convenience. [phone]

## 2023-09-29 NOTE — TELEPHONE ENCOUNTER
Received voicemail from patient calling back to schedule surgery with Dr. Rincon.     Phoned patient back; call went straight to voicemail.     Provided reason of call and call back number of [phone] to schedule and [phone] for care team.     Will try again.

## 2023-09-29 NOTE — TELEPHONE ENCOUNTER
Patient is scheduled for surgery with Dr. Rincon     Spoke with: Jacqui    Date of Surgery: 12/27/23     Location: [facility]    Informed patient they will need an adult  Yes    Pre op with Provider PCP, patient will schedule    Additional imaging/appointments: POP Made    Surgery packet: Received     Additional comments: N/A        Nhung Childers on 9/29/2023 at 4:10 PM

## 2023-10-06 ENCOUNTER — ANCILLARY PROCEDURE (OUTPATIENT)
Dept: INTERVENTIONAL RADIOLOGY/VASCULAR | Facility: CLINIC | Age: 58
End: 2023-10-06
Attending: ORTHOPAEDIC SURGERY
Payer: COMMERCIAL

## 2023-10-06 DIAGNOSIS — M19.072 PRIMARY OSTEOARTHRITIS OF LEFT ANKLE: ICD-10-CM

## 2023-10-06 PROCEDURE — 77002 NEEDLE LOCALIZATION BY XRAY: CPT | Mod: GC | Performed by: RADIOLOGY

## 2023-10-06 PROCEDURE — 20605 DRAIN/INJ JOINT/BURSA W/O US: CPT | Mod: LT | Performed by: RADIOLOGY

## 2023-10-06 RX ORDER — BUPIVACAINE HYDROCHLORIDE 2.5 MG/ML
25 INJECTION, SOLUTION EPIDURAL; INFILTRATION; INTRACAUDAL ONCE
Status: COMPLETED | OUTPATIENT
Start: 2023-10-06 | End: 2023-10-06

## 2023-10-06 RX ORDER — TRIAMCINOLONE ACETONIDE 40 MG/ML
40 INJECTION, SUSPENSION INTRA-ARTICULAR; INTRAMUSCULAR ONCE
Status: COMPLETED | OUTPATIENT
Start: 2023-10-06 | End: 2023-10-06

## 2023-10-06 RX ORDER — LIDOCAINE HYDROCHLORIDE 10 MG/ML
5 INJECTION, SOLUTION EPIDURAL; INFILTRATION; INTRACAUDAL; PERINEURAL ONCE
Status: COMPLETED | OUTPATIENT
Start: 2023-10-06 | End: 2023-10-06

## 2023-10-06 RX ORDER — IOPAMIDOL 408 MG/ML
10 INJECTION, SOLUTION INTRATHECAL ONCE
Status: COMPLETED | OUTPATIENT
Start: 2023-10-06 | End: 2023-10-06

## 2023-10-06 RX ADMIN — LIDOCAINE HYDROCHLORIDE 5 ML: 10 INJECTION, SOLUTION EPIDURAL; INFILTRATION; INTRACAUDAL; PERINEURAL at 10:31

## 2023-10-06 RX ADMIN — TRIAMCINOLONE ACETONIDE 40 MG: 40 INJECTION, SUSPENSION INTRA-ARTICULAR; INTRAMUSCULAR at 10:31

## 2023-10-06 RX ADMIN — BUPIVACAINE HYDROCHLORIDE 25 MG: 2.5 INJECTION, SOLUTION EPIDURAL; INFILTRATION; INTRACAUDAL at 10:32

## 2023-10-06 RX ADMIN — IOPAMIDOL 10 ML: 408 INJECTION, SOLUTION INTRATHECAL at 10:32

## 2023-10-10 ENCOUNTER — TRANSFERRED RECORDS (OUTPATIENT)
Dept: HEALTH INFORMATION MANAGEMENT | Facility: CLINIC | Age: 58
End: 2023-10-10
Payer: COMMERCIAL

## 2023-10-17 ENCOUNTER — TELEPHONE (OUTPATIENT)
Dept: ORTHOPEDICS | Facility: CLINIC | Age: 58
End: 2023-10-17
Payer: COMMERCIAL

## 2023-10-17 NOTE — CONFIDENTIAL NOTE
Patient returned writer's call, warm transfer from call center. 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 [facility] and is scheduled to undergo excision with linear closure on November 6th, 2023. She was told she would not need additional treatment.  will discuss with Dr. Rincon and update her with plan next week.     Tara Holter, RNCC

## 2023-10-17 NOTE — TELEPHONE ENCOUNTER
Pt called, stating she has an upcoming surgery with MD Sergey. Pt has a change in medical condition that she would like to discuss. Please CALL pt to discuss.

## 2023-10-25 NOTE — CONFIDENTIAL NOTE
Discussed procedure prior to ankle surgery with Dr. Rincon. Ok to proceed with surgery if there is a scab, as long as there is no drainage. Patient does not need to come to clinic prior to surgery. Call placed to patient to relay information. She will check in early December via [employer]. Patient verbalized understanding and will contact us if anything changes.     Tara Holter, RNCC

## 2023-11-06 ASSESSMENT — ENCOUNTER SYMPTOMS
ABDOMINAL PAIN: 0
SHORTNESS OF BREATH: 0
NERVOUS/ANXIOUS: 0
SORE THROAT: 0
MYALGIAS: 0
HEADACHES: 0
HEARTBURN: 1
CHILLS: 0
DYSURIA: 0
EYE PAIN: 0
HEMATURIA: 0
DIZZINESS: 0
PARESTHESIAS: 0
WEAKNESS: 0
FREQUENCY: 1
COUGH: 0
ARTHRALGIAS: 1
FEVER: 0
PALPITATIONS: 0
JOINT SWELLING: 1
DIARRHEA: 0
NAUSEA: 0
BREAST MASS: 0
CONSTIPATION: 0
HEMATOCHEZIA: 0

## 2023-11-07 ENCOUNTER — OFFICE VISIT (OUTPATIENT)
Dept: FAMILY MEDICINE | Facility: CLINIC | Age: 58
End: 2023-11-07
Payer: COMMERCIAL

## 2023-11-07 VITALS
DIASTOLIC BLOOD PRESSURE: 78 MMHG | BODY MASS INDEX: 39.68 KG/M2 | OXYGEN SATURATION: 96 % | HEIGHT: 72 IN | WEIGHT: 293 LBS | HEART RATE: 68 BPM | SYSTOLIC BLOOD PRESSURE: 135 MMHG | RESPIRATION RATE: 15 BRPM | TEMPERATURE: 96.7 F

## 2023-11-07 DIAGNOSIS — Z13.1 SCREENING FOR DIABETES MELLITUS: ICD-10-CM

## 2023-11-07 DIAGNOSIS — Z12.11 SCREEN FOR COLON CANCER: ICD-10-CM

## 2023-11-07 DIAGNOSIS — R06.83 SNORING: ICD-10-CM

## 2023-11-07 DIAGNOSIS — Z13.220 SCREENING CHOLESTEROL LEVEL: ICD-10-CM

## 2023-11-07 DIAGNOSIS — K21.00 GASTROESOPHAGEAL REFLUX DISEASE WITH ESOPHAGITIS WITHOUT HEMORRHAGE: ICD-10-CM

## 2023-11-07 DIAGNOSIS — Z12.4 CERVICAL CANCER SCREENING: ICD-10-CM

## 2023-11-07 DIAGNOSIS — C43.9 MELANOMA OF SKIN (H): ICD-10-CM

## 2023-11-07 DIAGNOSIS — Z00.00 ROUTINE HISTORY AND PHYSICAL EXAMINATION OF ADULT: Primary | ICD-10-CM

## 2023-11-07 DIAGNOSIS — Z12.11 SCREENING FOR COLON CANCER: ICD-10-CM

## 2023-11-07 DIAGNOSIS — Z23 ENCOUNTER FOR VACCINATION: ICD-10-CM

## 2023-11-07 DIAGNOSIS — B00.1 COLD SORE: ICD-10-CM

## 2023-11-07 DIAGNOSIS — Z13.29 SCREENING FOR THYROID DISORDER: ICD-10-CM

## 2023-11-07 LAB
ALBUMIN SERPL BCG-MCNC: 4.1 G/DL (ref 3.5–5.2)
ALP SERPL-CCNC: 67 U/L (ref 35–104)
ALT SERPL W P-5'-P-CCNC: 18 U/L (ref 0–50)
ANION GAP SERPL CALCULATED.3IONS-SCNC: 9 MMOL/L (ref 7–15)
AST SERPL W P-5'-P-CCNC: 17 U/L (ref 0–45)
BILIRUB SERPL-MCNC: 0.3 MG/DL
BUN SERPL-MCNC: 9.6 MG/DL (ref 6–20)
CALCIUM SERPL-MCNC: 9 MG/DL (ref 8.6–10)
CHLORIDE SERPL-SCNC: 106 MMOL/L (ref 98–107)
CHOLEST SERPL-MCNC: 225 MG/DL
CREAT SERPL-MCNC: 0.89 MG/DL (ref 0.51–0.95)
DEPRECATED HCO3 PLAS-SCNC: 27 MMOL/L (ref 22–29)
EGFRCR SERPLBLD CKD-EPI 2021: 75 ML/MIN/1.73M2
GLUCOSE SERPL-MCNC: 95 MG/DL (ref 70–99)
HBA1C MFR BLD: 5.2 % (ref 0–5.6)
HDLC SERPL-MCNC: 65 MG/DL
LDLC SERPL CALC-MCNC: 142 MG/DL
NONHDLC SERPL-MCNC: 160 MG/DL
POTASSIUM SERPL-SCNC: 4.3 MMOL/L (ref 3.4–5.3)
PROT SERPL-MCNC: 6.7 G/DL (ref 6.4–8.3)
SODIUM SERPL-SCNC: 142 MMOL/L (ref 135–145)
TRIGL SERPL-MCNC: 89 MG/DL
TSH SERPL DL<=0.005 MIU/L-ACNC: 1.1 UIU/ML (ref 0.3–4.2)

## 2023-11-07 PROCEDURE — 36415 COLL VENOUS BLD VENIPUNCTURE: CPT | Performed by: NURSE PRACTITIONER

## 2023-11-07 PROCEDURE — 99396 PREV VISIT EST AGE 40-64: CPT | Mod: 25 | Performed by: NURSE PRACTITIONER

## 2023-11-07 PROCEDURE — 99214 OFFICE O/P EST MOD 30 MIN: CPT | Mod: 25 | Performed by: NURSE PRACTITIONER

## 2023-11-07 PROCEDURE — 90471 IMMUNIZATION ADMIN: CPT | Performed by: NURSE PRACTITIONER

## 2023-11-07 PROCEDURE — 80053 COMPREHEN METABOLIC PANEL: CPT | Performed by: NURSE PRACTITIONER

## 2023-11-07 PROCEDURE — 87624 HPV HI-RISK TYP POOLED RSLT: CPT | Performed by: NURSE PRACTITIONER

## 2023-11-07 PROCEDURE — G0145 SCR C/V CYTO,THINLAYER,RESCR: HCPCS | Performed by: NURSE PRACTITIONER

## 2023-11-07 PROCEDURE — 80061 LIPID PANEL: CPT | Performed by: NURSE PRACTITIONER

## 2023-11-07 PROCEDURE — 90480 ADMN SARSCOV2 VAC 1/ONLY CMP: CPT | Performed by: NURSE PRACTITIONER

## 2023-11-07 PROCEDURE — 83036 HEMOGLOBIN GLYCOSYLATED A1C: CPT | Performed by: NURSE PRACTITIONER

## 2023-11-07 PROCEDURE — 84443 ASSAY THYROID STIM HORMONE: CPT | Performed by: NURSE PRACTITIONER

## 2023-11-07 PROCEDURE — 91320 SARSCV2 VAC 30MCG TRS-SUC IM: CPT | Performed by: NURSE PRACTITIONER

## 2023-11-07 PROCEDURE — 90686 IIV4 VACC NO PRSV 0.5 ML IM: CPT | Performed by: NURSE PRACTITIONER

## 2023-11-07 RX ORDER — OMEPRAZOLE 40 MG/1
40 CAPSULE, DELAYED RELEASE ORAL DAILY
Qty: 90 CAPSULE | Refills: 0 | Status: SHIPPED | OUTPATIENT
Start: 2023-11-07 | End: 2024-03-04

## 2023-11-07 RX ORDER — VALACYCLOVIR HYDROCHLORIDE 1 G/1
2000 TABLET, FILM COATED ORAL 2 TIMES DAILY
Qty: 4 TABLET | Refills: 4 | Status: SHIPPED | OUTPATIENT
Start: 2023-11-07

## 2023-11-07 ASSESSMENT — ENCOUNTER SYMPTOMS
NERVOUS/ANXIOUS: 0
HEMATURIA: 0
COUGH: 0
EYE PAIN: 0
SHORTNESS OF BREATH: 0
SORE THROAT: 0
MYALGIAS: 0
NAUSEA: 0
DYSURIA: 0
CHILLS: 0
FREQUENCY: 1
BREAST MASS: 0
JOINT SWELLING: 1
DIZZINESS: 0
HEMATOCHEZIA: 0
FEVER: 0
PALPITATIONS: 0
HEADACHES: 0
HEARTBURN: 1
CONSTIPATION: 0
WEAKNESS: 0
ABDOMINAL PAIN: 0
ARTHRALGIAS: 1
DIARRHEA: 0
PARESTHESIAS: 0

## 2023-11-07 ASSESSMENT — PAIN SCALES - GENERAL: PAINLEVEL: NO PAIN (0)

## 2023-11-07 NOTE — PROGRESS NOTES
SUBJECTIVE:   CC: Jacqui is an 58 year old who presents for preventive health visit.       11/7/2023     8:23 AM   Additional Questions   Roomed by Nader PINZON       Healthy Habits:     Getting at least 3 servings of Calcium per day:  Yes    Bi-annual eye exam:  NO    Dental care twice a year:  Yes    Sleep apnea or symptoms of sleep apnea:  Daytime drowsiness and Excessive snoring    Diet:  Regular (no restrictions)    Frequency of exercise:  1 day/week    Duration of exercise:  Less than 15 minutes    Taking medications regularly:  Yes    Medication side effects:  Not applicable    Additional concerns today:  Yes    -Would like a referral to sleep medicine to discuss snoring. Wakes up frequently during the night. No gasping for air. Often feels tired when she wakes up in the morning.Has not had a sleep study performed in the past.  -She will be having an ankle fixation surgery in December, to relieve pain she has had in the joint since she was a child.   -Would like a referral to weight management. Has not previously met with them in the past; not sure if she is interested in medication at this time.  -worsening GERD symptoms recently; has noticed she gets nausea/vomiting if eating late at night. Takes OTC medications occasionally, which are helpful. NO constipation/diarrhea. NO abdominal pains. NO vomiting with blood.  -------------------------------------  Have you ever done Advance Care Planning? (For example, a Health Directive, POLST, or a discussion with a medical provider or your loved ones about your wishes): No, advance care planning information given to patient to review.  Patient declined advance care planning discussion at this time.    Social History     Tobacco Use    Smoking status: Never    Smokeless tobacco: Never   Substance Use Topics    Alcohol use: Yes     Comment: 3/week             11/6/2023     1:25 PM   Alcohol Use   Prescreen: >3 drinks/day or >7 drinks/week? No     Reviewed orders with  patient.  Reviewed health maintenance and updated orders accordingly - Yes  Lab work is in process  Labs reviewed in EPIC    Breast Cancer Screening:    FHS-7:       8/15/2023     1:06 PM 11/6/2023     1:28 PM   Breast CA Risk Assessment (FHS-7)   Did any of your first-degree relatives have breast or ovarian cancer? No No   Did any of your relatives have bilateral breast cancer? No Yes   Did any man in your family have breast cancer? No No   Did any woman in your family have breast and ovarian cancer? No Yes   Did any woman in your family have breast cancer before age 50 y? No No   Do you have 2 or more relatives with breast and/or ovarian cancer? Yes Yes   Do you have 2 or more relatives with breast and/or bowel cancer? No No     click delete button to remove this line now  Mammogram Screening: Recommended mammography every 1-2 years with patient discussion and risk factor consideration  Pertinent mammograms are reviewed under the imaging tab.    History of abnormal Pap smear: NO - age 30-65 PAP every 5 years with negative HPV co-testing recommended      Latest Ref Rng & Units 8/19/2016    10:49 AM 8/19/2016    12:00 AM   PAP / HPV   PAP (Historical)   NIL    HPV 16 DNA NEG Negative     HPV 18 DNA NEG Negative     Other HR HPV NEG Negative       Reviewed and updated as needed this visit by clinical staff   Tobacco  Allergies  Meds              Reviewed and updated as needed this visit by Provider                     Review of Systems   Constitutional:  Negative for chills and fever.   HENT:  Negative for congestion, ear pain, hearing loss and sore throat.    Eyes:  Negative for pain and visual disturbance.   Respiratory:  Negative for cough and shortness of breath.    Cardiovascular:  Negative for chest pain, palpitations and peripheral edema.   Gastrointestinal:  Positive for heartburn. Negative for abdominal pain, constipation, diarrhea, hematochezia and nausea.   Breasts:  Negative for tenderness, breast mass  and discharge.   Genitourinary:  Positive for frequency and urgency. Negative for dysuria, genital sores, hematuria, pelvic pain, vaginal bleeding and vaginal discharge.   Musculoskeletal:  Positive for arthralgias and joint swelling. Negative for myalgias.   Skin:  Negative for rash.   Neurological:  Negative for dizziness, weakness, headaches and paresthesias.   Psychiatric/Behavioral:  Negative for mood changes. The patient is not nervous/anxious.           OBJECTIVE:   BP (!) 148/98 (BP Location: Right arm, Patient Position: Sitting, Cuff Size: Adult Large)   Pulse 68   Temp (!) 96.7  F (35.9  C) (Temporal)   Resp 15   Ht 1.829 m (6')   Wt 134.7 kg (297 lb)   LMP 08/01/2016 (Approximate)   SpO2 96%   BMI 40.28 kg/m    Physical Exam  GENERAL: healthy, alert and no distress  EYES: Eyes grossly normal to inspection, PERRL and conjunctivae and sclerae normal  HENT: ear canals and TM's normal, nose and mouth without ulcers or lesions  NECK: no adenopathy, no asymmetry, masses, or scars and thyroid normal to palpation  RESP: lungs clear to auscultation - no rales, rhonchi or wheezes  BREAST: normal without masses, tenderness or nipple discharge and no palpable axillary masses or adenopathy  CV: regular rate and rhythm, normal S1 S2, no S3 or S4, no murmur, click or rub, no peripheral edema and peripheral pulses strong  ABDOMEN: soft, nontender, no hepatosplenomegaly, no masses and bowel sounds normal   (female): normal female external genitalia, normal urethral meatus, vaginal mucosa, normal cervix/adnexa/uterus without masses or discharge  MS: no gross musculoskeletal defects noted, no edema  SKIN: no suspicious lesions or rashes  NEURO: Normal strength and tone, mentation intact and speech normal  PSYCH: mentation appears normal, affect normal/bright    Diagnostic Test Results:  Labs reviewed in Epic  Results for orders placed or performed in visit on 11/07/23 (from the past 24 hour(s))   **Hemoglobin A1c  FUTURE 3mo   Result Value Ref Range    Hemoglobin A1C 5.2 0.0 - 5.6 %       ASSESSMENT/PLAN:       ICD-10-CM    1. Routine history and physical examination of adult  Z00.00       2. Screen for colon cancer  Z12.11       3. Cervical cancer screening  Z12.4 Pap Screen with HPV - recommended age 30 - 65 years      4. Melanoma of skin (H)  C43.9       5. Screening cholesterol level  Z13.220 Lipid panel reflex to direct LDL Non-fasting     Lipid panel reflex to direct LDL Non-fasting      6. Screening for diabetes mellitus  Z13.1 Comprehensive metabolic panel (BMP + Alb, Alk Phos, ALT, AST, Total. Bili, TP)     **Hemoglobin A1c FUTURE 3mo     Comprehensive metabolic panel (BMP + Alb, Alk Phos, ALT, AST, Total. Bili, TP)     **Hemoglobin A1c FUTURE 3mo      7. Screening for colon cancer  Z12.11 Colonoscopy Screening  Referral      8. Screening for thyroid disorder  Z13.29 **TSH with free T4 reflex FUTURE 2mo     **TSH with free T4 reflex FUTURE 2mo      9. Encounter for vaccination  Z23 COVID-19 12+ (2023-24) (PFIZER)     CANCELED: INFLUENZA VACCINE 18-64Y (FLUBLOK)      10. Morbid obesity (H)  E66.01 Adult Sleep Eval & Management  Referral     Adult Comprehensive Weight Management  Referral      11. Snoring  R06.83 Adult Sleep Eval & Management  Referral      12. Gastroesophageal reflux disease with esophagitis without hemorrhage  K21.00 omeprazole (PRILOSEC) 40 MG DR capsule      13. Cold sore  B00.1 valACYclovir (VALTREX) 1000 mg tablet            COUNSELING:  Reviewed preventive health counseling, as reflected in patient instructions       Regular exercise       Healthy diet/nutrition       Colorectal Cancer Screening       (Yecenia)menopause management      BMI:   Estimated body mass index is 40.28 kg/m  as calculated from the following:    Height as of this encounter: 1.829 m (6').    Weight as of this encounter: 134.7 kg (297 lb).   Weight management plan: Patient referred to  endocrine and/or weight management specialty      She reports that she has never smoked. She has never used smokeless tobacco.        KAYLIE Latham CNP [facility]

## 2023-11-08 ENCOUNTER — DOCUMENTATION ONLY (OUTPATIENT)
Dept: ORTHOPEDICS | Facility: CLINIC | Age: 58
End: 2023-11-08
Payer: COMMERCIAL

## 2023-11-08 NOTE — PROGRESS NOTES
Received Completed forms Yes   Faxed Forms Faxed To: [facility]  Fax Number: [phone]   Sent to HIM (Date) 11/8/23

## 2023-11-09 LAB
BKR LAB AP GYN ADEQUACY: NORMAL
BKR LAB AP GYN INTERPRETATION: NORMAL
BKR LAB AP HPV REFLEX: NORMAL
BKR LAB AP LMP: NORMAL
BKR LAB AP PREVIOUS ABNORMAL: NORMAL
PATH REPORT.COMMENTS IMP SPEC: NORMAL
PATH REPORT.COMMENTS IMP SPEC: NORMAL
PATH REPORT.RELEVANT HX SPEC: NORMAL

## 2023-11-13 LAB
HUMAN PAPILLOMA VIRUS 16 DNA: NEGATIVE
HUMAN PAPILLOMA VIRUS 18 DNA: NEGATIVE
HUMAN PAPILLOMA VIRUS FINAL DIAGNOSIS: NORMAL
HUMAN PAPILLOMA VIRUS OTHER HR: NEGATIVE

## 2023-11-22 ENCOUNTER — TRANSFERRED RECORDS (OUTPATIENT)
Dept: HEALTH INFORMATION MANAGEMENT | Facility: CLINIC | Age: 58
End: 2023-11-22

## 2023-12-05 ENCOUNTER — TELEPHONE (OUTPATIENT)
Dept: ORTHOPEDICS | Facility: CLINIC | Age: 58
End: 2023-12-05
Payer: COMMERCIAL

## 2023-12-05 DIAGNOSIS — Z98.890 STATUS POST FOOT SURGERY: Primary | ICD-10-CM

## 2023-12-05 NOTE — TELEPHONE ENCOUNTER
I spoke with Jacqui and let her know that I will place those orders for her. I will email them to her at her request.  Jocelyn Bunn ATC

## 2023-12-05 NOTE — TELEPHONE ENCOUNTER
[facility] Call Center    Phone Message    May a detailed message be left on voicemail: yes     Reason for Call: Other: Patient is requesting a call back to discuss getting a knee scooter and a toilet seat lift before her surgery.     Action Taken: Message routed to:  [facility] ortho    Travel Screening: Not Applicable

## 2023-12-06 ENCOUNTER — OFFICE VISIT (OUTPATIENT)
Dept: FAMILY MEDICINE | Facility: CLINIC | Age: 58
End: 2023-12-06
Payer: COMMERCIAL

## 2023-12-06 ENCOUNTER — TRANSFERRED RECORDS (OUTPATIENT)
Dept: HEALTH INFORMATION MANAGEMENT | Facility: CLINIC | Age: 58
End: 2023-12-06

## 2023-12-06 VITALS
DIASTOLIC BLOOD PRESSURE: 84 MMHG | SYSTOLIC BLOOD PRESSURE: 126 MMHG | HEIGHT: 71 IN | TEMPERATURE: 97.8 F | WEIGHT: 293 LBS | OXYGEN SATURATION: 99 % | HEART RATE: 99 BPM | BODY MASS INDEX: 41.02 KG/M2 | RESPIRATION RATE: 16 BRPM

## 2023-12-06 DIAGNOSIS — R06.83 SNORING: ICD-10-CM

## 2023-12-06 DIAGNOSIS — M19.072 PRIMARY OSTEOARTHRITIS OF LEFT ANKLE: ICD-10-CM

## 2023-12-06 DIAGNOSIS — Z01.818 PRE-OP EXAM: Primary | ICD-10-CM

## 2023-12-06 PROCEDURE — 99214 OFFICE O/P EST MOD 30 MIN: CPT | Performed by: NURSE PRACTITIONER

## 2023-12-06 ASSESSMENT — PAIN SCALES - GENERAL: PAINLEVEL: NO PAIN (0)

## 2023-12-06 NOTE — PROGRESS NOTES
[address]  [address]  [facility] [address]  Phone: [phone]  Fax: [phone]  Primary Provider: Bijal Browne  Pre-op Performing Provider: BIJAL BROWNE      PREOPERATIVE EVALUATION:  Today's date: 12/6/2023    Jacqui is a 58 year old, presenting for the following:  Pre-Op Exam      Surgical Information:  Surgery/Procedure: FUSION, JOINT, ANKLE   Surgery Location: [facility]   Surgeon: Aman Rincon MD   Surgery Date: 12/27/2023  Time of Surgery: 7:30 AM  Where patient plans to recover: At home with family  Fax number for surgical facility: Note does not need to be faxed, will be available electronically in Epic.      Subjective       HPI related to upcoming procedure: Left Ankle Osteoarthritis        12/6/2023     1:59 PM   Preop Questions   1. Have you ever had a heart attack or stroke? No   2. Have you ever had surgery on your heart or blood vessels, such as a stent placement, a coronary artery bypass, or surgery on an artery in your head, neck, heart, or legs? No   3. Do you have chest pain with activity? No   4. Do you have a history of  heart failure? No   5. Do you currently have a cold, bronchitis or symptoms of other infection? No   6. Do you have a cough, shortness of breath, or wheezing? No   7. Do you or anyone in your family have previous history of blood clots? No   8. Do you or does anyone in your family have a serious bleeding problem such as prolonged bleeding following surgeries or cuts? No   9. Have you ever had problems with anemia or been told to take iron pills? No   10. Have you had any abnormal blood loss such as black, tarry or bloody stools, or abnormal vaginal bleeding? No   11. Have you ever had a blood transfusion? No   12. Are you willing to have a blood transfusion if it is medically needed before, during, or after your surgery? Yes   13. Have you or any of your  relatives ever had problems with anesthesia? No   14. Do you have sleep apnea, excessive snoring or daytime drowsiness? YES - Daytime fatigue, snoring. Has never had a sleep study.   14a. Do you have a CPAP machine? No   15. Do you have any artifical heart valves or other implanted medical devices like a pacemaker, defibrillator, or continuous glucose monitor? No   16. Do you have artificial joints? No   17. Are you allergic to latex? No   18. Is there any chance that you may be pregnant? No       Health Care Directive:  Patient does not have a Health Care Directive or Living Will: Discussed advance care planning with patient; information given to patient to review.    Preoperative Review of :   reviewed - no record of controlled substances prescribed.      Review of Systems  CONSTITUTIONAL: NEGATIVE for fever, chills, change in weight  INTEGUMENTARY/SKIN: NEGATIVE for worrisome rashes, moles or lesions  EYES: NEGATIVE for vision changes or irritation  ENT/MOUTH: NEGATIVE for ear, mouth and throat problems  RESP: NEGATIVE for significant cough or SOB  CV: NEGATIVE for chest pain, palpitations or peripheral edema  GI: NEGATIVE for nausea, abdominal pain, heartburn, or change in bowel habits  : NEGATIVE for frequency, dysuria, or hematuria  MUSCULOSKELETAL: NEGATIVE for significant arthralgias or myalgia  NEURO: NEGATIVE for weakness, dizziness or paresthesias  ENDOCRINE: NEGATIVE for temperature intolerance, skin/hair changes  HEME: NEGATIVE for bleeding problems  PSYCHIATRIC: NEGATIVE for changes in mood or affect    Patient Active Problem List    Diagnosis Date Noted    Melanoma of skin (H) 11/07/2023     Priority: Medium    Adjustment disorder with anxious mood 12/03/2018     Priority: Medium    Ankle pain, left 09/07/2018     Priority: Medium    Post-traumatic osteoarthritis of right knee 07/11/2016     Priority: Medium    Tear film insufficiency 07/18/2014     Priority: Medium     Problem list name  "updated by automated process. Provider to review      Chronic allergic conjunctivitis - Both Eyes 07/18/2014     Priority: Medium      Past Medical History:   Diagnosis Date    Pain in joint, ankle and foot, left     traumatic injury in childhood    Post-traumatic osteoarthritis of right knee 07/11/2016     Past Surgical History:   Procedure Laterality Date    right knee      Meniscus injection    STRIP VEIN Bilateral      Current Outpatient Medications   Medication Sig Dispense Refill    IBUPROFEN PO       multivitamin w/minerals (THERA-VIT-M) tablet Take 1 tablet by mouth daily      omeprazole (PRILOSEC) 40 MG DR capsule Take 1 capsule (40 mg) by mouth daily 90 capsule 0    valACYclovir (VALTREX) 1000 mg tablet Take 2 tablets (2,000 mg) by mouth 2 times daily 4 tablet 4       Allergies   Allergen Reactions    Pcn [Penicillins]         Social History     Tobacco Use    Smoking status: Never    Smokeless tobacco: Never   Substance Use Topics    Alcohol use: Yes     Comment: 3/week     Family History   Problem Relation Age of Onset    Hypertension Mother     Eye Surgery Mother     GERD Mother     Lung Cancer Mother     Diabetes Father     Eye Surgery Father     Coronary Artery Disease Father     Heart Failure Father     No Known Problems Sister     No Known Problems Sister     Breast Cancer Maternal Grandmother         later in life    Alcoholism Maternal Grandfather     No Known Problems Paternal Grandmother     No Known Problems Paternal Grandfather     Breast Cancer Maternal Aunt         later in life    No Known Problems Daughter     No Known Problems Daughter     Glaucoma No family hx of     Macular Degeneration No family hx of      History   Drug Use No         Objective     /84 (BP Location: Right arm, Patient Position: Sitting, Cuff Size: Adult Large)   Pulse 99   Temp 97.8  F (36.6  C) (Oral)   Resp 16   Ht 1.803 m (5' 10.98\")   Wt 137.2 kg (302 lb 8 oz)   LMP 08/01/2016 (Approximate)   SpO2 99% "   BMI 42.21 kg/m      Physical Exam    GENERAL APPEARANCE: healthy, alert and no distress     EYES: EOMI, PERRL     HENT: ear canals and TM's normal and nose and mouth without ulcers or lesions     NECK: no adenopathy, no asymmetry, masses, or scars and thyroid normal to palpation     RESP: lungs clear to auscultation - no rales, rhonchi or wheezes     CV: regular rates and rhythm, normal S1 S2, no S3 or S4 and no murmur, click or rub     ABDOMEN:  soft, nontender, no HSM or masses and bowel sounds normal     MS: extremities normal- no gross deformities noted, no evidence of inflammation in joints, FROM in all extremities.     SKIN: no suspicious lesions or rashes     NEURO: Normal strength and tone, sensory exam grossly normal, mentation intact and speech normal     PSYCH: mentation appears normal. and affect normal/bright     LYMPHATICS: No cervical adenopathy    Recent Labs   Lab Test 11/07/23  0931      POTASSIUM 4.3   CR 0.89   A1C 5.2        Diagnostics:  No labs were ordered during this visit.   No EKG required for low risk surgery (cataract, skin procedure, breast biopsy, etc).    Revised Cardiac Risk Index (RCRI):  The patient has the following serious cardiovascular risks for perioperative complications:   - No serious cardiac risks = 0 points     RCRI Interpretation: 0 points: Class I (very low risk - 0.4% complication rate)         ICD-10-CM    1. Pre-op exam  Z01.818       2. Primary osteoarthritis of left ankle  M19.072       3. Snoring  R06.83         -Has not had a previous work-up for sleep apnea. I recommend close monitoring of oxygenation status during surgery. No contraindications to surgery at this time.  -Discussed discontinue Ibuprofen 7 days prior to surgery.  Signed Electronically by: KAYLIE Latham CNP  Copy of this evaluation report is provided to requesting physician.

## 2023-12-14 ENCOUNTER — TELEPHONE (OUTPATIENT)
Dept: ORTHOPEDICS | Facility: CLINIC | Age: 58
End: 2023-12-14
Payer: COMMERCIAL

## 2023-12-14 NOTE — CONFIDENTIAL NOTE
Attempted to return call to patient to check in prior to surgery next week. LVM requesting callback and MyChart message sent.    Tara Holter, RNCC

## 2023-12-18 ENCOUNTER — TRANSFERRED RECORDS (OUTPATIENT)
Dept: HEALTH INFORMATION MANAGEMENT | Facility: CLINIC | Age: 58
End: 2023-12-18
Payer: COMMERCIAL

## 2023-12-20 ENCOUNTER — DOCUMENTATION ONLY (OUTPATIENT)
Dept: ORTHOPEDICS | Facility: CLINIC | Age: 58
End: 2023-12-20
Payer: COMMERCIAL

## 2023-12-20 NOTE — PROGRESS NOTES
Received Completed forms Yes   Faxed Forms Faxed To: [facility]  Fax Number: [phone]   Sent to [facility] (Date) 12/20/23

## 2023-12-21 NOTE — TELEPHONE ENCOUNTER
FUTURE VISIT INFORMATION      SURGERY INFORMATION:  Date: 12/27/23  Location: ur or  Surgeon:  Aman Rincon MD   Anesthesia Type:  choice with block  Procedure: FUSION, JOINT, ANKLE - LEFT     RECORDS REQUESTED FROM:       Primary Care Provider: Bijal Browne APRN [facility]

## 2023-12-22 ENCOUNTER — PRE VISIT (OUTPATIENT)
Dept: SURGERY | Facility: CLINIC | Age: 58
End: 2023-12-22

## 2023-12-26 ENCOUNTER — ANESTHESIA EVENT (OUTPATIENT)
Dept: SURGERY | Facility: CLINIC | Age: 58
End: 2023-12-26
Payer: COMMERCIAL

## 2023-12-27 ENCOUNTER — ANESTHESIA (OUTPATIENT)
Dept: SURGERY | Facility: CLINIC | Age: 58
End: 2023-12-27
Payer: COMMERCIAL

## 2023-12-27 ENCOUNTER — APPOINTMENT (OUTPATIENT)
Dept: GENERAL RADIOLOGY | Facility: CLINIC | Age: 58
End: 2023-12-27
Attending: ORTHOPAEDIC SURGERY
Payer: COMMERCIAL

## 2023-12-27 ENCOUNTER — HOSPITAL ENCOUNTER (OUTPATIENT)
Facility: CLINIC | Age: 58
Discharge: HOME | End: 2023-12-27
Attending: ORTHOPAEDIC SURGERY | Admitting: ORTHOPAEDIC SURGERY
Payer: COMMERCIAL

## 2023-12-27 VITALS
HEIGHT: 72 IN | OXYGEN SATURATION: 95 % | TEMPERATURE: 97.3 F | RESPIRATION RATE: 12 BRPM | SYSTOLIC BLOOD PRESSURE: 129 MMHG | HEART RATE: 66 BPM | DIASTOLIC BLOOD PRESSURE: 85 MMHG | WEIGHT: 293 LBS | BODY MASS INDEX: 39.68 KG/M2

## 2023-12-27 DIAGNOSIS — M25.572 LEFT ANKLE PAIN, UNSPECIFIED CHRONICITY: Primary | ICD-10-CM

## 2023-12-27 PROCEDURE — 250N000011 HC RX IP 250 OP 636: Performed by: NURSE ANESTHETIST, CERTIFIED REGISTERED

## 2023-12-27 PROCEDURE — 250N000009 HC RX 250: Performed by: STUDENT IN AN ORGANIZED HEALTH CARE EDUCATION/TRAINING PROGRAM

## 2023-12-27 PROCEDURE — 250N000011 HC RX IP 250 OP 636: Performed by: STUDENT IN AN ORGANIZED HEALTH CARE EDUCATION/TRAINING PROGRAM

## 2023-12-27 PROCEDURE — 710N000009 HC RECOVERY PHASE 1, LEVEL 1, PER MIN: Performed by: ORTHOPAEDIC SURGERY

## 2023-12-27 PROCEDURE — 250N000011 HC RX IP 250 OP 636: Performed by: PHYSICIAN ASSISTANT

## 2023-12-27 PROCEDURE — 250N000011 HC RX IP 250 OP 636: Performed by: ANESTHESIOLOGY

## 2023-12-27 PROCEDURE — 272N000001 HC OR GENERAL SUPPLY STERILE: Performed by: ORTHOPAEDIC SURGERY

## 2023-12-27 PROCEDURE — 250N000013 HC RX MED GY IP 250 OP 250 PS 637: Performed by: PHYSICIAN ASSISTANT

## 2023-12-27 PROCEDURE — 710N000012 HC RECOVERY PHASE 2, PER MINUTE: Performed by: ORTHOPAEDIC SURGERY

## 2023-12-27 PROCEDURE — 370N000017 HC ANESTHESIA TECHNICAL FEE, PER MIN: Performed by: ORTHOPAEDIC SURGERY

## 2023-12-27 PROCEDURE — 360N000083 HC SURGERY LEVEL 3 W/ FLUORO, PER MIN: Performed by: ORTHOPAEDIC SURGERY

## 2023-12-27 PROCEDURE — C1713 ANCHOR/SCREW BN/BN,TIS/BN: HCPCS | Performed by: ORTHOPAEDIC SURGERY

## 2023-12-27 PROCEDURE — 258N000003 HC RX IP 258 OP 636: Performed by: NURSE ANESTHETIST, CERTIFIED REGISTERED

## 2023-12-27 PROCEDURE — 999N000141 HC STATISTIC PRE-PROCEDURE NURSING ASSESSMENT: Performed by: ORTHOPAEDIC SURGERY

## 2023-12-27 PROCEDURE — 271N000001 HC OR GENERAL SUPPLY NON-STERILE: Performed by: ORTHOPAEDIC SURGERY

## 2023-12-27 PROCEDURE — 999N000180 XR SURGERY CARM FLUORO LESS THAN 5 MIN: Mod: TC

## 2023-12-27 PROCEDURE — 250N000009 HC RX 250: Performed by: NURSE ANESTHETIST, CERTIFIED REGISTERED

## 2023-12-27 DEVICE — IMPLANTABLE DEVICE: Type: IMPLANTABLE DEVICE | Site: ANKLE | Status: FUNCTIONAL

## 2023-12-27 DEVICE — IMP SCREW CANC HEX RECESS 4.0X50X15MM  00-4840-050-01: Type: IMPLANTABLE DEVICE | Site: ANKLE | Status: FUNCTIONAL

## 2023-12-27 RX ORDER — SODIUM CHLORIDE, SODIUM LACTATE, POTASSIUM CHLORIDE, CALCIUM CHLORIDE 600; 310; 30; 20 MG/100ML; MG/100ML; MG/100ML; MG/100ML
INJECTION, SOLUTION INTRAVENOUS CONTINUOUS
Status: DISCONTINUED | OUTPATIENT
Start: 2023-12-27 | End: 2023-12-27 | Stop reason: HOSPADM

## 2023-12-27 RX ORDER — FENTANYL CITRATE 50 UG/ML
50 INJECTION, SOLUTION INTRAMUSCULAR; INTRAVENOUS EVERY 5 MIN PRN
Status: DISCONTINUED | OUTPATIENT
Start: 2023-12-27 | End: 2023-12-27 | Stop reason: HOSPADM

## 2023-12-27 RX ORDER — FENTANYL CITRATE 50 UG/ML
25 INJECTION, SOLUTION INTRAMUSCULAR; INTRAVENOUS EVERY 5 MIN PRN
Status: DISCONTINUED | OUTPATIENT
Start: 2023-12-27 | End: 2023-12-27 | Stop reason: HOSPADM

## 2023-12-27 RX ORDER — ONDANSETRON 2 MG/ML
4 INJECTION INTRAMUSCULAR; INTRAVENOUS EVERY 30 MIN PRN
Status: DISCONTINUED | OUTPATIENT
Start: 2023-12-27 | End: 2023-12-27 | Stop reason: HOSPADM

## 2023-12-27 RX ORDER — SODIUM CHLORIDE, SODIUM LACTATE, POTASSIUM CHLORIDE, CALCIUM CHLORIDE 600; 310; 30; 20 MG/100ML; MG/100ML; MG/100ML; MG/100ML
INJECTION, SOLUTION INTRAVENOUS CONTINUOUS PRN
Status: DISCONTINUED | OUTPATIENT
Start: 2023-12-27 | End: 2023-12-27

## 2023-12-27 RX ORDER — NALOXONE HYDROCHLORIDE 0.4 MG/ML
0.2 INJECTION, SOLUTION INTRAMUSCULAR; INTRAVENOUS; SUBCUTANEOUS
Status: DISCONTINUED | OUTPATIENT
Start: 2023-12-27 | End: 2023-12-27 | Stop reason: HOSPADM

## 2023-12-27 RX ORDER — PROPOFOL 10 MG/ML
INJECTION, EMULSION INTRAVENOUS PRN
Status: DISCONTINUED | OUTPATIENT
Start: 2023-12-27 | End: 2023-12-27

## 2023-12-27 RX ORDER — HYDROCODONE BITARTRATE AND ACETAMINOPHEN 5; 325 MG/1; MG/1
1 TABLET ORAL
Status: COMPLETED | OUTPATIENT
Start: 2023-12-27 | End: 2023-12-27

## 2023-12-27 RX ORDER — FLUMAZENIL 0.1 MG/ML
0.2 INJECTION, SOLUTION INTRAVENOUS
Status: DISCONTINUED | OUTPATIENT
Start: 2023-12-27 | End: 2023-12-27 | Stop reason: HOSPADM

## 2023-12-27 RX ORDER — NALOXONE HYDROCHLORIDE 0.4 MG/ML
0.4 INJECTION, SOLUTION INTRAMUSCULAR; INTRAVENOUS; SUBCUTANEOUS
Status: DISCONTINUED | OUTPATIENT
Start: 2023-12-27 | End: 2023-12-27 | Stop reason: HOSPADM

## 2023-12-27 RX ORDER — FENTANYL CITRATE 50 UG/ML
25-50 INJECTION, SOLUTION INTRAMUSCULAR; INTRAVENOUS
Status: DISCONTINUED | OUTPATIENT
Start: 2023-12-27 | End: 2023-12-27

## 2023-12-27 RX ORDER — ONDANSETRON 4 MG/1
4 TABLET, ORALLY DISINTEGRATING ORAL EVERY 30 MIN PRN
Status: DISCONTINUED | OUTPATIENT
Start: 2023-12-27 | End: 2023-12-27 | Stop reason: HOSPADM

## 2023-12-27 RX ORDER — OXYCODONE HYDROCHLORIDE 10 MG/1
10 TABLET ORAL
Status: DISCONTINUED | OUTPATIENT
Start: 2023-12-27 | End: 2023-12-27 | Stop reason: HOSPADM

## 2023-12-27 RX ORDER — KETOROLAC TROMETHAMINE 30 MG/ML
15 INJECTION, SOLUTION INTRAMUSCULAR; INTRAVENOUS
Status: DISCONTINUED | OUTPATIENT
Start: 2023-12-27 | End: 2023-12-27 | Stop reason: HOSPADM

## 2023-12-27 RX ORDER — CEFAZOLIN SODIUM/WATER 3 G/30 ML
3 SYRINGE (ML) INTRAVENOUS
Status: COMPLETED | OUTPATIENT
Start: 2023-12-27 | End: 2023-12-27

## 2023-12-27 RX ORDER — BUPIVACAINE HYDROCHLORIDE AND EPINEPHRINE 5; 5 MG/ML; UG/ML
INJECTION, SOLUTION PERINEURAL
Status: COMPLETED | OUTPATIENT
Start: 2023-12-27 | End: 2023-12-27

## 2023-12-27 RX ORDER — HYDROMORPHONE HYDROCHLORIDE 1 MG/ML
0.4 INJECTION, SOLUTION INTRAMUSCULAR; INTRAVENOUS; SUBCUTANEOUS EVERY 5 MIN PRN
Status: DISCONTINUED | OUTPATIENT
Start: 2023-12-27 | End: 2023-12-27 | Stop reason: HOSPADM

## 2023-12-27 RX ORDER — LIDOCAINE 40 MG/G
CREAM TOPICAL
Status: DISCONTINUED | OUTPATIENT
Start: 2023-12-27 | End: 2023-12-27 | Stop reason: HOSPADM

## 2023-12-27 RX ORDER — PROPOFOL 10 MG/ML
INJECTION, EMULSION INTRAVENOUS CONTINUOUS PRN
Status: DISCONTINUED | OUTPATIENT
Start: 2023-12-27 | End: 2023-12-27

## 2023-12-27 RX ORDER — ONDANSETRON 2 MG/ML
INJECTION INTRAMUSCULAR; INTRAVENOUS PRN
Status: DISCONTINUED | OUTPATIENT
Start: 2023-12-27 | End: 2023-12-27

## 2023-12-27 RX ORDER — HYDROXYZINE HYDROCHLORIDE 25 MG/1
25 TABLET, FILM COATED ORAL 3 TIMES DAILY PRN
Qty: 20 TABLET | Refills: 0 | Status: SHIPPED | OUTPATIENT
Start: 2023-12-27 | End: 2023-12-29

## 2023-12-27 RX ORDER — LIDOCAINE HYDROCHLORIDE 20 MG/ML
INJECTION, SOLUTION INFILTRATION; PERINEURAL PRN
Status: DISCONTINUED | OUTPATIENT
Start: 2023-12-27 | End: 2023-12-27

## 2023-12-27 RX ORDER — HYDROMORPHONE HYDROCHLORIDE 1 MG/ML
0.2 INJECTION, SOLUTION INTRAMUSCULAR; INTRAVENOUS; SUBCUTANEOUS EVERY 5 MIN PRN
Status: DISCONTINUED | OUTPATIENT
Start: 2023-12-27 | End: 2023-12-27 | Stop reason: HOSPADM

## 2023-12-27 RX ORDER — HYDROXYZINE HYDROCHLORIDE 25 MG/1
25 TABLET, FILM COATED ORAL
Status: DISCONTINUED | OUTPATIENT
Start: 2023-12-27 | End: 2023-12-27 | Stop reason: HOSPADM

## 2023-12-27 RX ORDER — OXYCODONE HYDROCHLORIDE 5 MG/1
5 TABLET ORAL
Status: DISCONTINUED | OUTPATIENT
Start: 2023-12-27 | End: 2023-12-27 | Stop reason: HOSPADM

## 2023-12-27 RX ORDER — HYDROCODONE BITARTRATE AND ACETAMINOPHEN 5; 325 MG/1; MG/1
1-2 TABLET ORAL EVERY 4 HOURS PRN
Qty: 20 TABLET | Refills: 0 | Status: SHIPPED | OUTPATIENT
Start: 2023-12-27 | End: 2023-12-29

## 2023-12-27 RX ORDER — CEFAZOLIN SODIUM/WATER 3 G/30 ML
3 SYRINGE (ML) INTRAVENOUS SEE ADMIN INSTRUCTIONS
Status: DISCONTINUED | OUTPATIENT
Start: 2023-12-27 | End: 2023-12-27 | Stop reason: HOSPADM

## 2023-12-27 RX ORDER — DEXAMETHASONE SODIUM PHOSPHATE 4 MG/ML
INJECTION, SOLUTION INTRA-ARTICULAR; INTRALESIONAL; INTRAMUSCULAR; INTRAVENOUS; SOFT TISSUE PRN
Status: DISCONTINUED | OUTPATIENT
Start: 2023-12-27 | End: 2023-12-27

## 2023-12-27 RX ADMIN — BUPIVACAINE HYDROCHLORIDE AND EPINEPHRINE 10 ML: 5; 5 INJECTION, SOLUTION PERINEURAL at 07:10

## 2023-12-27 RX ADMIN — HYDROMORPHONE HYDROCHLORIDE 0.2 MG: 1 INJECTION, SOLUTION INTRAMUSCULAR; INTRAVENOUS; SUBCUTANEOUS at 09:18

## 2023-12-27 RX ADMIN — SODIUM CHLORIDE, POTASSIUM CHLORIDE, SODIUM LACTATE AND CALCIUM CHLORIDE: 600; 310; 30; 20 INJECTION, SOLUTION INTRAVENOUS at 07:36

## 2023-12-27 RX ADMIN — ONDANSETRON 4 MG: 2 INJECTION INTRAMUSCULAR; INTRAVENOUS at 07:36

## 2023-12-27 RX ADMIN — PROPOFOL 200 MG: 10 INJECTION, EMULSION INTRAVENOUS at 07:36

## 2023-12-27 RX ADMIN — HYDROCODONE BITARTRATE AND ACETAMINOPHEN 1 TABLET: 5; 325 TABLET ORAL at 09:32

## 2023-12-27 RX ADMIN — DEXAMETHASONE SODIUM PHOSPHATE 6 MG: 4 INJECTION, SOLUTION INTRA-ARTICULAR; INTRALESIONAL; INTRAMUSCULAR; INTRAVENOUS; SOFT TISSUE at 07:36

## 2023-12-27 RX ADMIN — MIDAZOLAM HYDROCHLORIDE 1 MG: 1 INJECTION, SOLUTION INTRAMUSCULAR; INTRAVENOUS at 07:12

## 2023-12-27 RX ADMIN — FENTANYL CITRATE 50 MCG: 50 INJECTION INTRAMUSCULAR; INTRAVENOUS at 07:12

## 2023-12-27 RX ADMIN — Medication 3 G: at 07:36

## 2023-12-27 RX ADMIN — LIDOCAINE HYDROCHLORIDE 60 MG: 20 INJECTION, SOLUTION INFILTRATION; PERINEURAL at 07:36

## 2023-12-27 RX ADMIN — FENTANYL CITRATE 25 MCG: 50 INJECTION INTRAMUSCULAR; INTRAVENOUS at 08:59

## 2023-12-27 RX ADMIN — PROPOFOL 30 MG: 10 INJECTION, EMULSION INTRAVENOUS at 07:45

## 2023-12-27 RX ADMIN — BUPIVACAINE HYDROCHLORIDE AND EPINEPHRINE BITARTRATE 20 ML: 5; .005 INJECTION, SOLUTION PERINEURAL at 07:05

## 2023-12-27 RX ADMIN — HYDROMORPHONE HYDROCHLORIDE 0.5 MG: 1 INJECTION, SOLUTION INTRAMUSCULAR; INTRAVENOUS; SUBCUTANEOUS at 08:44

## 2023-12-27 RX ADMIN — FENTANYL CITRATE 25 MCG: 50 INJECTION INTRAMUSCULAR; INTRAVENOUS at 09:07

## 2023-12-27 RX ADMIN — PROPOFOL 20 MG: 10 INJECTION, EMULSION INTRAVENOUS at 08:08

## 2023-12-27 RX ADMIN — PROPOFOL 200 MCG/KG/MIN: 10 INJECTION, EMULSION INTRAVENOUS at 07:36

## 2023-12-27 RX ADMIN — SODIUM CHLORIDE, POTASSIUM CHLORIDE, SODIUM LACTATE AND CALCIUM CHLORIDE: 600; 310; 30; 20 INJECTION, SOLUTION INTRAVENOUS at 08:39

## 2023-12-27 ASSESSMENT — ACTIVITIES OF DAILY LIVING (ADL)
ADLS_ACUITY_SCORE: 35
ADLS_ACUITY_SCORE: 36
ADLS_ACUITY_SCORE: 35

## 2023-12-27 NOTE — OP NOTE
Date of surgery: 12/27/2023      Preoperative diagnosis: Left ankle arthritis     Postoperative diagnosis: Left ankle arthritis     Procedure: Left ankle arthrodesis     Surgeons: Aman Rincon MD     Assistants: Allen Irwin PA-C     Complications: None     Drains: None     EBL: Less than 20 cc     Anesthesia: General endotracheal     Indications: Please refer to clinic note for further details     Procedure note: On 12/27/2023 patient was taken to surgery.  Preoperative antibiotics were administered to the patient prior to arrival to the OR     After successful induction of general endotracheal anesthesia she  was placed supine on the table.  The left lower extremity was prepped and draped in sterile fashion.  After exsanguination by gravity, tourniquet cuff was inflated to 300 mmHg on the proximal third of the left thigh.      The pause for the cause was performed according to our institutional policy which confirmed laterality of the procedure.     An incision was made on the lateral aspect of the ankle joint.  Subcutaneous's were dissected.  It was indication of the lateral malleolus and will proceed with an oblique osteotomy approximately 6 to 7 cm proximal to the joint line.    The most medial half of the lateral malleolus was extracted through the sagittal plane and placed on the back table.  Eventually was the strip from soft tissues.  It was morselized and utilized for future bone graft.    We proceeded with exposure of the ankle joint and resection of the remaining cartilaginous surfaces which was approximately 50% of the total surface.  Bleeding subchondral bone exposed with drill bit.    The ankle joint was reduced and will proceed with placement of a screw from posterior lateral to anteromedial followed by a second screw from anteromedial to posterolateral with excellent purchase for both screws.    The bone graft was placed back into the lateral aspect of the ankle the lateral malleolus was placed back  into its original position and secured with 4.0 millimeter screws x 2 1 across the tibia 1 across the talus.    We confirmed arthroscopic emanation and 3 views of the left ankle to have excellent reduction of the ankle joint and location of the hardware.  This images were sent to PACS for definitive documentation.    Tourniquet was deflated.  Satisfactory hemostasis was accomplished.  Wound was closed in layers.  Sterile dressings were applied.  Patient was placed in short leg cast and transferred in stable condition to PACU.      Plan: Patient will remain nonweightbearing x 3 months.  She will return to clinic in 2 weeks for a wound check at that time sutures were removed indicated and she will place in a second short leg cast in a neutral alignment.    Patient will return to clinic at 6 weeks and surgery at that time 3 views of the left ankle will be obtained.    Patient will not pursue any physical therapy for the first 6 weeks from surgery.    Aman Rincon MD

## 2023-12-27 NOTE — ANESTHESIA POSTPROCEDURE EVALUATION
Patient: Jacqui Grijalva    Procedure: Procedure(s):  FUSION, JOINT, ANKLE - LEFT       Anesthesia Type:  General    Note:  Disposition: Outpatient   Postop Pain Control: Uneventful            Sign Out: Well controlled pain   PONV: No   Neuro/Psych: Uneventful            Sign Out: Acceptable/Baseline neuro status   Airway/Respiratory: Uneventful            Sign Out: Acceptable/Baseline resp. status   CV/Hemodynamics: Uneventful            Sign Out: Acceptable CV status; No obvious hypovolemia; No obvious fluid overload   Other NRE: NONE   DID A NON-ROUTINE EVENT OCCUR? No           Last vitals:  Vitals Value Taken Time   /76 12/27/23 0945   Temp 36.5  C (97.7  F) 12/27/23 0930   Pulse 66 12/27/23 0946   Resp 22 12/27/23 0947   SpO2 99 % 12/27/23 0948   Vitals shown include unfiled device data.    Electronically Signed By: Adria Gregory MD, MD  December 27, 2023  11:24 AM

## 2023-12-27 NOTE — BRIEF OP NOTE
[facility] Brief Operative Note    Pre-operative diagnosis: Primary osteoarthritis of left ankle [M19.072]   Post-operative diagnosis Osteoarthritis of left ankle   Procedure: Procedure(s):  FUSION, JOINT, ANKLE - LEFT   Surgeon(s): Surgeon(s) and Role:     * Aman Rincon MD - Primary     * Romel Irwin PA-C - Assisting   Estimated blood loss: 20 cc   Specimens: * No specimens in log *   Findings: See post op report     Plan:  Same Day surgery discharge to home once criteria met.  Cast to remain on left  lower extremity and  NWB at all times.  Norco for pain.  No dressing change on own.  Leave dressing on until 2 weeks follow up appointment.  F/U in clinic in 2 weeks    I was asked by Dr. Rincon to assist with surgery. I positioned and prepped the patient. I retracted soft tissue.   I suctioned fluids when needed. I provided traction for dissection. I helped to ligate blood vessels. I helped Dr. Rincon identify and protect important structures. The procedure was medically necessary for an assistant because Dr. Rincon needed the operative exposure and assistance that I provided. This allowed him to safely and efficiently operate. It was also important that I help ligate blood vessels to maintain hemostasis and reduce the bleeding risk. I helped with the closure of the operative incisions as well as helping with the boot/cast/splint.  The assistance that I provided reduced operative time which meant less general anesthetic for the patient. No qualified residents were available to assist.    Romel Irwin PA-C

## 2023-12-27 NOTE — ANESTHESIA PROCEDURE NOTES
"Adductor canal Procedure Note    Pre-Procedure   Staff -        Anesthesiologist:  Dwight Stroud MD       Resident/Fellow: Papi Mason MD       Performed By: fellow       Location: pre-op       Procedure Start/Stop Times: 12/27/2023 7:10 AM and 12/27/2023 7:26 AM       Pre-Anesthestic Checklist: patient identified, IV checked, site marked, risks and benefits discussed, informed consent, monitors and equipment checked, pre-op evaluation, at physician/surgeon's request and post-op pain management  Timeout:       Correct Patient: Yes        Correct Procedure: Yes        Correct Site: Yes        Correct Position: Yes        Correct Laterality: Yes        Site Marked: Yes  Procedure Documentation  Procedure: Adductor canal       Diagnosis: POST OPERATIVE PAIN CONTROL       Laterality: left       Patient Position: supine       Patient Prep/Sterile Barriers: sterile gloves, mask       Skin prep: Chloraprep       Needle Type: insulated       Needle Gauge: 21.        Needle Length (Inches): 4        Ultrasound guided       1. Ultrasound was used to identify targeted nerve, plexus, vascular marker, or fascial plane and place a needle adjacent to it in real-time.       2. Ultrasound was used to visualize the spread of anesthetic in close proximity to the above referenced structure.       3. A permanent image is entered into the patient's record.    Assessment/Narrative         The placement was negative for: blood aspirated, painful injection and site bleeding       Paresthesias: No.       Bolus given via needle..        Secured via.        Insertion/Infusion Method: Single Shot       Complications: none    Medication(s) Administered   Bupivacaine 0.5% w/ 1:200K Epi (Other) - Other   10 mL - 12/27/2023 7:10:00 AM  Medication Administration Time: 12/27/2023 7:10 AM      FOR [facility] ([facility] [place]) ONLY:   Pain Team Contact information: please page the Pain Team Via MedWhat. Search \"Pain\". During daytime hours, " please page the attending first. At night please page the resident first.

## 2023-12-27 NOTE — ANESTHESIA PROCEDURE NOTES
"Sciatic Procedure Note    Pre-Procedure   Staff -        Anesthesiologist:  Dwight Stroud MD       Resident/Fellow: Papi Mason MD       Performed By: fellow       Location: pre-op       Procedure Start/Stop Times: 12/27/2023 7:05 AM       Pre-Anesthestic Checklist: patient identified, IV checked, site marked, risks and benefits discussed, informed consent, monitors and equipment checked, pre-op evaluation, at physician/surgeon's request and post-op pain management  Timeout:       Correct Patient: Yes        Correct Procedure: Yes        Correct Site: Yes        Correct Position: Yes        Correct Laterality: Yes        Site Marked: Yes  Procedure Documentation  Procedure: Sciatic       Diagnosis: POST OPERATIVE PAIN CONTROL       Laterality: left       Patient Position: lateral       Patient Prep/Sterile Barriers: sterile gloves, mask       Skin prep: Chloraprep (popliteal approach).       Needle Type: insulated       Needle Gauge: 21.        Needle Length (Inches): 4        Ultrasound guided       1. Ultrasound was used to identify targeted nerve, plexus, vascular marker, or fascial plane and place a needle adjacent to it in real-time.       2. Ultrasound was used to visualize the spread of anesthetic in close proximity to the above referenced structure.       3. A permanent image is entered into the patient's record.    Assessment/Narrative         The placement was negative for: blood aspirated, painful injection and site bleeding       Paresthesias: No.       Bolus given via needle..        Secured via.        Insertion/Infusion Method: Single Shot       Complications: none    Medication(s) Administered   Bupivacaine 0.5% w/ 1:200K Epi (Other) - Other   20 mL - 12/27/2023 7:05:00 AM  Medication Administration Time: 12/27/2023 7:05 AM      FOR [facility] ([facility]) ONLY:   Pain Team Contact information: please page the Pain Team Via SWEEPiO. Search \"Pain\". During daytime hours, please page the " attending first. At night please page the resident first.

## 2023-12-27 NOTE — PROGRESS NOTES
-6248 Pt's disability paperwork given to Dr. Rincon per pt's request  -0701 block started in preop

## 2023-12-27 NOTE — ANESTHESIA PREPROCEDURE EVALUATION
Anesthesia Pre-Procedure Evaluation    Patient: Jacqui Grijalva   MRN: [id number] : 1965        Procedure : Procedure(s):  FUSION, JOINT, ANKLE - LEFT          Past Medical History:   Diagnosis Date    Pain in joint, ankle and foot, left     traumatic injury in childhood    Post-traumatic osteoarthritis of right knee 2016      Past Surgical History:   Procedure Laterality Date    right knee      Meniscus injection    STRIP VEIN Bilateral       Allergies   Allergen Reactions    Pcn [Penicillins]       Social History     Tobacco Use    Smoking status: Never    Smokeless tobacco: Never   Substance Use Topics    Alcohol use: Yes     Comment: 3/week      Wt Readings from Last 1 Encounters:   23 133.3 kg (293 lb 14 oz)        Anesthesia Evaluation   Pt has had prior anesthetic. Type: General.    No history of anesthetic complications       ROS/MED HX  ENT/Pulmonary:     (+)     ANUP risk factors, snores loudly,  obese,                                Neurologic:  - neg neurologic ROS     Cardiovascular:  - neg cardiovascular ROS     METS/Exercise Tolerance: >4 METS    Hematologic:  - neg hematologic  ROS     Musculoskeletal:  - neg musculoskeletal ROS     GI/Hepatic:  - neg GI/hepatic ROS     Renal/Genitourinary:  - neg Renal ROS     Endo:  - neg endo ROS     Psychiatric/Substance Use:  - neg psychiatric ROS     Infectious Disease:  - neg infectious disease ROS     Malignancy:   (+) Malignancy, History of Skin.    Other:  - neg other ROS          Physical Exam    Airway  airway exam normal           Respiratory Devices and Support         Dental       (+) Minor Abnormalities - some fillings, tiny chips      Cardiovascular   cardiovascular exam normal          Pulmonary   pulmonary exam normal                OUTSIDE LABS:  CBC:   Lab Results   Component Value Date    WBC 5.8 2020    WBC 7.9 2020    HGB 12.6 2020    HGB 12.8 2020    HCT 40.7 2020    HCT 39.1 2020    PLT  "229 02/28/2020     02/27/2020     BMP:   Lab Results   Component Value Date     11/07/2023     02/28/2020    POTASSIUM 4.3 11/07/2023    POTASSIUM 4.0 02/28/2020    CHLORIDE 106 11/07/2023    CHLORIDE 104 02/28/2020    CO2 27 11/07/2023    CO2 28 02/28/2020    BUN 9.6 11/07/2023    BUN 9 02/28/2020    CR 0.89 11/07/2023    CR 0.87 02/28/2020    GLC 95 11/07/2023     (H) 02/28/2020     COAGS: No results found for: \"PTT\", \"INR\", \"FIBR\"  POC: No results found for: \"BGM\", \"HCG\", \"HCGS\"  HEPATIC:   Lab Results   Component Value Date    ALBUMIN 4.1 11/07/2023    PROTTOTAL 6.7 11/07/2023    ALT 18 11/07/2023    AST 17 11/07/2023    ALKPHOS 67 11/07/2023    BILITOTAL 0.3 11/07/2023     OTHER:   Lab Results   Component Value Date    A1C 5.2 11/07/2023    HARMAN 9.0 11/07/2023    TSH 1.10 11/07/2023       Anesthesia Plan    ASA Status:  2    NPO Status:  NPO Appropriate    Anesthesia Type: General.     - Airway: LMA   Induction: Intravenous.   Maintenance: TIVA.        Consents    Anesthesia Plan(s) and associated risks, benefits, and realistic alternatives discussed. Questions answered and patient/representative(s) expressed understanding.     - Discussed: Risks, Benefits and Alternatives for BOTH SEDATION and the PROCEDURE were discussed     - Discussed with:  Patient            Postoperative Care    Pain management: Oral pain medications, Peripheral nerve block (Single Shot), Multi-modal analgesia.   PONV prophylaxis: Ondansetron (or other 5HT-3), Dexamethasone or Solumedrol     Comments:               Adria Gregory MD, MD    I have reviewed the pertinent notes and labs in the chart from the past 30 days and (re)examined the patient.  Any updates or changes from those notes are reflected in this note.              # Obesity: Estimated body mass index is 39.86 kg/m  as calculated from the following:    Height as of this encounter: 1.829 m (6').    Weight as of this encounter: 133.3 kg (293 lb 14 " oz).

## 2023-12-27 NOTE — ANESTHESIA CARE TRANSFER NOTE
Patient: Jacqui Grijalva    Procedure: Procedure(s):  FUSION, JOINT, ANKLE - LEFT       Diagnosis: Primary osteoarthritis of left ankle [M19.072]  Diagnosis Additional Information: No value filed.    Anesthesia Type:   General     Note:    Oropharynx: oropharynx clear of all foreign objects and spontaneously breathing  Level of Consciousness: awake  Oxygen Supplementation: face mask  Level of Supplemental Oxygen (L/min / FiO2): 8  Independent Airway: airway patency satisfactory and stable  Dentition: dentition unchanged  Vital Signs Stable: post-procedure vital signs reviewed and stable  Report to RN Given: handoff report given  Patient transferred to: PACU    Handoff Report: Identifed the Patient, Identified the Reponsible Provider, Reviewed the pertinent medical history, Discussed the surgical course, Reviewed Intra-OP anesthesia mangement and issues during anesthesia, Set expectations for post-procedure period and Allowed opportunity for questions and acknowledgement of understanding      Vitals:  Vitals Value Taken Time   /86 12/27/23 0848   Temp 36    Pulse 83 12/27/23 0851   Resp 10 12/27/23 0851   SpO2 98 % 12/27/23 0851   Vitals shown include unfiled device data.    Electronically Signed By: GERTRUDE SPRING APRN CRNA  December 27, 2023  8:52 AM

## 2023-12-27 NOTE — DISCHARGE INSTRUCTIONS

## 2023-12-27 NOTE — ANESTHESIA PROCEDURE NOTES
Airway       Patient location during procedure: OR  Staff -        CRNA: Martha Summers APRN CRNA       Performed By: CRNA  Consent for Airway        Urgency: elective  Indications and Patient Condition       Indications for airway management: kesha-procedural       Induction type:intravenous       Mask difficulty assessment: 0 - not attempted    Final Airway Details       Final airway type: supraglottic airway    Supraglottic Airway Details        Type: LMA       Brand: Air-Q       LMA size: 4    Post intubation assessment        Placement verified by: capnometry, equal breath sounds and chest rise        Number of attempts at approach: 1       Secured with: tape       Ease of procedure: easy       Dentition: Intact and Unchanged

## 2023-12-28 ENCOUNTER — TELEPHONE (OUTPATIENT)
Dept: ORTHOPEDICS | Facility: CLINIC | Age: 58
End: 2023-12-28
Payer: COMMERCIAL

## 2023-12-28 ENCOUNTER — OFFICE VISIT (OUTPATIENT)
Dept: ORTHOPEDICS | Facility: CLINIC | Age: 58
End: 2023-12-28
Payer: COMMERCIAL

## 2023-12-28 DIAGNOSIS — Z98.890 STATUS POST FOOT SURGERY: Primary | ICD-10-CM

## 2023-12-28 PROCEDURE — 99024 POSTOP FOLLOW-UP VISIT: CPT

## 2023-12-28 PROCEDURE — 29405 APPL SHORT LEG CAST: CPT | Mod: 58

## 2023-12-28 NOTE — PROGRESS NOTES
Cast removal:    Relevant Diagnosis: Left ankle fusion    Patient educated on cast removal process: Yes     Short leg cast was removed per physician instruction.    Skin was observed and found to be intact with no signs of concern:Yes     Concern noted: Post op bandages heavily soaked with blood. Very little active bleeding noted upon removal.     Person(s) involved in removal:   Mother     Questions asked: None    Patient sent to x-ray: NA  Cast/splint application    Date/Time: 12/28/2023 1:37 PM    Performed by: Jocelyn Bunn ATC  Authorized by: Aman Rincon MD    Consent:     Consent obtained:  Verbal    Consent given by:  Patient  Pre-procedure details:     Sensation:  Normal  Procedure details:     Laterality:  Left    Location:  Ankle    Ankle:  L ankle    Cast type:  Short leg    Supplies:  Fiberglass  Post-procedure details:     Pain:  Unchanged    Sensation:  Normal    Patient tolerance of procedure:  Tolerated well, no immediate complications    Patient provided with cast or splint care instructions: Yes    Comments:      Bleeding through post op cast. New sterile dressings applied to incisions. Well bandaged and well padded. New cast applied.

## 2023-12-28 NOTE — CONFIDENTIAL NOTE
Returned call to patient. She reports to have a quarter sized spot of blood the heel of her cast. Appointment scheduled for cast change today at 1:00 PM. Confirmed clinic location.    Tara Holter, RNCC

## 2023-12-28 NOTE — TELEPHONE ENCOUNTER
[facility] Call Center    Phone Message    May a detailed message be left on voicemail: yes     Reason for Call: Pt is having post operative bleeding since getting home from surgery.      Action Taken: Other: [facility]     Travel Screening: Not Applicable

## 2023-12-29 ENCOUNTER — TELEPHONE (OUTPATIENT)
Dept: ORTHOPEDICS | Facility: CLINIC | Age: 58
End: 2023-12-29
Payer: COMMERCIAL

## 2023-12-29 NOTE — CONFIDENTIAL NOTE
Returned call to patient. She reports to be doing well after her cast change yesterday, she does not notice any bleeding. She is requesting a refill of Hydroxyzine and Norco before the long weekend. See [facility] refill for documentation.     Tara Holter, JEROMYCC

## 2023-12-29 NOTE — TELEPHONE ENCOUNTER
Medication Question or Refill        What medication are you calling about (include dose and sig)?: HYDROcodone-acetaminophen (NORCO) 5-325 MG     Preferred Pharmacy:   [id number] [address]  [address] [facility] [address]  Phone: [phone] Fax: [phone]      Controlled Substance Agreement on file:   CSA -- Patient Level:    CSA: None found at the patient level.       Who prescribed the medication?: Sergey, however pt doesn't need RX to be filled until Tuesday and is wanting to know if she needed to be seen prior to med refill requesting callback     Do you need a refill? Yes    When did you use the medication last? n\a     Patient offered an appointment? No    Do you have any questions or concerns?  No      Could we send this information to you in [facility] or would you prefer to receive a phone call?:   Patient would prefer a phone call   Okay to leave a detailed message?: Yes at Cell number on file:    Telephone Information:   Mobile [phone]

## 2024-01-05 ENCOUNTER — DOCUMENTATION ONLY (OUTPATIENT)
Dept: ORTHOPEDICS | Facility: CLINIC | Age: 59
End: 2024-01-05
Payer: COMMERCIAL

## 2024-01-05 NOTE — PROGRESS NOTES
Received Completed forms Yes   Faxed Forms Faxed To: [facility]  Fax Number: [phone]   Sent to HIM (Date) 1/4/24

## 2024-01-11 ENCOUNTER — OFFICE VISIT (OUTPATIENT)
Dept: ORTHOPEDICS | Facility: CLINIC | Age: 59
End: 2024-01-11
Payer: COMMERCIAL

## 2024-01-11 DIAGNOSIS — Z98.890 STATUS POST FOOT SURGERY: Primary | ICD-10-CM

## 2024-01-11 PROCEDURE — 29405 APPL SHORT LEG CAST: CPT | Mod: 58

## 2024-01-11 PROCEDURE — 99024 POSTOP FOLLOW-UP VISIT: CPT

## 2024-01-11 NOTE — PROGRESS NOTES
Reason for visit:    Jacqui Grijalva came in to the clinic for a two week post op check.    Her surgery was done 12/27/2023 by Dr Rincon.  She had a  Left ankle arthrodesis.    Assessment:    Jacqui came into the clinic in a short leg cast Non-WB, accompanied by her daughter.    The Surgical wounds were exposed and found to be well-healed; so the sutures were removed. Skin was c/d/I. Steri strips were applied. Moderate swelling in foot. Patient encouraged to continue elevating often.     Plan:     She was placed in a new short leg cast.  She was told to remain Non-WB.     She has an appointment to see Dr. Rincon at 6 weeks post op and at that time Dr. Rincon will determine further restrictions.    All questions were answered. She has our phone number and will call with additional questions or problems.    Jocelyn Castellon is the overseeing provider on site today.    Cast removal:    Relevant Diagnosis: Left ankle fusion    Patient educated on cast removal process: Yes     Short leg cast was removed per physician instruction.    Skin was observed and found to be intact with no signs of concern:Yes     Concern noted: None     Person(s) involved in removal:   Daughter     Questions asked: None    Patient sent to x-ray: NA  Cast/splint application    Date/Time: 1/11/2024 11:57 AM    Performed by: Jocelyn Bunn ATC  Authorized by: Aman Ricnon MD    Consent:     Consent obtained:  Verbal    Consent given by:  Patient  Pre-procedure details:     Sensation:  Normal  Procedure details:     Laterality:  Left    Location:  Ankle    Cast type:  Short leg    Supplies:  Fiberglass  Post-procedure details:     Pain:  Unchanged    Sensation:  Normal    Patient tolerance of procedure:  Tolerated well, no immediate complications    Patient provided with cast or splint care instructions: Yes

## 2024-02-02 DIAGNOSIS — Z98.890 STATUS POST FOOT SURGERY: Primary | ICD-10-CM

## 2024-02-06 ENCOUNTER — OFFICE VISIT (OUTPATIENT)
Dept: ORTHOPEDICS | Facility: CLINIC | Age: 59
End: 2024-02-06
Payer: COMMERCIAL

## 2024-02-06 ENCOUNTER — ANCILLARY PROCEDURE (OUTPATIENT)
Dept: GENERAL RADIOLOGY | Facility: CLINIC | Age: 59
End: 2024-02-06
Attending: ORTHOPAEDIC SURGERY
Payer: COMMERCIAL

## 2024-02-06 DIAGNOSIS — Z98.890 STATUS POST FOOT SURGERY: Primary | ICD-10-CM

## 2024-02-06 DIAGNOSIS — M25.572 PAIN IN JOINT, ANKLE AND FOOT, LEFT: ICD-10-CM

## 2024-02-06 DIAGNOSIS — Z98.890 STATUS POST FOOT SURGERY: ICD-10-CM

## 2024-02-06 PROCEDURE — 29405 APPL SHORT LEG CAST: CPT | Mod: 58 | Performed by: ORTHOPAEDIC SURGERY

## 2024-02-06 PROCEDURE — 73610 X-RAY EXAM OF ANKLE: CPT | Mod: LT | Performed by: RADIOLOGY

## 2024-02-06 PROCEDURE — 99024 POSTOP FOLLOW-UP VISIT: CPT | Performed by: ORTHOPAEDIC SURGERY

## 2024-02-06 RX ORDER — HYDROXYZINE HYDROCHLORIDE 25 MG/1
25 TABLET, FILM COATED ORAL 3 TIMES DAILY PRN
Qty: 30 TABLET | Refills: 0 | Status: SHIPPED | OUTPATIENT
Start: 2024-02-06 | End: 2024-05-30

## 2024-02-06 NOTE — LETTER
2/6/2024       RE: Jacqui Grijalva  [address]  [place] [address]    Dear Colleague,    Thank you for referring your patient, Jacqui Grijalva, to the [facility] [place]. Please see a copy of my visit note below.    Chief complaint: Status post left ankle fusion performed December 27, 2023    Patient presents today for further follow-up.  Reports to be doing well.  Reports to be compliant.    On today's visit she presents with well-healed surgical incisions there is some diffuse swelling there is excellent alignment of the left ankle.  There is no signs of infection formation forefoot exam is unremarkable    Plain x-rays of the left ankle were obtained today which are significant for joint excellent consolidation across the fusion site.  Hardware is intact and in place alignment is excellent    Assessment: Status post left ankle fusion    Plan: I discussed with the patient that at this point she needs to remain nonweightbearing for another month.  Patient chose to place herself in a short leg cast instead of the cam walker    She will remain nonweightbearing for 4 weeks and she will return to clinic in a month from now and at that time a CT scan will be obtained prior to the appointment therefore we will not need plain x-rays during the next follow-up appointment    All questions were answered.  Patient was pleased with discussion.    Again, thank you for allowing me to participate in the care of your patient.      Aman Rincon MD

## 2024-02-06 NOTE — NURSING NOTE
Reason For Visit:   Chief Complaint   Patient presents with    Surgical Followup     6 wk post-op left ankle fusion DOS 12/27/23       58 year old  1965    Primary MD: Bijal Browne 08/01/2016 (Approximate)     Pain Assessment  Patient Currently in Pain: Yes  0-10 Pain Scale: 1  Primary Pain Location: Ankle (left)      Osito Mayo, ATC

## 2024-02-07 NOTE — PROGRESS NOTES
Chief complaint: Status post left ankle fusion performed December 27, 2023    Patient presents today for further follow-up.  Reports to be doing well.  Reports to be compliant.    On today's visit she presents with well-healed surgical incisions there is some diffuse swelling there is excellent alignment of the left ankle.  There is no signs of infection formation forefoot exam is unremarkable    Plain x-rays of the left ankle were obtained today which are significant for joint excellent consolidation across the fusion site.  Hardware is intact and in place alignment is excellent    Assessment: Status post left ankle fusion    Plan: I discussed with the patient that at this point she needs to remain nonweightbearing for another month.  Patient chose to place herself in a short leg cast instead of the cam walker    She will remain nonweightbearing for 4 weeks and she will return to clinic in a month from now and at that time a CT scan will be obtained prior to the appointment therefore we will not need plain x-rays during the next follow-up appointment    All questions were answered.  Patient was pleased with discussion.

## 2024-02-07 NOTE — PROGRESS NOTES
Cast/splint application    Date/Time: 2/6/2024 4:18 PM    Performed by: Jocelyn Bunn ATC  Authorized by: Aman Rincon MD    Consent:     Consent obtained:  Verbal    Consent given by:  Patient  Pre-procedure details:     Sensation:  Normal  Procedure details:     Laterality:  Left    Location:  Ankle    Ankle:  L ankle    Cast type:  Short leg    Supplies:  Fiberglass  Post-procedure details:     Pain:  Unchanged    Sensation:  Normal    Patient tolerance of procedure:  Tolerated well, no immediate complications    Patient provided with cast or splint care instructions: Yes

## 2024-02-16 ENCOUNTER — TELEPHONE (OUTPATIENT)
Dept: ORTHOPEDICS | Facility: CLINIC | Age: 59
End: 2024-02-16
Payer: COMMERCIAL

## 2024-02-16 DIAGNOSIS — M19.072 PRIMARY OSTEOARTHRITIS OF LEFT ANKLE: ICD-10-CM

## 2024-02-16 DIAGNOSIS — Z98.890 STATUS POST FOOT SURGERY: Primary | ICD-10-CM

## 2024-02-19 ENCOUNTER — TELEPHONE (OUTPATIENT)
Dept: ORTHOPEDICS | Facility: CLINIC | Age: 59
End: 2024-02-19
Payer: COMMERCIAL

## 2024-02-19 NOTE — TELEPHONE ENCOUNTER
Left Voicemail (2nd Attempt) and Sent Mychart (2nd Attempt) for the patient to call back and schedule the following:    Appointment type: CT scan  Provider: Dr. Rincon  Return date: before 3/12 Dr. Rincon appt

## 2024-02-21 ENCOUNTER — TELEPHONE (OUTPATIENT)
Dept: GASTROENTEROLOGY | Facility: CLINIC | Age: 59
End: 2024-02-21
Payer: COMMERCIAL

## 2024-02-21 ENCOUNTER — HOSPITAL ENCOUNTER (OUTPATIENT)
Facility: CLINIC | Age: 59
End: 2024-02-21
Attending: STUDENT IN AN ORGANIZED HEALTH CARE EDUCATION/TRAINING PROGRAM | Admitting: STUDENT IN AN ORGANIZED HEALTH CARE EDUCATION/TRAINING PROGRAM
Payer: COMMERCIAL

## 2024-02-21 DIAGNOSIS — Z12.11 SPECIAL SCREENING FOR MALIGNANT NEOPLASMS, COLON: Primary | ICD-10-CM

## 2024-02-21 NOTE — TELEPHONE ENCOUNTER
"Endoscopy Scheduling Screen    Have you had a positive Covid test in the last 14 days?  No    Are you active on MyChart?   Yes    What insurance is in the chart?  Other:  R     Ordering/Referring Provider:     PAUL JACKSON       (If ordering provider performs procedure, schedule with ordering provider unless otherwise instructed. )    BMI: Estimated body mass index is 39.86 kg/m  as calculated from the following:    Height as of 12/27/23: 1.829 m (6').    Weight as of 12/27/23: 133.3 kg (293 lb 14 oz).     Sedation Ordered  moderate sedation.   If patient BMI > 50 do not schedule in [facility].    If patient BMI > 45 do not schedule at [facility].    Are you taking methadone or Suboxone?  No    Have you had difficulties, pain, or discomfort during past endoscopy procedures?  No    Are you taking any prescription medications for pain 3 or more times per week?   NO - No RN review required.    Do you have a history of malignant hyperthermia or adverse reaction to anesthesia?  No    (Females) Are you currently pregnant?   No     Have you been diagnosed or told you have pulmonary hypertension?   No    Do you have an LVAD?  No    Have you been told you have moderate to severe sleep apnea?  No    Have you been told you have COPD, asthma, or any other lung disease?  No    Do you have any heart conditions?  No     Have you ever had an organ transplant?   No    Have you ever had or are you awaiting a heart or lung transplant?   No    Have you had a stroke or transient ischemic attack (TIA aka \"mini stroke\" in the last 6 months?   No    Have you been diagnosed with or been told you have cirrhosis of the liver?   No    Are you currently on dialysis?   No    Do you need assistance transferring?   No    BMI: Estimated body mass index is 39.86 kg/m  as calculated from the following:    Height as of 12/27/23: 1.829 m (6').    Weight as of 12/27/23: 133.3 kg (293 lb 14 oz).     Is patients BMI > 40 and scheduling location [facility]?  No    Do " you take an injectable medication for weight loss or diabetes (excluding insulin)?  No    Do you take the medication Naltrexone?  No    Do you take blood thinners?  No       Prep   Are you currently on dialysis or do you have chronic kidney disease?  No    Do you have a diagnosis of diabetes?  No    Do you have a diagnosis of cystic fibrosis (CF)?  No    On a regular basis do you go 3 -5 days between bowel movements?  No    BMI > 40?  No    Preferred Pharmacy:    [id number] [address]  [address] [place] [address]  Phone: [phone] Fax: [phone]      Final Scheduling Details     Procedure scheduled  Colonoscopy    Surgeon:  СВЕТЛАНА      Date of procedure:  04/12/2024     Pre-OP / PAC:   No - Not required for this site.    Location  [facility] - Per order.    Sedation   Moderate Sedation - Per order.      Patient Reminders:   You will receive a call from a Nurse to review instructions and health history.  This assessment must be completed prior to your procedure.  Failure to complete the Nurse assessment may result in the procedure being cancelled.      On the day of your procedure, please designate an adult(s) who can drive you home stay with you for the next 24 hours. The medicines used in the exam will make you sleepy. You will not be able to drive.      You cannot take public transportation, ride share services, or non-medical taxi service without a responsible caregiver.  Medical transport services are allowed with the requirement that a responsible caregiver will receive you at your destination.  We require that drivers and caregivers are confirmed prior to your procedure.

## 2024-02-28 ENCOUNTER — ANCILLARY PROCEDURE (OUTPATIENT)
Dept: CT IMAGING | Facility: CLINIC | Age: 59
End: 2024-02-28
Attending: ORTHOPAEDIC SURGERY
Payer: COMMERCIAL

## 2024-02-28 DIAGNOSIS — Z98.890 STATUS POST FOOT SURGERY: ICD-10-CM

## 2024-02-28 DIAGNOSIS — M19.072 PRIMARY OSTEOARTHRITIS OF LEFT ANKLE: ICD-10-CM

## 2024-02-28 PROCEDURE — 73700 CT LOWER EXTREMITY W/O DYE: CPT | Mod: LT | Performed by: RADIOLOGY

## 2024-02-29 ENCOUNTER — OFFICE VISIT (OUTPATIENT)
Dept: DERMATOLOGY | Facility: CLINIC | Age: 59
End: 2024-02-29
Payer: COMMERCIAL

## 2024-02-29 DIAGNOSIS — C44.719 BASAL CELL CARCINOMA (BCC) OF LEFT THIGH: ICD-10-CM

## 2024-02-29 DIAGNOSIS — D48.5 NEOPLASM OF UNCERTAIN BEHAVIOR OF SKIN OF CHEST: Primary | ICD-10-CM

## 2024-02-29 DIAGNOSIS — Z85.828 HISTORY OF NONMELANOMA SKIN CANCER: ICD-10-CM

## 2024-02-29 DIAGNOSIS — Z85.820 HISTORY OF MELANOMA: ICD-10-CM

## 2024-02-29 DIAGNOSIS — Q82.8 POROKERATOSIS: ICD-10-CM

## 2024-02-29 PROCEDURE — 88305 TISSUE EXAM BY PATHOLOGIST: CPT | Mod: TC | Performed by: DERMATOLOGY

## 2024-02-29 PROCEDURE — 11102 TANGNTL BX SKIN SINGLE LES: CPT | Mod: 79 | Performed by: DERMATOLOGY

## 2024-02-29 PROCEDURE — 99203 OFFICE O/P NEW LOW 30 MIN: CPT | Mod: 24 | Performed by: DERMATOLOGY

## 2024-02-29 ASSESSMENT — PAIN SCALES - GENERAL: PAINLEVEL: NO PAIN (0)

## 2024-02-29 NOTE — PATIENT INSTRUCTIONS
Skin Biopsy    Biopsy - How to take care of the site?  Keep the biopsy site dry and covered for 24 hours.   After 24 hours you may remove the bandage and clean the site (in the bathtub or shower)   If any discomfort occurs after the local anesthetic wears off, acetaminophen (i.e. Tylenol) may be given.  Apply the vaseline at least once a day with a cotton swab or a clean finger, and keep the site covered with a bandage.   If you are unable to cover the site with a bandage, re-apply ointment 2-3 times a day to keep the site moist. We do NOT want crusting of the site. Moisture will help with healing.  The best time to do wound care is after a shower or bath. You may shower or bathe the day after the biopsy and you can get the site wet. However, keep the force of the water off the biopsy site. Do not soak the area in water.  Change the bandage if it gets wet or sweaty.   A small scab will form and fall off by itself when the area is completely healed. The area will be red, and will become pink in color as it heals. Sun protection is very important for how your scar will heal. Either cover the scar from the sun or wear sunscreen SPF 30 or greater.   AVOID lake swimming until the sutures are removed if you have stiches.   You may swim in a chlorinated pool after your sutures have been in for 5 days. Try to use an occlusive bandage but if not, remove the bandage immediately after swimming and clean the site with a gentle cleanser and redress the site.     If a small amount of bleeding is noticed, place a clean cloth over the area and apply constant firm pressure for 15 minutes-- no peeking! Should the bleeding become heavier or not stop, call the clinic at [phone] or call [phone] to have the Dermatology Resident On-Call paged if after clinic hours, holiday or weekend.    Call us if have any of the following:  Thick, yellow or pus-like wound drainage (clear, or slightly yellow drainage is ok)  Fevers greater  "than 100 degrees Fahrenheit  Spreading redness or warmth at the biopsy site     The biopsy results can take 2-3 weeks to come back. The clinic will call you with the results unless you have a scheduled follow up appointment, then the results will be discussed at that time.           What is a skin biopsy and the difference between the two?  A skin biopsy allows the doctor to examine a very small piece of tissue under the microscope to determine the most appropriate diagnosis and the best treatment for the skin condition. A local anesthetic, similar to the kind that your dentist uses when they fill a cavity, is injected with a very small needle into the skin area to be tested. The skin and tissue underneath is now, \"asleep\" or numb and no pain is felt.     Shave Skin Biopsy:  This is a more superficial type of test, like a deep  scrape  in the skin.  It does not require a stitch.  "

## 2024-02-29 NOTE — LETTER
2/29/2024       RE: Jacqui Grijalva  [address]  [place] [address]     Dear Colleague,    Thank you for referring your patient, Jacqui Grijalva, to the [facility]ERMATOLOG[facility] [place] at [facility]. Please see a copy of my visit note below.    [facility] Dermatology Note  Encounter Date: Feb 29, 2024  Office Visit     Dermatology Problem List:  Melanoma    - Left lower leg (Excised Fall 2023 at [facility])  SCC   - R lower leg (Excised Fall 2023 at [facility])  BCC   - L cheek (Excised Fall 2023 at [facility])  - L upper thigh (biopsied Fall 2023 at [facility], not yet treated)   - R forearm (Excised ~2010)  - Upper chest (Excised ~2010)  Atypical Nevus   - L inferior axilla (Excised Fall 2023 at [facility])   NUB  - Right chest, biopsied 2/29/24  - Ddx: ISK vs Lichenoid Keratosis vs BCC   Porokeratosis  - Reassured benign.  ____________________________________________    Assessment & Plan:    # NUB, right chest  Ddx: Lichenoid keratosis vs BCC  Will biopsy today given concerning features and patients history of NMSC. Reviewed post care with the patient.     # History of Melanoma, L lower leg  Unable to recheck previous scar site due to boot on leg. Will recheck at next visit. Full body skin check in 4 months.   - Sun protection: Counseled SPF30+ sunscreen, UPF clothing, sun avoidance, tanning bed avoidance.  - Continue regular skin checks every six months.     # Basal Cell Carcinoma, L upper leg  Biopsied at [facility]. Site appears to be healing well, photo taken today. Will place referral for excision.     #  Porokeratosis, L hand   Discussed with patient that this is a benign lesion. No further treatment is necessary.    Procedures Performed:   - Shave biopsy procedure note, location(s): right chest. After discussion of benefits  and risks including but not limited to bleeding, infection, scar, incomplete removal, recurrence, and non-diagnostic biopsy, written consent and photographs were obtained. The area was cleaned with isopropyl alcohol. 0.5mL of 1% lidocaine with epinephrine was injected to obtain adequate anesthesia of lesion(s). Shave biopsy at site(s) performed. Hemostasis was achieved with aluminium chloride. Petrolatum ointment and a sterile dressing were applied. The patient tolerated the procedure and no complications were noted. The patient was provided with verbal and written post care instructions.   - Procedure(s) performed by faculty.     Follow-up with dermatology surgery for BCC excision, follow up in 4 months for FBSE given history of melanoma.    Staff and Medical Student:     The patient was seen and staffed with Dr. Fabrice Valera.    Regina Messer, MS3    I was present with the medical student who participated in the service and in the documentation.  I have verified the history and personally performed the physical exam and medical decision making.  I agree with the assessment and plan of care as documented in the note.  I personally performed all procedures.    Fabrice Valera MD  Dermatology Attending    ____________________________________________    CC: Skin Check (Here today for a skin check. 5 spots of concerns. )    HPI:  Ms. Jacqui Grijalva is a(n) 58 year old female who presents today as a new patient for FBSE. Her last FBSE was in December at [facility]. She is hoping to establish care back at the [facility].    Since her last visit here in 2019, she has a number of updates to her skin history from [facility]:  Melanoma, Left lower leg (Excised Fall 2023)  SCC, R lower leg (Excised Fall 2023)  BCC, L cheek (Excised Fall 2023), L upper thigh (biopsied Fall 2023,not yet treated)   Atypical Nevus, L inferior axilla (Excised Fall 2023)     Family history of skin cancers:  father had spots removed but per patient, he was a poor historian so type is unknown. Wears sunscreen regularly. History of tanning bed exposure (~100).     Patient is otherwise feeling well, without additional skin concerns.    Labs:  None reviewed.    Physical Exam:  Vitals: LMP 08/01/2016 (Approximate)   SKIN: Full skin, which includes the head/face, both arms, chest, back, abdomen,both legs, genitalia and/or groin buttocks, digits and/or nails, was examined.  - There are well-healing scars from previous excisions on the right lower leg, right arm, left cheek, right chest. Unable to recheck the scar on the left leg due to cast from ankle fusion.   - There are dome shaped bright red papules on the chest.   - Multiple regular brown pigmented macules and papules are identified on the trunk and extremities.   - Scattered brown macules on sun exposed areas.  - There are waxy stuck on tan to brown papule on the trunk.   - Waxy stuck on papule with annular scale on the left hand.  - Pink, pearly papule on the right chest.    - No other lesions of concern on areas examined.                 Medications:  Current Outpatient Medications   Medication    HYDROcodone-acetaminophen (NORCO) 5-325 MG tablet    hydrOXYzine HCl (ATARAX) 25 MG tablet    hydrOXYzine HCl (ATARAX) 25 MG tablet    IBUPROFEN PO    multivitamin w/minerals (THERA-VIT-M) tablet    omeprazole (PRILOSEC) 40 MG DR capsule    valACYclovir (VALTREX) 1000 mg tablet     No current facility-administered medications for this visit.      Past Medical History:   Patient Active Problem List   Diagnosis    Tear film insufficiency    Chronic allergic conjunctivitis - Both Eyes    Post-traumatic osteoarthritis of right knee    Ankle pain, left    Adjustment disorder with anxious mood    Melanoma of skin (H)     Past Medical History:   Diagnosis Date    Pain in joint, ankle and foot, left     traumatic injury in childhood    Post-traumatic osteoarthritis of right knee  07/11/2016        CC Referred Self, MD  No address on file on close of this encounter.

## 2024-02-29 NOTE — NURSING NOTE
Dermatology Rooming Note    Jacqui Grijalva's goals for this visit include:   Chief Complaint   Patient presents with    Skin Check     Here today for a skin check. 5 spots of concerns.      Jennifer Hood RN

## 2024-02-29 NOTE — PROGRESS NOTES
[facility] Dermatology Note  Encounter Date: Feb 29, 2024  Office Visit     Dermatology Problem List:  Melanoma    - Left lower leg (Excised Fall 2023 at [facility])  SCC   - R lower leg (Excised Fall 2023 at [facility])  BCC   - L cheek (Excised Fall 2023 at [facility])  - L upper thigh (biopsied Fall 2023 at [facility], not yet treated)   - R forearm (Excised ~2010)  - Upper chest (Excised ~2010)  Atypical Nevus   - L inferior axilla (Excised Fall 2023 at [facility])   NUB  - Right chest, biopsied 2/29/24  - Ddx: ISK vs Lichenoid Keratosis vs BCC   Porokeratosis  - Reassured benign.  ____________________________________________    Assessment & Plan:    # NUB, right chest  Ddx: Lichenoid keratosis vs BCC  Will biopsy today given concerning features and patients history of NMSC. Reviewed post care with the patient.     # History of Melanoma, L lower leg  Unable to recheck previous scar site due to boot on leg. Will recheck at next visit. Full body skin check in 4 months.   - Sun protection: Counseled SPF30+ sunscreen, UPF clothing, sun avoidance, tanning bed avoidance.  - Continue regular skin checks every six months.     # Basal Cell Carcinoma, L upper leg  Biopsied at [facility]. Site appears to be healing well, photo taken today. Will place referral for excision.     #  Porokeratosis, L hand   Discussed with patient that this is a benign lesion. No further treatment is necessary.    Procedures Performed:   - Shave biopsy procedure note, location(s): right chest. After discussion of benefits and risks including but not limited to bleeding, infection, scar, incomplete removal, recurrence, and non-diagnostic biopsy, written consent and photographs were obtained. The area was cleaned with isopropyl alcohol. 0.5mL of 1% lidocaine with epinephrine was injected to obtain adequate anesthesia of lesion(s). Shave biopsy at  site(s) performed. Hemostasis was achieved with aluminium chloride. Petrolatum ointment and a sterile dressing were applied. The patient tolerated the procedure and no complications were noted. The patient was provided with verbal and written post care instructions.   - Procedure(s) performed by faculty.     Follow-up with dermatology surgery for BCC excision, follow up in 4 months for FBSE given history of melanoma.    Staff and Medical Student:     The patient was seen and staffed with Dr. Fabrice Valera.    Regina Messer, MS3    I was present with the medical student who participated in the service and in the documentation.  I have verified the history and personally performed the physical exam and medical decision making.  I agree with the assessment and plan of care as documented in the note.  I personally performed all procedures.    Fabrice Valera MD  Dermatology Attending    ____________________________________________    CC: Skin Check (Here today for a skin check. 5 spots of concerns. )    HPI:  Ms. Jacqui Grijalva is a(n) 58 year old female who presents today as a new patient for FBSE. Her last FBSE was in December at [facility]. She is hoping to establish care back at the [facility].    Since her last visit here in 2019, she has a number of updates to her skin history from [facility]:  Melanoma, Left lower leg (Excised Fall 2023)  SCC, R lower leg (Excised Fall 2023)  BCC, L cheek (Excised Fall 2023), L upper thigh (biopsied Fall 2023,not yet treated)   Atypical Nevus, L inferior axilla (Excised Fall 2023)     Family history of skin cancers: father had spots removed but per patient, he was a poor historian so type is unknown. Wears sunscreen regularly. History of tanning bed exposure (~100).     Patient is otherwise feeling well, without additional skin concerns.    Labs:  None reviewed.    Physical Exam:  Vitals: LMP 08/01/2016 (Approximate)   SKIN: Full skin, which  includes the head/face, both arms, chest, back, abdomen,both legs, genitalia and/or groin buttocks, digits and/or nails, was examined.  - There are well-healing scars from previous excisions on the right lower leg, right arm, left cheek, right chest. Unable to recheck the scar on the left leg due to cast from ankle fusion.   - There are dome shaped bright red papules on the chest.   - Multiple regular brown pigmented macules and papules are identified on the trunk and extremities.   - Scattered brown macules on sun exposed areas.  - There are waxy stuck on tan to brown papule on the trunk.   - Waxy stuck on papule with annular scale on the left hand.  - Pink, pearly papule on the right chest.    - No other lesions of concern on areas examined.                 Medications:  Current Outpatient Medications   Medication     HYDROcodone-acetaminophen (NORCO) 5-325 MG tablet     hydrOXYzine HCl (ATARAX) 25 MG tablet     hydrOXYzine HCl (ATARAX) 25 MG tablet     IBUPROFEN PO     multivitamin w/minerals (THERA-VIT-M) tablet     omeprazole (PRILOSEC) 40 MG DR capsule     valACYclovir (VALTREX) 1000 mg tablet     No current facility-administered medications for this visit.      Past Medical History:   Patient Active Problem List   Diagnosis     Tear film insufficiency     Chronic allergic conjunctivitis - Both Eyes     Post-traumatic osteoarthritis of right knee     Ankle pain, left     Adjustment disorder with anxious mood     Melanoma of skin (H)     Past Medical History:   Diagnosis Date     Pain in joint, ankle and foot, left     traumatic injury in childhood     Post-traumatic osteoarthritis of right knee 07/11/2016        CC Referred Self, MD  No address on file on close of this encounter.

## 2024-03-01 LAB
PATH REPORT.COMMENTS IMP SPEC: NORMAL
PATH REPORT.COMMENTS IMP SPEC: NORMAL
PATH REPORT.FINAL DX SPEC: NORMAL
PATH REPORT.GROSS SPEC: NORMAL
PATH REPORT.MICROSCOPIC SPEC OTHER STN: NORMAL
PATH REPORT.RELEVANT HX SPEC: NORMAL

## 2024-03-04 ENCOUNTER — ALLIED HEALTH/NURSE VISIT (OUTPATIENT)
Dept: FAMILY MEDICINE | Facility: CLINIC | Age: 59
End: 2024-03-04
Payer: COMMERCIAL

## 2024-03-04 ENCOUNTER — TELEPHONE (OUTPATIENT)
Dept: DERMATOLOGY | Facility: CLINIC | Age: 59
End: 2024-03-04

## 2024-03-04 DIAGNOSIS — Z23 ENCOUNTER FOR IMMUNIZATION: Primary | ICD-10-CM

## 2024-03-04 DIAGNOSIS — K21.00 GASTROESOPHAGEAL REFLUX DISEASE WITH ESOPHAGITIS WITHOUT HEMORRHAGE: ICD-10-CM

## 2024-03-04 PROCEDURE — 90750 HZV VACC RECOMBINANT IM: CPT

## 2024-03-04 PROCEDURE — 90472 IMMUNIZATION ADMIN EACH ADD: CPT

## 2024-03-04 PROCEDURE — 90471 IMMUNIZATION ADMIN: CPT

## 2024-03-04 PROCEDURE — 99207 PR NO CHARGE NURSE ONLY: CPT

## 2024-03-04 PROCEDURE — 90746 HEPB VACCINE 3 DOSE ADULT IM: CPT

## 2024-03-04 RX ORDER — OMEPRAZOLE 40 MG/1
40 CAPSULE, DELAYED RELEASE ORAL DAILY
Qty: 90 CAPSULE | Refills: 0 | Status: SHIPPED | OUTPATIENT
Start: 2024-03-04 | End: 2024-09-19

## 2024-03-04 NOTE — PROGRESS NOTES
Prior to immunization administration, verified patients identity using patient s name and date of birth. Please see Immunization Activity for additional information.     Screening Questionnaire for Adult Immunization    Are you sick today?   No   Do you have allergies to medications, food, a vaccine component or latex?   No   Have you ever had a serious reaction after receiving a vaccination?   No   Do you have a long-term health problem with heart, lung, kidney, or metabolic disease (e.g., diabetes), asthma, a blood disorder, no spleen, complement component deficiency, a cochlear implant, or a spinal fluid leak?  Are you on long-term aspirin therapy?   No   Do you have cancer, leukemia, HIV/AIDS, or any other immune system problem?   No   Do you have a parent, brother, or sister with an immune system problem?   No   In the past 3 months, have you taken medications that affect  your immune system, such as prednisone, other steroids, or anticancer drugs; drugs for the treatment of rheumatoid arthritis, Crohn s disease, or psoriasis; or have you had radiation treatments?   No   Have you had a seizure, or a brain or other nervous system problem?   No   During the past year, have you received a transfusion of blood or blood    products, or been given immune (gamma) globulin or antiviral drug?   No   For women: Are you pregnant or is there a chance you could become       pregnant during the next month?   No   Have you received any vaccinations in the past 4 weeks?   No     Immunization questionnaire answers were all negative.    I have reviewed the following standing orders:   This patient is due and qualifies for the Hepatitis B vaccine.    Click here for Hepatitis B Standing Order    I have reviewed the vaccines inclusion and exclusion criteria; No concerns regarding eligibility.         This patient is due and qualifies for the Zoster vaccine.    Click here for Zoster Standing Order    I have reviewed the vaccines  inclusion and exclusion criteria; No concerns regarding eligibility.     Patient instructed to remain in clinic for 15 minutes afterwards, and to report any adverse reactions.     Screening performed by Donnie Reyes MA on 3/4/2024 at 3:34 PM.

## 2024-03-04 NOTE — TELEPHONE ENCOUNTER
Left patient a voicemail to schedule an excision for Basal Cell Carcinoma, L upper leg  with Dr. Moreno. Provided my direct phone number.    Shilpa Grewal on 3/4/2024 at 3:10 PM

## 2024-03-07 NOTE — TELEPHONE ENCOUNTER
"Called patient to schedule surgery with Dr. Moreno    Date of Surgery: 05/06    Surgery type: excision     Consult scheduled: No    Has patient had mohs with us before? Not Applicable    Additional comments: excision for Basal Cell Carcinoma, L upper leg. Biopsied at [facility].    Pg 3 of \"progress/clinic note - outside records\" under media tab.     Shilpa Grewal on 3/7/2024 at 11:21 AM    "

## 2024-03-07 NOTE — TELEPHONE ENCOUNTER
Pt was instructed to follow up with Dr. Valera in 4 months, but there are no openings in 4 months. Please assist with scheduling.     Thanks,  Shilpa Grewal, Procedure  3/7/2024 11:26 AM

## 2024-03-12 ENCOUNTER — OFFICE VISIT (OUTPATIENT)
Dept: ORTHOPEDICS | Facility: CLINIC | Age: 59
End: 2024-03-12
Payer: COMMERCIAL

## 2024-03-12 DIAGNOSIS — M25.572 PAIN IN JOINT, ANKLE AND FOOT, LEFT: Primary | ICD-10-CM

## 2024-03-12 PROCEDURE — 99024 POSTOP FOLLOW-UP VISIT: CPT | Performed by: ORTHOPAEDIC SURGERY

## 2024-03-12 NOTE — LETTER
3/12/2024         RE: Jacqui Grijalva  [address]  [place] [address]        Dear Colleague,    Thank you for referring your patient, Jacqui Grijalva, to the [facility] [place]. Please see a copy of my visit note below.    Chief complaint: Status post left ankle arthrodesis performed December 27, 2023    Patient presents today for further follow-up.  Reports to be doing well reports to be compliant.    On today's exam she presents with well-healed surgical incisions or 6 alignment of the left ankle.  There is some diffuse swelling through the lower leg and foot.    A CT scan has been obtained approximately 2 weeks ago which significant for showing partial consolidation across the fusion site.  Hardware is intact and in place alignment is excellent.    Assessment: Status post left ankle arthrodesis    Plan: I discussed with the patient that at this point she will proceed with weightbearing as tolerated with use of the cam walker cam walker will utilize at all times except for hygiene resting state and sleeping activities.    Patient will not push through the pain and she will back off if any pain is present across the ankle joint region.    She will return to clinic in a month from now and at that time 3 views of the left ankle will be obtained and based on those findings recommendations will be given to the patient.    All questions were answered.      Aman Rincon MD

## 2024-03-12 NOTE — NURSING NOTE
Reason For Visit:   Chief Complaint   Patient presents with    RECHECK     Follow up left ankle and review CT. S/p left ankle fusion performed December 27, 2023       58 year old  1965    Primary MD: Bijal Browne      [facility] 08/01/2016 (Approximate)     Pain Assessment  Patient Currently in Pain: Matt Mayo, ATC

## 2024-03-13 NOTE — PROGRESS NOTES
Chief complaint: Status post left ankle arthrodesis performed December 27, 2023    Patient presents today for further follow-up.  Reports to be doing well reports to be compliant.    On today's exam she presents with well-healed surgical incisions or 6 alignment of the left ankle.  There is some diffuse swelling through the lower leg and foot.    A CT scan has been obtained approximately 2 weeks ago which significant for showing partial consolidation across the fusion site.  Hardware is intact and in place alignment is excellent.    Assessment: Status post left ankle arthrodesis    Plan: I discussed with the patient that at this point she will proceed with weightbearing as tolerated with use of the cam walker cam walker will utilize at all times except for hygiene resting state and sleeping activities.    Patient will not push through the pain and she will back off if any pain is present across the ankle joint region.    She will return to clinic in a month from now and at that time 3 views of the left ankle will be obtained and based on those findings recommendations will be given to the patient.    All questions were answered.

## 2024-03-27 ENCOUNTER — TELEPHONE (OUTPATIENT)
Dept: GASTROENTEROLOGY | Facility: CLINIC | Age: 59
End: 2024-03-27
Payer: COMMERCIAL

## 2024-03-27 DIAGNOSIS — Z12.11 ENCOUNTER FOR SCREENING COLONOSCOPY: Primary | ICD-10-CM

## 2024-03-27 RX ORDER — BISACODYL 5 MG/1
TABLET, DELAYED RELEASE ORAL
Qty: 4 TABLET | Refills: 0 | Status: SHIPPED | OUTPATIENT
Start: 2024-03-27 | End: 2024-05-30

## 2024-03-27 NOTE — TELEPHONE ENCOUNTER
Pre visit planning completed.      Procedure details:    Patient scheduled for Colonoscopy  on 4/12/2024 .     Arrival time: 0930. Procedure time 1015    Facility location: [facility]; [address] [place] [address] [place] [address]. Check in location: 1st Floor [facility].     Sedation type: Conscious sedation     Pre op exam needed? N/A    Indication for procedure: Screening       Chart review:     Electronic implanted devices? No    Recent diagnosis of diverticulitis within the last 6 weeks? No    Diabetic? No      Medication review:    Anticoagulants? No    NSAIDS? Yes.  Ibuprofen (Advil, Motrin).  Holding interval of 1 day before procedure.    Other medication HOLDING recommendations:  N/A      Prep for procedure:     Bowel prep recommendation: Extended Golytely. Bowel prep prescription sent  by CRC nurse to [facility] #[id number] [address]   Due to: BMI > 40.     Prep instructions sent via [employer]         Johanny Moreno RN  Endoscopy Procedure Pre Assessment RN  [phone] option 4

## 2024-03-27 NOTE — TELEPHONE ENCOUNTER
Extended Golytely Bowel Prep . Instructions were sent via [employer]. Bowel prep was sent 3/27/2024 to [facility] #[id number] - [address] AT [facility].       Haley Peña RN Colorectal Cancer    Division of Gastroenterology at [facility]

## 2024-03-28 NOTE — TELEPHONE ENCOUNTER
Attempted to contact patient in order to complete pre assessment questions.     No answer. Left message to return call to [phone] option 4    Missed call communication sent via [employer].    Johanny Moreno RN  Endoscopy Procedure Pre-Assessment RN

## 2024-03-29 PROBLEM — C44.719 BASAL CELL CARCINOMA (BCC) OF LEFT THIGH: Status: ACTIVE | Noted: 2024-03-29

## 2024-03-29 PROBLEM — D48.5 NEOPLASM OF UNCERTAIN BEHAVIOR OF SKIN OF CHEST: Status: ACTIVE | Noted: 2024-03-29

## 2024-04-03 NOTE — TELEPHONE ENCOUNTER
Second call attempt to complete pre assessment.     No answer.  Left message to return call to [phone] #4 by next business day prior to 4PM or procedure will be sent to cancel.     Callback required communication sent via [employer].      Mesha Davis RN  Endoscopy Procedure Pre Assessment RN

## 2024-04-04 DIAGNOSIS — Z98.890 STATUS POST FOOT SURGERY: Primary | ICD-10-CM

## 2024-04-05 ENCOUNTER — TELEPHONE (OUTPATIENT)
Dept: GASTROENTEROLOGY | Facility: CLINIC | Age: 59
End: 2024-04-05
Payer: COMMERCIAL

## 2024-04-05 NOTE — TELEPHONE ENCOUNTER
----- Message from Mesha Davis RN sent at 4/5/2024  7:56 AM CDT -----  Regarding:  4/12 Cancel request- pre assessment NOT completed  Pre assessment was not completed for upcoming Colonoscopy scheduled on 4/12/24 at [facility].    Please cancel per policy.       Thank you,     Mesha Davis RN  Endoscopy Procedure Pre Assessment RN  [phone] option 4

## 2024-04-05 NOTE — TELEPHONE ENCOUNTER
No return call received.   Pre assessment was not completed for upcoming scheduled procedure.     Staff message sent to endoscopy scheduling to cancel procedure per policy.       Mesha Davis RN   Endoscopy Procedure Pre Assessment RN

## 2024-04-05 NOTE — TELEPHONE ENCOUNTER
Caller: No call    Reason for Reschedule/Cancellation   (please be detailed, any staff messages or encounters to note?): Cancellation policy - pre-assessment call not completed.      Prior to reschedule please review:  Ordering Provider: Bijal Browne  Sedation Determined: Moderate  Does patient have any ASC Exclusions, please identify?: No      Notes on Cancelled Procedure:  Procedure: Lower Endoscopy [Colonoscopy]   Date: 4/12/2024  Location: [facility]; [facility] [place] [address]   Surgeon: Mickey      Rescheduled: No, sent MyChart and CTL.        Did you cancel or rescheduled an EUS procedure? No.     CASE IN DEPOT

## 2024-04-09 ENCOUNTER — ANCILLARY PROCEDURE (OUTPATIENT)
Dept: GENERAL RADIOLOGY | Facility: CLINIC | Age: 59
End: 2024-04-09
Attending: ORTHOPAEDIC SURGERY
Payer: COMMERCIAL

## 2024-04-09 ENCOUNTER — OFFICE VISIT (OUTPATIENT)
Dept: ORTHOPEDICS | Facility: CLINIC | Age: 59
End: 2024-04-09
Payer: COMMERCIAL

## 2024-04-09 DIAGNOSIS — Z98.890 STATUS POST FOOT SURGERY: Primary | ICD-10-CM

## 2024-04-09 DIAGNOSIS — Z98.890 STATUS POST FOOT SURGERY: ICD-10-CM

## 2024-04-09 PROCEDURE — 73610 X-RAY EXAM OF ANKLE: CPT | Mod: LT | Performed by: RADIOLOGY

## 2024-04-09 PROCEDURE — 99213 OFFICE O/P EST LOW 20 MIN: CPT | Performed by: ORTHOPAEDIC SURGERY

## 2024-04-09 NOTE — PROGRESS NOTES
Chief complaint: Status post left ankle fusion performed December 27, 2023    Patient presents today for further follow-up.  Reports to be doing well denies to have any problems reports to be able to ambulate without the cam walker with minimal pain.    On today's exam she presents with a minimal swelling.  There is no gross motion across the ankle joint.  Presents with a relatively healthy motion across the talonavicular joint.    Plain x-rays of the ankle were reviewed today which are significant for showing excellent consolidation across the fusion site.  There is no radiolucency or signs of loosening.    Assessment: Status post left ankle fusion    Plan: I discussed with the patient that she is making excellent progress.  She is going proceed with activities as tolerated.  Patient was given a prescription for physical therapy for gait training.  She will wean herself off the cam walker over the next few weeks.    Patient was encouraged to visit with us in 3 months from now she is not happy the function of her left ankle.  Otherwise she will follow-up on a as needed basis.    All questions were answered.    TT 20 minutes

## 2024-04-09 NOTE — LETTER
4/9/2024         RE: Jacqui Grijalva  [address]  [place] [address]        Dear Colleague,    Thank you for referring your patient, Jacqui Grijalva, to the [facility] [place]. Please see a copy of my visit note below.    Chief complaint: Status post left ankle fusion performed December 27, 2023    Patient presents today for further follow-up.  Reports to be doing well denies to have any problems reports to be able to ambulate without the cam walker with minimal pain.    On today's exam she presents with a minimal swelling.  There is no gross motion across the ankle joint.  Presents with a relatively healthy motion across the talonavicular joint.    Plain x-rays of the ankle were reviewed today which are significant for showing excellent consolidation across the fusion site.  There is no radiolucency or signs of loosening.    Assessment: Status post left ankle fusion    Plan: I discussed with the patient that she is making excellent progress.  She is going proceed with activities as tolerated.  Patient was given a prescription for physical therapy for gait training.  She will wean herself off the cam walker over the next few weeks.    Patient was encouraged to visit with us in 3 months from now she is not happy the function of her left ankle.  Otherwise she will follow-up on a as needed basis.    All questions were answered.    TT 20 minutes      Aman Rincon MD

## 2024-04-09 NOTE — NURSING NOTE
Reason For Visit:   Chief Complaint   Patient presents with    Surgical Followup     Left ankle fusion 12/27/23. XR today       LMP 08/01/2016 (Approximate)          Jocelyn Bunn ATC

## 2024-04-11 ENCOUNTER — THERAPY VISIT (OUTPATIENT)
Dept: PHYSICAL THERAPY | Facility: CLINIC | Age: 59
End: 2024-04-11
Attending: ORTHOPAEDIC SURGERY
Payer: COMMERCIAL

## 2024-04-11 DIAGNOSIS — Z98.890 STATUS POST FOOT SURGERY: Primary | ICD-10-CM

## 2024-04-11 PROCEDURE — 97112 NEUROMUSCULAR REEDUCATION: CPT | Mod: GP

## 2024-04-11 PROCEDURE — 97161 PT EVAL LOW COMPLEX 20 MIN: CPT | Mod: GP

## 2024-04-11 PROCEDURE — 97110 THERAPEUTIC EXERCISES: CPT | Mod: GP

## 2024-04-11 NOTE — PROGRESS NOTES
PHYSICAL THERAPY EVALUATION  Type of Visit: Evaluation    See electronic medical record for Abuse and Falls Screening details.    Subjective       Presenting condition or subjective complaint:    Pt presenting s/p left ankle arthrodesis on 12/27/2023.  Pt reports that pain is improved compared to before surgery.  She has been out of a cast for 1 month and out of boot 1 week ago. She still has stiffness and swelling.  Ankle can get achey, but pain is no more than 3/10.  Taking Tylenol PRN for pain.  Walking is going okay, but she can't walk fast or far.  She can currently walk a couple of blocks at a time.  She was using crutches but has stopped since she graduated from the boot.    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  INTEGUMENTARY: Intact  POSTURE: WFL  PALPATION: ttp of medial and lateral malleolus  RANGE OF MOTION:   (Degrees) Left AROM Left PROM   Right AROM Right PROM   Ankle Dorsiflexion  5  15   Ankle Plantarflexion  8  40   Ankle Inversion  13  30   Ankle Eversion  10  18   Pain:   End feel:   STRENGTH:   Pain: - none + mild ++ moderate +++ severe  Strength Scale: 0-5/5 Left Right   Ankle Dorsiflexion 4+ 5   Ankle Plantarflexion 4+ 5   Ankle Inversion 4+ 5   Ankle Eversion 4+ 5     Pain: - none + mild ++ moderate +++ severe  Strength Scale: 0-5/5 Left Right   Hip Flexion 5 5   Hip Abduction 4+ 5   Knee Flexion 4+ 5   Knee Extension 4+ 5       BED MOBILITY: Independent    TRANSFERS: Independent    GAIT:   Level of [place]: Independent  Assistive Device(s): None  Gait Deviations: Antalgic  Stride length decreased  Hilda decreased  Pronation decreased L, Push off decreased L  Knee extension decreased L, Knee flexion decreased L  Gait Distance: 150 ft  Stairs: NT    Assessment & Plan   CLINICAL IMPRESSIONS  Medical Diagnosis: Status post foot surgery (Z98.890)    Treatment Diagnosis: s/p left ankle arthrodesis   Impression/Assessment: Patient is a 59 year old female with left ankle complaints.  The following significant findings have been identified: Pain, Decreased ROM/flexibility, Decreased joint mobility, Decreased strength, Impaired gait, Impaired muscle performance, and Decreased activity tolerance. These impairments interfere with their ability to perform self care tasks, recreational activities, household chores, household mobility, and community mobility as compared to previous level of function.     Clinical Decision Making (Complexity):  Clinical Presentation: Stable/Uncomplicated  Clinical Presentation Rationale: based on medical and personal factors listed in PT evaluation  Clinical Decision Making (Complexity): Low complexity    PLAN OF CARE  Treatment Interventions:  Interventions: Gait Training, Manual Therapy, Neuromuscular Re-education, Therapeutic Activity, Therapeutic Exercise    Long Term Goals     PT Goal 1  Goal Identifier:  Activity tolerance  Goal Description: Pt will be able to golf and walk for at least 30 minutes to improve QOL.  Goal Progress: ongoing  Target Date: 06/09/24      Frequency of Treatment: 1x/week  Duration of Treatment: 3 months    Recommended Referrals to Other Professionals:     Education Assessment:   Learner/Method: Patient  Education Comments: HEP, POC    Risks and benefits of evaluation/treatment have been explained.   Patient/Family/caregiver agrees with Plan of Care.     Evaluation Time:     PT Eval, Low Complexity Minutes (62273): 15       Signing Clinician: Joaquin Wong PT

## 2024-04-30 ENCOUNTER — THERAPY VISIT (OUTPATIENT)
Dept: PHYSICAL THERAPY | Facility: CLINIC | Age: 59
End: 2024-04-30
Payer: COMMERCIAL

## 2024-04-30 ENCOUNTER — TELEPHONE (OUTPATIENT)
Dept: GASTROENTEROLOGY | Facility: CLINIC | Age: 59
End: 2024-04-30

## 2024-04-30 DIAGNOSIS — Z98.890 STATUS POST FOOT SURGERY: Primary | ICD-10-CM

## 2024-04-30 PROCEDURE — 97112 NEUROMUSCULAR REEDUCATION: CPT | Mod: GP

## 2024-04-30 PROCEDURE — 97110 THERAPEUTIC EXERCISES: CPT | Mod: GP

## 2024-04-30 NOTE — TELEPHONE ENCOUNTER
Screening completed 2/21, no changes, rescheduling canceled due to missed PA call.    Final Scheduling Details     Procedure scheduled  Colonoscopy    Surgeon:  CRISSY     Date of procedure:  7/23     Pre-OP / PAC:   No - Not required for this site.    Location  [facility] - Patient preference.    Sedation   Moderate Sedation - Per order.      Patient Reminders:   You will receive a call from a Nurse to review instructions and health history.  This assessment must be completed prior to your procedure.  Failure to complete the Nurse assessment may result in the procedure being cancelled.      On the day of your procedure, please designate an adult(s) who can drive you home stay with you for the next 24 hours. The medicines used in the exam will make you sleepy. You will not be able to drive.      You cannot take public transportation, ride share services, or non-medical taxi service without a responsible caregiver.  Medical transport services are allowed with the requirement that a responsible caregiver will receive you at your destination.  We require that drivers and caregivers are confirmed prior to your procedure.

## 2024-05-06 ENCOUNTER — OFFICE VISIT (OUTPATIENT)
Dept: DERMATOLOGY | Facility: CLINIC | Age: 59
End: 2024-05-06
Payer: COMMERCIAL

## 2024-05-06 ENCOUNTER — TELEPHONE (OUTPATIENT)
Dept: DERMATOLOGY | Facility: CLINIC | Age: 59
End: 2024-05-06

## 2024-05-06 VITALS — DIASTOLIC BLOOD PRESSURE: 82 MMHG | SYSTOLIC BLOOD PRESSURE: 137 MMHG | HEART RATE: 102 BPM

## 2024-05-06 DIAGNOSIS — C44.719 BASAL CELL CARCINOMA (BCC) OF LEFT THIGH: ICD-10-CM

## 2024-05-06 PROCEDURE — 88305 TISSUE EXAM BY PATHOLOGIST: CPT | Mod: 26 | Performed by: PATHOLOGY

## 2024-05-06 PROCEDURE — 12032 INTMD RPR S/A/T/EXT 2.6-7.5: CPT | Mod: GC | Performed by: DERMATOLOGY

## 2024-05-06 PROCEDURE — 11602 EXC TR-EXT MAL+MARG 1.1-2 CM: CPT | Mod: GC | Performed by: DERMATOLOGY

## 2024-05-06 PROCEDURE — 88305 TISSUE EXAM BY PATHOLOGIST: CPT | Mod: TC | Performed by: DERMATOLOGY

## 2024-05-06 NOTE — TELEPHONE ENCOUNTER
Follow up call completed following excision procedure with Dr. Moreno.       Are you having pain? no  Are you taking pain medication? no  Are you applying ice?  no  Have you had any noticeable bleeding through the bandage? no   Do you have any other concerns? no       Please call [phone] if you have any questions or concerns.

## 2024-05-06 NOTE — NURSING NOTE
Chief Complaint   Patient presents with    Derm Problem     BCC L upper leg     Belen RAMSEY, RN-BSN  Dermatology Surgery  [phone]

## 2024-05-06 NOTE — TELEPHONE ENCOUNTER
Derm path was sent via fax and received. shared with 's via email also.     Shilpa Grewal, Procedure  5/6/2024 9:18 AM

## 2024-05-06 NOTE — TELEPHONE ENCOUNTER
Rona from [facility] is faxing the path now.     Shilpa Grewal, Procedure  5/6/2024 8:58 AM

## 2024-05-06 NOTE — PATIENT INSTRUCTIONS
Wound care    I will experience scar, bleeding, swelling, pain, crusting and redness. I may experience incomplete removal or recurrence. Risks are bleeding, bruising, swelling, infection, nerve damage, & a large wound. A second procedure may be recommended to obtain the best cosmetic or functional result.       A three month office visit with your Surgeon is recommended for scar evaluation. Please reach out sooner if you have concerns about you surgical site/wound.    Caring for your skin after surgery    After your surgery, a pressure bandage will be placed over the area that has stitches. This is important to prevent bleeding. Please follow these instructions over the next 1 to 2 weeks. Following this regimen will help to prevent complications as your wound heals.     For the first 48 hours after your surgery:    Leave the pressure dressing on and keep it dry. If it should come loose, you may re-tape it, but do not take it off.  Relax and take it easy. Do not do any vigorous exercise or heavy lifting. This could cause the wound to bleed.  Post-Operative pain is usually mild. If you are able to take tylenol, You may take plain or extra-strength Tylenol (acetaminophen) As directed on the bottle (do not take more than 4,000mg in one day). If you are able to take ibuprofen, you can alternate the tylenol and ibuprofen.   Avoid alcohol as this may increase your tendency to bleed.   You may put an ice pack around the bandaged area for 20 minutes at a time as needed. This may help reduce swelling, bruising, and pain. Make sure the ice pack is waterproof so that the pressure bandage doesn t get wet.  If the wound is on the face try to sleep with your head elevated. Either in a recliner or propped up in bed, this will decrease swelling around the eyes.   You may see a small amount of drainage or blood on your pressure bandage. This is normal. However:  If drainage or bleeding continues or saturates the bandage, you will  "need to apply firm pressure over the bandage with a piece of gauze for 15 minutes.  If bleeding continues after applying pressure for 15 minutes, apply an ice pack to the bandaged area for 15 minutes.  If bleeding still continues, call our office or go to the nearest emergency room.    Remove pressure dressing 48 hours after surgery:    Carefully remove the pressure bandage. If it seems sticky or too difficult to get off, you may need to soak it off in the shower.  After the pressure dressing is removed, you may shower and get the wound wet. However, Do Not let the forceful stream of the shower hit the wound directly.  Follow these wound care and dressing change instructions:   In the shower wash the surgical site last with its own separate wash cloth.  You may allow water to run over the site. Take a clean wash cloth wet with soapy warm water and gently pat the suture site to help remove any crust or drainage.   Do Not rub or scrub the site    After site is clean pat dry and apply a thin layer of Vaseline ointment  over the suture site with a cotton swab or clean finger.   Cover the suture site with Telfa (non-stick) dressing. You may tape a piece of gauze over the Telfa for extra protection if you wish.  Continue wound care at least once a day, twice if you are active or around a contaminated environment.  Continue daily wound care until your surgical site is completely healed.   Dissolving stitches, if you have been told your stitches are dissolving they should dissolve in one to one and a half week.      If you are able to take acetaminophen (\"Tylenol,\" etc.) and ibuprofen (\"Advil\" or \"Motrin,\" etc.), then you may STAGGER these medications by taking 400 mg of ibuprofen (usually two tabs) every 8 hours and 1,000 mg of acetaminophen (e.g., two tabs of extra-strength Tylenol) every 8 hours.    This means, for example, that you could take the followin,000 mg of Tylenol, followed 4 hours later by 400 mg " Ibuprofen, followed 4 hours later with 1,000 mg of Tylenol, followed 4 hours later by 400 mg Ibuprofen, followed 4 hours later with 1,000 mg of Tylenol, and so forth.     Essentially, you can take either 1,000 mg of Tylenol or 400 mg of ibuprofen in alternating fashion EVERY FOUR HOURS.    Do NOT exceed more than 4,000 mg of Tylenol or 3,200 mg of ibuprofen per 24 hours. If you are not able to take Tylenol or ibuprofen as above due to other health issues (or a physician has told you directly that you are not allowed to take one of them, say due to pre-existing severe liver or kidney issues), then disregard the above directions.    Scientific evidence supports that this combination/schedule of pain medications is just as effective, if not more effective, than taking a narcotic pain medicine.       Follow up will be a 3 month scar evaluation either in person or via a telephone visit with you sending in a photo via [employer] Unless you have been told to follow up sooner or if you have concerns and would like to be see sooner. Please call or send us in a [employer] message if possible and attach a photo.        What to expect:    The first couple of days your wound may be tender and may bleed slightly when doing wound care.  There may be swelling and bruising around the wound, especially if it is near the eyes. For your comfort, you may apply ice or cold compresses to the bruises after your have removed the pressure bandage.  The area around your wound may be numb for several weeks or even months.  You may experience periodic sharp pain or mild itching around the wound as it heals.   The suture line will look dark for a while but will lighten over time.       When to call us:    You have bleeding that will not stop after applying pressure and ice.  You have pain that is not controlled with Tylenol (acetaminophen.)  You have signs or symptoms of an infection such as:  Fever over 100 degrees Fahrenheit  Redness, warmth or  foul-smelling drainage from the wound  If you have any questions, or are not sure how to take care of the wound.    Phone numbers:    During business hours (M-F 8:00-4:30 p.m.)    [facility]- [phone] (Option 1 appt. Ask the call representative for the Dermatology Surgery Team)    For Dermatology Surgery scheduling please call Shilpa at [phone]    ---------------------------------------------------------  Evenings/Weekends/Holidays    Hospital - [phone] (Ask for the dermatology resident on call. They will connect with the surgery Fellow)  TTY for hearing sdyeqqmc-[phone]  *Ask  to page dermatologist on-call  Emergency Yypk-[phone]  TTY for hearing impaired- [phone]

## 2024-05-06 NOTE — LETTER
5/6/2024       RE: Jacqui Grijalva  [address]  [place] [address]     Dear Colleague,    Thank you for referring your patient, Jacqui Grijalva, to the [facility] [place] at [facility]. Please see a copy of my visit note below.    DERMATOLOGY EXCISION PROCEDURE NOTE    Dermatology Problem List:  Melanoma    - Left lower leg (Excised Fall 2023 at [facility])  2. NMSC  - BCC, left anterior thigh, s/p excision 5/6/24  - SCC, right lower leg (Excised Fall 2023 at [facility])  - BCC, left cheek (Excised Fall 2023 at [facility])  - BCC, left upper thigh s/p exc 5/6/24  - BCC, right forearm (Excised ~2010)  - BCC, upper chest (Excised ~2010)  3. Atypical Nevus, left inferior axilla (Excised Fall 2023 at [facility])   4. Benign lichenoid keratosis, right mid chest, s/p shave 2/29/24  5. Porokeratosis      NAME OF PROCEDURE: Excision intermediate layered linear closure  Staff surgeon: Mark Moreno MD  Resident: Melissa Sherman MD  Scrub Nurse: Sandra Acosta RN    PRE-OPERATIVE DIAGNOSIS:  Basal Cell Carcinoma  POST-OPERATIVE DIAGNOSIS: Same   LOCATION: left anterior thigh   FINAL EXCISION SIZE(DEFECT SIZE): 1.6 x 1.4 cm  MARGIN: 0.4 cm  FINAL REPAIR LENGTH: 4.0 cm   ANESTHESIA: Lidocaine 1% with epi 1 : 100,000      INDICATIONS: This patient presented with a 0.8 x 0.6 cm Basal Cell Carcinoma. Excision was indicated. We discussed the principles of treatment and most likely complications including scarring, bleeding, infection, incomplete excision, wound dehiscence, pain, nerve damage, and recurrence. Informed consent was obtained and the patient underwent the procedure as follows:    PROCEDURE: The patient was taken to the operative suite. Time-out was performed.  The treatment area was anesthetized with 1% lidocaine with epinephrine. The area was prepped with  Chlorhexidine and rinsed with sterile saline and draped with sterile towels. The lesion was delineated and excised down to subcutaneous fat in a elliptical manner. Hemostasis was obtained by Hyfrecator.     REPAIR: An intermediate layered linear closure was selected as the procedure which would maximally preserve both function and cosmesis.    After the excision of the tumor, the area was carefully undermined. Hemostasis was obtained with electrocoagulation.  Closure was oriented so that the wound was in the patient's natural skin tension lines. The subcutaneous and dermal layers were then closed with 4-0 monocryl sutures. The epidermis was then carefully approximated along the length of the wound using 4-0 running subcuticular sutures.     Estimated blood loss was less than 10 ml for all surgical sites. A sterile pressure dressing was applied and wound care instructions, with a written handout, were given. The patient was discharged from the [facility] alert and ambulatory.    The patient elected for pathology results to automatically release and understands that the clinical staff will contact them as soon as possible to notify them of the results.    Follow-up PRN with dermatology surgery and continue skin checks with general dermatology.    Dr. Mark Moreno was immediately available for the entire surgery and was physicially present for the key portions of the procedure.    Anatomic Pathology Results: pending    Clinical Follow-Up: 9/26/24 as scheduled with gen em Sherman MD  Micrographic Surgery and Dermatologic Oncology (MSDO) Fellow, PGY-5    Staff Involved:   Scribe/Fellow/Staff    Scribe Disclosure:   TRUE, LORETTA PRATT, am serving as a scribe; to document services personally performed by Mark Moreno MD -based on data collection and the provider's statements to me.       Attending attestation:  TRUE was present for key elements of the procedure and immediately available for all other  portions of the procedure.  I have reviewed the note and edited it as necessary.    Mark Moreno M.D.  Professor  Director of Dermatologic Surgery  Department of Dermatology  [facility]    Dermatology Surgery Clinic  [facility] [place] [phone]

## 2024-05-06 NOTE — PROGRESS NOTES
DERMATOLOGY EXCISION PROCEDURE NOTE    Dermatology Problem List:  Melanoma    - Left lower leg (Excised Fall 2023 at [facility])  2. NMSC  - BCC, left anterior thigh, s/p excision 5/6/24  - SCC, right lower leg (Excised Fall 2023 at [facility])  - BCC, left cheek (Excised Fall 2023 at [facility])  - BCC, left upper thigh s/p exc 5/6/24  - BCC, right forearm (Excised ~2010)  - BCC, upper chest (Excised ~2010)  3. Atypical Nevus, left inferior axilla (Excised Fall 2023 at [facility])   4. Benign lichenoid keratosis, right mid chest, s/p shave 2/29/24  5. Porokeratosis      NAME OF PROCEDURE: Excision intermediate layered linear closure  Staff surgeon: Mark Moreno MD  Resident: Melissa Sherman MD  Scrub Nurse: Sandra Acosta RN    PRE-OPERATIVE DIAGNOSIS:  Basal Cell Carcinoma  POST-OPERATIVE DIAGNOSIS: Same   LOCATION: left anterior thigh   FINAL EXCISION SIZE(DEFECT SIZE): 1.6 x 1.4 cm  MARGIN: 0.4 cm  FINAL REPAIR LENGTH: 4.0 cm   ANESTHESIA: Lidocaine 1% with epi 1 : 100,000      INDICATIONS: This patient presented with a 0.8 x 0.6 cm Basal Cell Carcinoma. Excision was indicated. We discussed the principles of treatment and most likely complications including scarring, bleeding, infection, incomplete excision, wound dehiscence, pain, nerve damage, and recurrence. Informed consent was obtained and the patient underwent the procedure as follows:    PROCEDURE: The patient was taken to the operative suite. Time-out was performed.  The treatment area was anesthetized with 1% lidocaine with epinephrine. The area was prepped with Chlorhexidine and rinsed with sterile saline and draped with sterile towels. The lesion was delineated and excised down to subcutaneous fat in a elliptical manner. Hemostasis was obtained by Hyfrecator.     REPAIR: An intermediate layered linear closure was selected as the procedure which would maximally preserve both function and  cosmesis.    After the excision of the tumor, the area was carefully undermined. Hemostasis was obtained with electrocoagulation.  Closure was oriented so that the wound was in the patient's natural skin tension lines. The subcutaneous and dermal layers were then closed with 4-0 monocryl sutures. The epidermis was then carefully approximated along the length of the wound using 4-0 running subcuticular sutures.     Estimated blood loss was less than 10 ml for all surgical sites. A sterile pressure dressing was applied and wound care instructions, with a written handout, were given. [facility] and [facility]ulatory.    The patient elected for pathology results to automatically release and understands that the clinical staff will contact them as soon as possible to notify them of the results.    Follow-up PRN with dermatology surgery and continue skin checks with general dermatology.    Dr. Mark Moreno was immediately available for the entire surgery and was physicially present for the key portions of the procedure.    Anatomic Pathology Results: pending    Clinical Follow-Up: 9/26/24 as scheduled with gen em Sherman MD  Micrographic Surgery and Dermatologic Oncology (MSDO) Fellow, PGY-5    Staff Involved:   Scribe/Fellow/Staff    Scribe Disclosure:   I, LORETTA PRATT, am serving as a scribe; to document services personally performed by Mark Moreno MD -based on data collection and the provider's statements to me.       Attending attestation:  I was present for key elements of the procedure and immediately available for all other portions of the procedure.  I have reviewed the note and edited it as necessary.    Mark Moreno M.D.  Professor  Director of Dermatologic Surgery  Department of Dermatology  [facility]    [facility] Surgery Clinic  [facility]  [address] [place] [address]  [id number]

## 2024-05-16 ENCOUNTER — THERAPY VISIT (OUTPATIENT)
Dept: PHYSICAL THERAPY | Facility: CLINIC | Age: 59
End: 2024-05-16
Attending: ORTHOPAEDIC SURGERY
Payer: COMMERCIAL

## 2024-05-16 DIAGNOSIS — Z98.890 STATUS POST FOOT SURGERY: Primary | ICD-10-CM

## 2024-05-16 PROCEDURE — 97112 NEUROMUSCULAR REEDUCATION: CPT | Mod: GP

## 2024-05-16 PROCEDURE — 97110 THERAPEUTIC EXERCISES: CPT | Mod: GP

## 2024-05-29 ENCOUNTER — TELEPHONE (OUTPATIENT)
Dept: ENDOCRINOLOGY | Facility: CLINIC | Age: 59
End: 2024-05-29
Payer: COMMERCIAL

## 2024-05-30 ENCOUNTER — THERAPY VISIT (OUTPATIENT)
Dept: PHYSICAL THERAPY | Facility: CLINIC | Age: 59
End: 2024-05-30
Payer: COMMERCIAL

## 2024-05-30 ENCOUNTER — OFFICE VISIT (OUTPATIENT)
Dept: ENDOCRINOLOGY | Facility: CLINIC | Age: 59
End: 2024-05-30
Payer: COMMERCIAL

## 2024-05-30 VITALS
HEART RATE: 79 BPM | SYSTOLIC BLOOD PRESSURE: 132 MMHG | BODY MASS INDEX: 41.02 KG/M2 | WEIGHT: 293 LBS | HEIGHT: 71 IN | OXYGEN SATURATION: 99 % | DIASTOLIC BLOOD PRESSURE: 83 MMHG

## 2024-05-30 DIAGNOSIS — E66.01 CLASS 3 SEVERE OBESITY WITH SERIOUS COMORBIDITY AND BODY MASS INDEX (BMI) OF 40.0 TO 44.9 IN ADULT, UNSPECIFIED OBESITY TYPE (H): Primary | ICD-10-CM

## 2024-05-30 DIAGNOSIS — M17.31 POST-TRAUMATIC OSTEOARTHRITIS OF RIGHT KNEE: ICD-10-CM

## 2024-05-30 DIAGNOSIS — Z98.890 STATUS POST FOOT SURGERY: Primary | ICD-10-CM

## 2024-05-30 DIAGNOSIS — F43.22 ADJUSTMENT DISORDER WITH ANXIOUS MOOD: ICD-10-CM

## 2024-05-30 DIAGNOSIS — E66.813 CLASS 3 SEVERE OBESITY WITH SERIOUS COMORBIDITY AND BODY MASS INDEX (BMI) OF 40.0 TO 44.9 IN ADULT, UNSPECIFIED OBESITY TYPE (H): Primary | ICD-10-CM

## 2024-05-30 PROCEDURE — 97112 NEUROMUSCULAR REEDUCATION: CPT | Mod: GP

## 2024-05-30 PROCEDURE — 99205 OFFICE O/P NEW HI 60 MIN: CPT

## 2024-05-30 PROCEDURE — 97110 THERAPEUTIC EXERCISES: CPT | Mod: GP

## 2024-05-30 RX ORDER — ACETAMINOPHEN 500 MG
500-1000 TABLET ORAL EVERY 6 HOURS PRN
COMMUNITY

## 2024-05-30 ASSESSMENT — SLEEP AND FATIGUE QUESTIONNAIRES
HOW LIKELY ARE YOU TO NOD OFF OR FALL ASLEEP WHILE WATCHING TV: WOULD NEVER DOZE
HOW LIKELY ARE YOU TO NOD OFF OR FALL ASLEEP WHEN YOU ARE A PASSENGER IN A CAR FOR AN HOUR WITHOUT A BREAK: SLIGHT CHANCE OF DOZING
HOW LIKELY ARE YOU TO NOD OFF OR FALL ASLEEP WHILE SITTING QUIETLY AFTER LUNCH WITHOUT ALCOHOL: WOULD NEVER DOZE
HOW LIKELY ARE YOU TO NOD OFF OR FALL ASLEEP WHILE SITTING AND READING: WOULD NEVER DOZE
HOW LIKELY ARE YOU TO NOD OFF OR FALL ASLEEP WHILE SITTING INACTIVE IN A PUBLIC PLACE: WOULD NEVER DOZE
HOW LIKELY ARE YOU TO NOD OFF OR FALL ASLEEP WHILE LYING DOWN TO REST IN THE AFTERNOON WHEN CIRCUMSTANCES PERMIT: SLIGHT CHANCE OF DOZING
HOW LIKELY ARE YOU TO NOD OFF OR FALL ASLEEP IN A CAR, WHILE STOPPED FOR A FEW MINUTES IN TRAFFIC: WOULD NEVER DOZE
HOW LIKELY ARE YOU TO NOD OFF OR FALL ASLEEP WHILE SITTING AND TALKING TO SOMEONE: WOULD NEVER DOZE

## 2024-05-30 ASSESSMENT — PAIN SCALES - GENERAL: PAINLEVEL: MILD PAIN (3)
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
## 2024-05-30 NOTE — NURSING NOTE
"(   Chief Complaint   Patient presents with    New Patient     Weight management    )    ( Weight: 133.4 kg (294 lb) )  ( Height: 180.3 cm (5' 11\") )  ( BMI (Calculated): 41 )  (   )  ( Cumulative weight loss (lbs): 0 )  (   )  (   )  ( Waist Circumference (cm): 122 cm )  (   )    ( BP: 132/83 )  (   )  (   )  (   )  ( Pulse: 79 )  (   )  ( SpO2: 99 % )    (   Patient Active Problem List   Diagnosis    Tear film insufficiency    Chronic allergic conjunctivitis - Both Eyes    Post-traumatic osteoarthritis of right knee    Ankle pain, left    Adjustment disorder with anxious mood    Melanoma of skin (H)    Neoplasm of uncertain behavior of skin of chest    Basal cell carcinoma (BCC) of left thigh    )  (   Current Outpatient Medications   Medication Sig Dispense Refill    acetaminophen (TYLENOL) 500 MG tablet Take 500-1,000 mg by mouth every 6 hours as needed for mild pain      IBUPROFEN PO       multivitamin w/minerals (THERA-VIT-M) tablet Take 1 tablet by mouth daily      omeprazole (PRILOSEC) 40 MG DR capsule TAKE 1 CAPSULE(40 MG) BY MOUTH DAILY 90 capsule 0    valACYclovir (VALTREX) 1000 mg tablet Take 2 tablets (2,000 mg) by mouth 2 times daily 4 tablet 4    bisacodyl (DULCOLAX) 5 MG EC tablet Two days prior to exam take two (2) tablets at 4pm. One day prior to exam take two (2) tablets at 4pm (Patient not taking: Reported on 5/30/2024) 4 tablet 0    HYDROcodone-acetaminophen (NORCO) 5-325 MG tablet Take 1-2 tablets by mouth every 4 hours as needed for moderate to severe pain 20 tablet 0    hydrOXYzine HCl (ATARAX) 25 MG tablet Take 1 tablet (25 mg) by mouth 3 times daily as needed for itching, anxiety or other (pain) (Patient not taking: Reported on 5/30/2024) 30 tablet 0    hydrOXYzine HCl (ATARAX) 25 MG tablet Take 1 tablet (25 mg) by mouth 3 times daily as needed for itching, anxiety or other (pain) 20 tablet 0    polyethylene glycol (GOLYTELY) 236 g suspension Take as directed. Two days before your exam fill " the first container with water. Cover and shake until mixed well. At 5:00pm drink one 8oz glass every 10-15 minutes until half (1/2) of the first container is empty. Store the remainder in the refrigerator. One day before your exam at 5:00pm drink the second half of the first container until it is gone. Before you go to bed mix the second container with water and put in refrigerator. Six hours before your check in time drink one 8oz glass every 10-15 minutes until half of container is empty. Discard the remainder of solution. (Patient not taking: Reported on 5/30/2024) 8000 mL 0    )  ( Diabetes Eval:    )    ( Pain Eval:  Mild Pain (3) )    ( Wound Eval:       )    (   History   Smoking Status    Never   Smokeless Tobacco    Never    )    ( Signed By:  Shameka Yu; May 30, 2024; 12:55 PM )
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
## 2024-05-31 ENCOUNTER — VIRTUAL VISIT (OUTPATIENT)
Dept: ENDOCRINOLOGY | Facility: CLINIC | Age: 59
End: 2024-05-31
Payer: COMMERCIAL

## 2024-05-31 VITALS — HEIGHT: 71 IN | BODY MASS INDEX: 41.02 KG/M2 | WEIGHT: 293 LBS

## 2024-05-31 DIAGNOSIS — E66.01 CLASS 3 SEVERE OBESITY WITH SERIOUS COMORBIDITY AND BODY MASS INDEX (BMI) OF 40.0 TO 44.9 IN ADULT, UNSPECIFIED OBESITY TYPE (H): Primary | ICD-10-CM

## 2024-05-31 DIAGNOSIS — E66.813 CLASS 3 SEVERE OBESITY WITH SERIOUS COMORBIDITY AND BODY MASS INDEX (BMI) OF 40.0 TO 44.9 IN ADULT, UNSPECIFIED OBESITY TYPE (H): Primary | ICD-10-CM

## 2024-05-31 DIAGNOSIS — Z71.3 NUTRITIONAL COUNSELING: ICD-10-CM

## 2024-05-31 PROCEDURE — 99207 PR NO CHARGE LOS: CPT | Mod: 95 | Performed by: DIETITIAN, REGISTERED

## 2024-05-31 PROCEDURE — 97802 MEDICAL NUTRITION INDIV IN: CPT | Mod: 95 | Performed by: DIETITIAN, REGISTERED

## 2024-05-31 ASSESSMENT — PAIN SCALES - GENERAL: PAINLEVEL: MILD PAIN (3)

## 2024-05-31 NOTE — NURSING NOTE
Is the patient currently in the state of [place]? YES    Visit mode:VIDEO    If the visit is dropped, the patient can be reconnected by: VIDEO VISIT: Text to cell phone:   Telephone Information:   Mobile [phone]       Will anyone else be joining the visit? NO  (If patient encounters technical issues they should call [phone]    How would you like to obtain your AVS? MyChart    Are changes needed to the allergy or medication list? N/A    Are refills needed on medications prescribed by this physician? NO     Reason for visit: Consult    Wt other than 24 hrs:    Pain more than one location:  no  Patricia KRAMER

## 2024-05-31 NOTE — PROGRESS NOTES
"Video-Visit Details    Type of service:  Video Visit    Video Start Time: 9:57 AM   Video End Time: 10:35 AM    Originating Location (pt. Location): Home    Distant Location (provider location):  Offsite (providers home) [facility]NAG[facility] [place]     Platform used for Video Visit: [employer]      New Weight Management Nutrition Consultation    Jacqui Grijalva is a 59 year old female presents today for new weight management nutrition consultation.  Patient referred by Wendy Gonzalez PA-C on May 30, 2024.    Patient with Co-morbidities of obesity includin/29/2024     3:41 PM   --   I have the following health issues associated with obesity Sleep Apnea    GERD (Reflux)    Stress Incontinence    Osteoarthritis (joint disease)   I have the following symptoms associated with obesity Knee Pain    Fatigue         Anthropometrics:  Estimated body mass index is 41 kg/m  as calculated from the following:    Height as of 24: 1.803 m (5' 11\").    Weight as of 24: 133.4 kg (294 lb).      Medications for Weight Loss:  None     NUTRITION HISTORY  Food allergies: None  Food intolerances: spicy foods   Vitamin/Mineral Supplements: MVI   Previous methods of diet modification for weight loss: In last 6 months, started tracking intermittently in [employer] mariela. Identifies needing to moderate carb intake.   Working 8-4, M-F  Doesn't love to cook  Tries not to drink calories  Identifies over eating at night. Comfort eating - stress management, reward.   Pt goals - decrease carb intake, decrease binge eating, healthier swaps for snacking     Recent Diet Recall (Work days -):  Breakfast: Celcius energy drink and ice coffee (black coffee) and Belvita bfast bar   Lunch: 12 pm cafeteria at work - Grain bowl; salad; soup; nachos   Occ piece of chocolate   Dinner: frozen meal (Lean Cuisine; frozen pizza; burrito); chicken with cream sauce; lasagna; veggie fried rice    Snacks: more during the evening, and " on the wknd - cookies and crackers   Beverages: Water, Celsius, black iced coffee, unsweet iced tea.  Alcohol: two glass wine per week   Dining Out: Once weekly dining out for dinner.         5/29/2024     3:41 PM   Diet Recall Review with Patient   If you do eat breakfast, what types of food do you eat? Belvita   If you do eat lunch, what types of food do you typically eat? Rice bowl, sandwich, salad, soup   If you do eat supper, what types of food do you typically eat? Frozen dinner, home cooked meal, restaurant meal, left overs.   If you do snack, what types of food do you typically eat? Crackers, chips, cookies   How many glasses of juice do you drink in a typical day? 0   How many of glasses of milk do you drink in a typical day? 0   How many 8oz glasses of sugar containing drinks such as Cale-Aid/sweet tea do you drink in a day? 0   How many cans/bottles of sugar pop/soda/tea/sports drinks do you drink in a day? 0   How many cans/bottles of diet pop/soda/tea or sports drink do you drink in a day? 0   How often do you have a drink of alcohol? 2-4 Times a Month   If you do drink, how many drinks might you have in a day? 1 or 2           5/29/2024     3:41 PM   Eating Habits   Generally, my meals include foods like these bread, pasta, rice, potatoes, corn, crackers, sweet dessert, pop, or juice Almost Everyday   Generally, my meals include foods like these fried meats, brats, burgers, french fries, pizza, cheese, chips, or ice cream A Few Times a Week   Eat fast food (like McDonalds, Burger Ricky, Taco Bell) Less Than Weekly   Eat at a buffet or sit-down restaurant Once a Week   Eat most of my meals in front of the TV or computer Almost Everyday   Often skip meals, eat at random times, have no regular eating times Less Than Weekly   Rarely sit down for a meal but snack or graze throughout Less Than Weekly   Eat extra snacks between meals A Few Times a Week   Eat most of my food at the end of the day Almost  Everyday   Eat in the middle of the night or wake up at night to eat Less Than Weekly   Eat extra snacks to prevent or correct low blood sugar Never   Eat to prevent acid reflux or stomach pain Never   Worry about not having enough food to eat Never   I eat when I am depressed Less Than Weekly   I eat when I am stressed Once a Week   I eat when I am bored Once a Week   I eat when I am anxious Once a Week   I eat when I am happy or as a reward Once a Week   I feel hungry all the time even if I just have eaten Less Than Weekly   Feeling full is important to me Less Than Weekly   I finish all the food on my plate even if I am already full Almost Everyday   I can't resist eating delicious food or walk past the good food/smell A Few Times a Week   I eat/snack without noticing that I am eating Once a Week   I eat when I am preparing the meal Less Than Weekly   I eat more than usual when I see others eating Less Than Weekly   I have trouble not eating sweets, ice cream, cookies, or chips if they are around the house Almost Everyday   I think about food all day Once a Week   What foods, if any, do you crave? Chips/Crackers   Please list any other foods you crave? All of the above           5/29/2024     3:41 PM   Amount of Food   I feel out of control when eating Weekly   I eat a large amount of food, like a loaf of bread, a box of cookies, a pint/quart of ice cream, all at once Never   I eat a large amount of food even when I am not hungry Monthly   I eat rapidly Weekly   I eat alone because I feel embarrassed and do not want others to see how much I have eaten Never   I eat until I am uncomfortably full Monthly   I feel bad, disgusted, or guilty after I overeat Monthly       Physical Activity:  Recovering from surgery right now, doing PT. Goal is to get back to golfing, walking.         5/29/2024     3:41 PM   Activity/Exercise History   How much of a typical 12 hour day do you spend sitting? Most of the Day   How much of  "a typical 12 hour day do you spend lying down? Less Than Half the Day   How much of a typical day do you spend walking/standing? Less Than Half the Day   How many hours (not including work) do you spend on the TV/Video Games/Computer/Tablet/Phone? 6 Hours or More   How many times a week are you active for the purpose of exercise? 4-5 TImes a Week   What keeps you from being more active? Pain    Too tired   How many total minutes do you spend doing some activity for the purpose of exercising when you exercise? 15-30 Minutes       Nutrition Prescription  Recommended energy/nutrient modification.  <1900 calories/day     Nutrition Diagnosis  Obesity r/t long history of positive energy balance aeb BMI >30.    Nutrition Intervention  Materials/education provided on hypocaloric diet for weight loss. Discussed 1900 calorie/day diet, Volumetric eating to help satiety level on fewer calories; Macronutrient distribution for optimizing satiety and blood sugar management; portion control and healthy food choices (Plate Method and Volumetrics handouts), lower calorie snack choices, meal and snack planning tips and resources. Patient demonstrates understanding.  Co-developed goals to work towards.   Provided pt with list of goals and resources below via [place].     Expected Engagement: good  Follow-Up Plans: nutrient tracking assess     Nutrition Goals  1) Follow <1900 calorie/day plan in Lose It mariela.   Protein: 85+ gm daily (20+% of total calories)   Carb: 45-60 gm per meal, 15-30 gm per snack or less. (30-50% of total calories)   Fat: 60-85 gm daily (30-40% of total calories)      Quick/Easy Protein Sources:  Hard boiled eggs  Part-skim cheese sticks  Baby Bell cheese rounds  Low-fat/low-sugar Greek yogurt  Low-fat cottage cheese  Lean deli meat (chicken/turkey/ham)  Roasted chickpeas or lentils  Nuts   Turkey meat stick  Protein shake/bar  \"P3\" snack (cheese, nuts, deli meat)  Aldi's \"Protein Bread\"   \"Egglife\" egg white wrap  "   Tuna/salmon can/packet     Meal Replacement Shake Options:   *Protein Shake Criteria: no more than 210 Calories, at least 20 grams of protein, and less than 10 grams of sugar   Premier Protein (160 Calories, 30 g protein)  Slim Fast Advanced Nutrition (180 Calories, 20 g protein)  Muscle Milk, lactose-free, 17 oz bottle (210 Calories, 30 g protein)  Integrated Supplements, no artificial sugars (110 Calories, 20 g protein)  Boost/Ensure Max (160 calories, 30 gm protein)   Fairlife Protein Shakes (160-230 calories, 26-42 gm protein)  Aldi's Elevation Protein Powder (180 calories, 30 gm protein)   Orgain Protein Shakes (130-160 calories, 20-26 gm protein)     Meal Replacement Bar Options:  Quest Protein Bars (190 Calories, 20 g protein)  Built Bar (170 Calories, 15-20 g protein)  One Protein Bar (210 calories, 20 g protein)  Montoya Signature Protein Bar (Costco) (190 Calories, 21 g protein)  Pure Protein Bars (180 Calories, 21 g protein)    Low Calorie Frozen Meal:  Healthy Choice Power Bowls  Lean Cuisine  Smart Ones  Behzad Carlos Delights    The Plate Method  [email or web address]    Protein Sources   [email or web address]     Carbohydrates  [email or web address]     Mindful Eating  [email or web address]     Summary of Volumetrics Eating Plan  [email or web address]     At Home Exercise Resources  [place] Library - Exercises by Muscle Group  https://www.En[place]ch.org/resources/everyone/exercise-library/    Channels with Exercise Modifications:  Coach Alberto (strengthening) https://www.youtube.com/@Iris  HASfit (strengthening): https://www.youBiscayne Pharmaceuticalsube.com/channel/UZAMF6-TZNMc28VK-s0TKoaL  Yoga with Zelinda: https://www.[email or web address] Pharmaceuticalsube.[email or web address]  Qgz1Mkvt (strengthening for any age, functional strength training, examples of exercise for seniors): https://www.youtube.com/@yha6hbfo    Handouts Relating to Exercise :    1.     Learning About Being Physically Active     2.       Muscle Conditionin Exercises    3.     Resistance Training with Free Weights: 3 Exercises    4.      Resistance Training with Surgical Tubin Exercises    5.     Stretchin Exercises    6.     Seated Exercises for Arms and Legs: 11 Exercises      Follow-Up:  6 weeks, PRN    Time spent with patient: 38 minutes.  Ann Marie Bermudez RD, LD

## 2024-05-31 NOTE — LETTER
"2024       RE: Jacqui Grijalva  [address]  [place] [address]     Dear Colleague,    Thank you for referring your patient, Jacqui Grijalva, to the [facility] [place] [facility]. Please see a copy of my visit note below.    Video-Visit Details    Type of service:  Video Visit    Video Start Time: 9:57 AM   Video End Time: 10:35 AM    Originating Location (pt. Location): Home    Distant Location (provider location):  Offsite (providers home) [facility] [place]     Platform used for Video Visit: Ideal [employer]      New Weight Management Nutrition Consultation    Jacqui Grijalva is a 59 year old female presents today for new weight management nutrition consultation.  Patient referred by Wendy Gonzalez PA-C on May 30, 2024.    Patient with Co-morbidities of obesity includin/29/2024     3:41 PM   --   I have the following health issues associated with obesity Sleep Apnea    GERD (Reflux)    Stress Incontinence    Osteoarthritis (joint disease)   I have the following symptoms associated with obesity Knee Pain    Fatigue         Anthropometrics:  Estimated body mass index is 41 kg/m  as calculated from the following:    Height as of 24: 1.803 m (5' 11\").    Weight as of 24: 133.4 kg (294 lb).      Medications for Weight Loss:  None     NUTRITION HISTORY  Food allergies: None  Food intolerances: spicy foods   Vitamin/Mineral Supplements: MVI   Previous methods of diet modification for weight loss: In last 6 months, started tracking intermittently in WW mariela. Identifies needing to moderate carb intake.   Working 8-4, M-F  Doesn't love to cook  Tries not to drink calories  Identifies over eating at night. Comfort eating - stress management, reward.   Pt goals - decrease carb intake, decrease binge eating, healthier swaps for snacking     Recent Diet Recall (Work " days M-F):  Breakfast: Celcius energy drink and ice coffee (black coffee) and Belvita bfast bar   Lunch: 12 pm cafeteria at work - Grain bowl; salad; soup; nachos   Occ piece of chocolate   Dinner: frozen meal (Lean Cuisine; frozen pizza; burrito); chicken with cream sauce; lasagna; veggie fried rice    Snacks: more during the evening, and on the wknd - cookies and crackers   Beverages: Water, Celsius, black iced coffee, unsweet iced tea.  Alcohol: two glass wine per week   Dining Out: Once weekly dining out for dinner.         5/29/2024     3:41 PM   Diet Recall Review with Patient   If you do eat breakfast, what types of food do you eat? Belvita   If you do eat lunch, what types of food do you typically eat? Rice bowl, sandwich, salad, soup   If you do eat supper, what types of food do you typically eat? Frozen dinner, home cooked meal, restaurant meal, left overs.   If you do snack, what types of food do you typically eat? Crackers, chips, cookies   How many glasses of juice do you drink in a typical day? 0   How many of glasses of milk do you drink in a typical day? 0   How many 8oz glasses of sugar containing drinks such as Cale-Aid/sweet tea do you drink in a day? 0   How many cans/bottles of sugar pop/soda/tea/sports drinks do you drink in a day? 0   How many cans/bottles of diet pop/soda/tea or sports drink do you drink in a day? 0   How often do you have a drink of alcohol? 2-4 Times a Month   If you do drink, how many drinks might you have in a day? 1 or 2           5/29/2024     3:41 PM   Eating Habits   Generally, my meals include foods like these bread, pasta, rice, potatoes, corn, crackers, sweet dessert, pop, or juice Almost Everyday   Generally, my meals include foods like these fried meats, brats, burgers, french fries, pizza, cheese, chips, or ice cream A Few Times a Week   Eat fast food (like Sypher [employer], BurClever Cloud Ricky, Taco Bell) Less Than Weekly   Eat at a buffet or sit-down restaurant Once a Week    Eat most of my meals in front of the TV or computer Almost Everyday   Often skip meals, eat at random times, have no regular eating times Less Than Weekly   Rarely sit down for a meal but snack or graze throughout Less Than Weekly   Eat extra snacks between meals A Few Times a Week   Eat most of my food at the end of the day Almost Everyday   Eat in the middle of the night or wake up at night to eat Less Than Weekly   Eat extra snacks to prevent or correct low blood sugar Never   Eat to prevent acid reflux or stomach pain Never   Worry about not having enough food to eat Never   I eat when I am depressed Less Than Weekly   I eat when I am stressed Once a Week   I eat when I am bored Once a Week   I eat when I am anxious Once a Week   I eat when I am happy or as a reward Once a Week   I feel hungry all the time even if I just have eaten Less Than Weekly   Feeling full is important to me Less Than Weekly   I finish all the food on my plate even if I am already full Almost Everyday   I can't resist eating delicious food or walk past the good food/smell A Few Times a Week   I eat/snack without noticing that I am eating Once a Week   I eat when I am preparing the meal Less Than Weekly   I eat more than usual when I see others eating Less Than Weekly   I have trouble not eating sweets, ice cream, cookies, or chips if they are around the house Almost Everyday   I think about food all day Once a Week   What foods, if any, do you crave? Chips/Crackers   Please list any other foods you crave? All of the above           5/29/2024     3:41 PM   Amount of Food   I feel out of control when eating Weekly   I eat a large amount of food, like a loaf of bread, a box of cookies, a pint/quart of ice cream, all at once Never   I eat a large amount of food even when I am not hungry Monthly   I eat rapidly Weekly   I eat alone because I feel embarrassed and do not want others to see how much I have eaten Never   I eat until I am  uncomfortably full Monthly   I feel bad, disgusted, or guilty after I overeat Monthly       Physical Activity:  Recovering from surgery right now, doing PT. Goal is to get back to golfing, walking.         5/29/2024     3:41 PM   Activity/Exercise History   How much of a typical 12 hour day do you spend sitting? Most of the Day   How much of a typical 12 hour day do you spend lying down? Less Than Half the Day   How much of a typical day do you spend walking/standing? Less Than Half the Day   How many hours (not including work) do you spend on the TV/Video Games/Computer/Tablet/Phone? 6 Hours or More   How many times a week are you active for the purpose of exercise? 4-5 TImes a Week   What keeps you from being more active? Pain    Too tired   How many total minutes do you spend doing some activity for the purpose of exercising when you exercise? 15-30 Minutes       Nutrition Prescription  Recommended energy/nutrient modification.  <1900 calories/day     Nutrition Diagnosis  Obesity r/t long history of positive energy balance aeb BMI >30.    Nutrition Intervention  Materials/education provided on hypocaloric diet for weight loss. Discussed 1900 calorie/day diet, Volumetric eating to help satiety level on fewer calories; Macronutrient distribution for optimizing satiety and blood sugar management; portion control and healthy food choices (Plate Method and Volumetrics handouts), lower calorie snack choices, meal and snack planning tips and resources. Patient demonstrates understanding.  Co-developed goals to work towards.   Provided pt with list of goals and resources below via [employer].     Expected Engagement: good  Follow-Up Plans: nutrient tracking assess     Nutrition Goals  1) Follow <1900 calorie/day plan in Lose It mariela.   Protein: 85+ gm daily (20+% of total calories)   Carb: 45-60 gm per meal, 15-30 gm per snack or less. (30-50% of total calories)   Fat: 60-85 gm daily (30-40% of total  "calories)      Quick/Easy Protein Sources:  Hard boiled eggs  Part-skim cheese sticks  Baby Bell cheese rounds  Low-fat/low-sugar Greek yogurt  Low-fat cottage cheese  Lean deli meat (chicken/turkey/ham)  Roasted chickpeas or lentils  Nuts   Turkey meat stick  Protein shake/bar  \"P3\" snack (cheese, nuts, deli meat)  Aldi's \"Protein Bread\"   \"Egglife\" egg white wrap    Tuna/salmon can/packet     Meal Replacement Shake Options:   *Protein Shake Criteria: no more than 210 Calories, at least 20 grams of protein, and less than 10 grams of sugar   Premier Protein (160 Calories, 30 g protein)  Slim Fast Advanced Nutrition (180 Calories, 20 g protein)  Muscle Milk, lactose-free, 17 oz bottle (210 Calories, 30 g protein)  Integrated Supplements, no artificial sugars (110 Calories, 20 g protein)  Boost/Ensure Max (160 calories, 30 gm protein)   Fairlife Protein Shakes (160-230 calories, 26-42 gm protein)  Aldi's Elevation Protein Powder (180 calories, 30 gm protein)   Orgain Protein Shakes (130-160 calories, 20-26 gm protein)     Meal Replacement Bar Options:  Quest Protein Bars (190 Calories, 20 g protein)  Built Bar (170 Calories, 15-20 g protein)  One Protein Bar (210 calories, 20 g protein)  Bonsall Signature Protein Bar (Costco) (190 Calories, 21 g protein)  Pure Protein Bars (180 Calories, 21 g protein)    Low Calorie Frozen Meal:  Healthy Choice Power Bowls  Lean Cuisine  Smart Ones  Behzad Monte    The Plate Method  [email or web address]    Protein Sources   [email or web address]     Carbohydrates  [email or web address]     Mindful Eating  [email or web address]     Summary of Volumetrics Eating Plan  [email or web address]     At Home Exercise Resources  ACE Fitness Library - Exercises by Muscle Group  https://www.[employer]org/resources/everyone/exercise-library/    Channels with Exercise Modifications:  mayco Alberto (strengthening) " https://www.youtube.com/@CoaMp  HASfit (strengthening): https://www.youtube.com/channel/SBCEW4-UIONe15SB-b8QXhuY  Yoga with Zelinda: https://www.Timber Ridge Fish Hatcheryube.com/@yogawithzelinda  Vml9Abzy (strengthening for any age, functional strength training, examples of exercise for seniors): https://www.youElderSense.comube.com/@siz0zuef    Handouts Relating to Exercise :    1.     Learning About Being Physically Active     2.      Muscle Conditionin Exercises    3.     Resistance Training with Free Weights: 3 Exercises    4.      Resistance Training with Surgical Tubin Exercises    5.     Stretchin Exercises    6.     Seated Exercises for Arms and Legs: 11 Exercises      Follow-Up:  6 weeks, PRN    Time spent with patient: 38 minutes.  Ann Marie Bermudez, ROSEMARIE, LD

## 2024-05-31 NOTE — PATIENT INSTRUCTIONS
"Rinku Osman,    Follow-up with RD in 6 weeks.     Thank you,    Ann Marie Bermudez, RD, LD  If you would like to schedule or reschedule an appointment with the RD, please call [phone]    Nutrition Goals  1) Follow <1900 calorie/day plan in Lose It mariela.   Protein: 85+ gm daily (20+% of total calories)   Carb: 45-60 gm per meal, 15-30 gm per snack or less. (30-50% of total calories)   Fat: 60-85 gm daily (30-40% of total calories)      Quick/Easy Protein Sources:  Hard boiled eggs  Part-skim cheese sticks  Baby Bell cheese rounds  Low-fat/low-sugar Greek yogurt  Low-fat cottage cheese  Lean deli meat (chicken/turkey/ham)  Roasted chickpeas or lentils  Nuts   Turkey meat stick  Protein shake/bar  \"P3\" snack (cheese, nuts, deli meat)  Aldi's \"Protein Bread\"   \"Egglife\" egg white wrap    Tuna/salmon can/packet     Meal Replacement Shake Options:   *Protein Shake Criteria: no more than 210 Calories, at least 20 grams of protein, and less than 10 grams of sugar   Premier Protein (160 Calories, 30 g protein)  Slim Fast Advanced Nutrition (180 Calories, 20 g protein)  Muscle Milk, lactose-free, 17 oz bottle (210 Calories, 30 g protein)  Integrated Supplements, no artificial sugars (110 Calories, 20 g protein)  Boost/Ensure Max (160 calories, 30 gm protein)   Fairlife Protein Shakes (160-230 calories, 26-42 gm protein)  Aldi's Elevation Protein Powder (180 calories, 30 gm protein)   Orgain Protein Shakes (130-160 calories, 20-26 gm protein)     Meal Replacement Bar Options:  Quest Protein Bars (190 Calories, 20 g protein)  Built Bar (170 Calories, 15-20 g protein)  One Protein Bar (210 calories, 20 g protein)  Montoya Signature Protein Bar (Costco) (190 Calories, 21 g protein)  Pure Protein Bars (180 Calories, 21 g protein)    Low Calorie Frozen Meal:  Healthy Choice Power Bowls  Lean Cuisine  Smart Ones  Behzad Monte    The Plate Method  [email or web address]    Protein Sources   [email or web address] "     Carbohydrates  [email or web address]     Mindful Eating  [email or web address]     Summary of Volumetrics Eating Plan  [email or web address]     At Home Exercise Resources  Twilio - Exercises by Muscle Group  https://www.BetterWorks (Closed).org/resources/everyone/exercise-library/    Channels with Exercise Modifications:  Coach Alberto (strengthening) https://www.Symphony Conciergeube.com/@Iris  HASfit (strengthening): https://www.Influitive.PlayPhilo.Com/channel/GKRZV4-AKOKh95YI-c3ULtfD  Yoga with Zelinda: https://www.Symphony Conciergeube.com/@yogawithzelinda  Rvt6Avjn (strengthening for any age, functional strength training, examples of exercise for seniors): https://www.Influitive.com/@qok1zjbz    Handouts Relating to Exercise :    1.     Learning About Being Physically Active     2.      Muscle Conditionin Exercises    3.     Resistance Training with Free Weights: 3 Exercises    4.      Resistance Training with Surgical Tubin Exercises    5.     Stretchin Exercises    6.     Seated Exercises for Arms and Legs: 11 Exercises      COMPREHENSIVE WEIGHT MANAGEMENT PROGRAM  VIRTUAL SUPPORT GROUPS    At [facility], our Comprehensive Weight Management program offers on-line support groups for patients who are working on weight loss and considering, preparing for, or have had weight loss surgery.     There is no cost for this opportunity.  You are invited to attend the?Virtual Support Groups?provided by any of the following locations:    [facility] via ZarthCode Teams with Simi Leon RN  2.   [place] via ZarthCode Teams with Anthony Fermin, PhD, LP  3.   [place] via ZarthCode Teams with Meri Cleary RN  4.   [facility] via a Zoom Meeting with CAROLYN Yates    The following Support Group information can also be found on our website:  [email or web address]/[email or web address]/weight-loss-[email or web address]-weight-loss-surgery-support-groups      [facility] Weight Loss Surgery  "Support Group  The support group is a patient-lead forum that meets monthly to share experiences, encouragement and education. It is open to those who have had weight loss surgery, are scheduled for surgery, or are considering surgery.   WHEN: This group meets on the 3rd Wednesday of each month from 5:00PM - 6:00PM virtually using Microsoft Teams.   FACILITATOR: Led by Simi Jefferson RD, LD, RN, the program's Clinical Coordinator.   TO REGISTER: Please contact the clinic via POWWOW or call the nurse line directly at [phone] to inform our staff that you would like an invite sent to you and to let us know the email you would like the invite sent to. Prior to the meeting, a link with directions on how to join the meeting will be sent to you.    2023 and 2024 Meetings   December 20  January 17  February 21  March 20  April 17  May 15  Zoë 19      [facility] Bariatric Care Support Group?  This is open to all pre- and post- operative bariatric surgery patients as well as their support system.   WHEN: This group meets the 3rd Tuesday of each month from 6:30 PM - 8:00 PM virtually using Microsoft Teams.   FACILITATOR: Led by Anthony Fermin, Ph.D who is a Licensed Psychologist with the [facility] Comprehensive Weight Management Program.   TO REGISTER: Please send an email to Anthony Fermin, Ph.D., LP at?[email or web address]?if you would like an invitation to the group. Prior to the meeting, a link with directions on how to join the meeting will be sent to you.    2023 and 2024 Meetings  December 19 January 16: \"Medication Management and Bariatric Surgery\", Bobbi Masterson, PharmD, Pharmacy Resident at [facility]  February 20: \"A Bariatric Surgery Patient's Perspective\", RENETTA Cho, [facility], Behavioral Health Clinician at [facility]  March 19  April 16  May 21  Zoë 18: \"Nutritional Labeling\", Dietitian " "speaker to be announced.  November 19: \"Holiday Eating\", Dietitian speaker to be announced.    [facility] Post-Operative Bariatric Surgery Support Group  This is a support group for [facility] bariatric patients (and those external to [facility]) who have had bariatric surgery and are at least 3 months post-surgery.  WHEN: This support group meets the 4th Wednesday of the month from 11:00 AM - 12:00 PM virtually using Microsoft Teams.   FACILITATOR: Led by Certified Bariatric Nurse, Meri Cleary RN.   TO REGISTER: Please send an email to Meri at [email or web address] if you would like an invitation to the group.  Prior to the meeting, a link with directions on how to join the meeting will be sent to you.    2023 and 2024 Meetings  December 27  January 24  February 28  March 27  April 24  May 22  Zoë 26    [facility] Healthy Lifestyle Group?  This is a 60 minute virtual coaching group for those who want to lead a healthier lifestyle. Come together to set goals and overcome barriers in a supportive group environment. We will address the four pillars of health: nutrition, exercise, sleep and emotional well-being.  This group is highly recommended for those who are participating in the 24 week Healthy Lifestyle Plan and our Health Coaching sessions.  WHEN: This group meets the 1st Friday of the month, 12:30 PM - 1:30 PM online, via a zoom meeting.    FACILITATOR: Led by National Board Certified Health and , Meri Greenberg [facility].   TO REGISTER: Please call the Call Center at [phone] to register.  You will get an appointment to attend in [facility]. Fifteen minutes prior to the meeting, complete the e-check in and you will get the link to join the meeting.    There is no charge to attend this group and space is limited.     2023 and 2024 Meetings  December 1: \"Let's Talk\" (guided " "discussion on our wins and challenges)  January 5: \"New Years Vision: Manifest your Best 2024!\" (guided imagery,  journaling and discussion)  February 2: \"Let's Talk\"  March 1: \"10 Percent Happier\" by Vel Hagan (Book Bites - a guided discussion on the nuggets of wisdom from favorite wellness books, no need to read the book but highly encouraged)  April 5: \"Let's Talk\"  May 3: \"Essentialism: The Disciplined Pursuit of Less\" by Giovanny Montoya (Book Bites discussion)  June 7: \"Let's Talk\"  July 5: NO MEETING, off for the 4th of July Holiday  August 2: \"The Blue Zones, Secrets for Living a Longer Life\" by Vel Florian (Book Bites discussion)                    "

## 2024-06-03 ENCOUNTER — TELEPHONE (OUTPATIENT)
Dept: ENDOCRINOLOGY | Facility: CLINIC | Age: 59
End: 2024-06-03
Payer: COMMERCIAL

## 2024-06-03 ENCOUNTER — MYC MEDICAL ADVICE (OUTPATIENT)
Dept: PHARMACY | Facility: OTHER | Age: 59
End: 2024-06-03
Payer: COMMERCIAL

## 2024-06-03 NOTE — TELEPHONE ENCOUNTER
MTM referral from: [facility] visit (referral by provider)    MTM referral outreach attempt #2 on Zoë 3, 2024 at 11:19 AM      Outcome: Patient not reachable after several attempts, routed to Pharmacist Team/Provider as an FYI    Use hbc for the carrier/Plan on the flowsheet      ArtistForce Message Sent    Gena France  MTM

## 2024-06-04 PROBLEM — E66.813 CLASS 3 SEVERE OBESITY WITH SERIOUS COMORBIDITY AND BODY MASS INDEX (BMI) OF 40.0 TO 44.9 IN ADULT, UNSPECIFIED OBESITY TYPE (H): Status: ACTIVE | Noted: 2024-06-04

## 2024-06-04 PROBLEM — E66.01 CLASS 3 SEVERE OBESITY WITH SERIOUS COMORBIDITY AND BODY MASS INDEX (BMI) OF 40.0 TO 44.9 IN ADULT, UNSPECIFIED OBESITY TYPE (H): Status: ACTIVE | Noted: 2024-06-04

## 2024-06-04 NOTE — PATIENT INSTRUCTIONS
"Rinku Osman, it was nice to meet you today!  Thank you for allowing us the privilege of caring for you. We hope we provided you with the excellent service you deserve.   Please let us know if there is anything else we can do for you so that we can be sure you are completely satisfied with your care experience.    To ensure the quality of our services you may be receiving a patient satisfaction survey from an independent patient satisfaction monitoring company.    The greatest compliment you can give is a \"Likely to Recommend\"    Your visit was with Wendy Gonzalez PA-C today.    Instructions per today's visit:     Consider medications in the future   Get moving program referral place   Health  Q&A referral placed   Health psych referral placed    MTM pharmacist next available for possible GLP-1 start   Sophie Perez, or Carole in 3 months   Wendy Agosto in 6 months   Dietitian as needed     ___________________________________________________________________________  Important contact and scheduling information:  Please call our contact center at [phone] to schedule your next appointments.  For any nursing questions or concerns call Dione De La Torre LPN at [phone] or Estephania Gill RN at [phone]  Please call during clinic hours Monday through Friday 8:00a - 4:00p if you have questions or you can contact us via [employer] at anytime and we will reply during clinic hours.    Lab results will be communicated through My Chart or letter (if My Chart not used). Please call the clinic if you have not received communication after 1 week or if you have any questions.?  Clinic Fax: [phone]  __________________________________________________________________________    If labs were ordered today:    Please make an appointment to have them drawn at your convenience.     To schedule the Lab Appointment using [employer]:  Select \"Schedule an Appointment\"  Select \"Lab Only\"  For \"A couple of questions\", select " "\"Other\"  For \"Which locations work for you?, select the location and set up the appointment    To schedule by phone call [phone] to schedule a lab only appointment at any [facility] lab.  ___________________________________________________________________________  Work with A Health !  Virtual Sessions are Available through [facility]ics    To learn more, call to schedule a free, Health  Q&A appointment: [phone]     What is Health Coaching?  Do you know what you are supposed to do, but you just aren't doing it?  Then, HEALTH COACHING may help you!   Get unstuck and move forward with the support of a professionally trained NBC-HWC (National Board-Certified Health and ) who uses evidence-based approaches to help you move forward with healthy lifestyle changes in the areas of weight loss, stress management and overall well-being.    Health Coaches help you identify goals that will work best for you. Health Coaches provide support and encouragement with overcoming barriers and help you to find inspiration and motivation to lead a healthy lifestyle.    Option one:  Health Coaching 3-Pack; Three, 30-minute Health Coaching Visits, for $99  Visits are done virtually (phone or video)  This is a self pay service; we do not accept insurance for lauren coaching.    Option two:   The 24 week Plan; 11 Health Coaching Visits, and a 7 months subscription to [employer]-- on-demand fitness, nutrition and mindfulness classes, for $499 (employee discounts may be available). Participants will also meet regularly with a weight management Medical Provider and a Registered/Licensed Dietician.  This is a self-pay service; we do not accept insurance for health coaching.    To Schedule a free Health  Q&A appointment to learn more,  call [phone].  ____________________________________________________________________  [facility]" "[place]  Healthy Lifestyle Group    Healthy Lifestyle Group  This is a 60 minute virtual coaching group for those who want to lead a healthier lifestyle. Come together to set goals and overcome barriers in a supportive group environment. We will address the four pillars of health--nutrition, exercise, sleep and emotional well-being.  This group is highly recommended for those who are participating in the 24 week Healthy Lifestyle Plan and our Health Coaching sessions.    WHEN: This group meets the first Friday of the month, 12:30 PM - 1:30 PM online, via a zoom meeting.      FACILITATOR: Led by National Board Certified Health and , Meri Greenberg [facility].    TO REGISTER: Please call the Call Center at [phone] to register. You will get an appointment to attend in [place]. Fifteen minutes prior to the meeting, complete the e-check in and you will get the link to join the meeting.  There is no charge to attend this group and space is limited.      2023 and 2024 Meeting Topics and Dates:    November 3: Introduction to Mindfulness (Learn simple and effective mindfulness practices and how it can benefit you)    December 8: Let's Talk (guided discussion on our wins and challenges)    January 5: New Years Vision: Manifest your Best 2024! (Guided imagery,  journaling and discussion)    February 2: Let's Talk    March 1: 10 Percent Happier by Vel Hagan (Book Bites; a guided discussion on the nuggets of wisdom from favorite wellness books; no need to read the book but highly encouraged)    April 5: Let's Talk    May 3: \"Essentialism; The Disciplined Pursuit of Less by Giovanny John (book bites discussion)    June 7: Let's Talk    July 5: NO MEETING, off for the 4th of July Holiday    August 2: The Blue Zones, Secrets for Living a Longer Life by Vel Florian (book bites discussion)      If you would like bariatric surgery specific support group info please let your care team know.         Thank you,   [facility] " [place] [facility] Team        WEGOVY (semaglutide)    What is Wegovy?    Wegovy (semaglutide) injection 2.4 mg is an injectable prescription medicine used for adults with obesity (BMI ?30) or overweight (excess weight) (BMI ?27) who also have weight-related medical problems to help them lose weight and keep the weight off.    1.  Start Wegovy (semaglutide) 0.25 mg once weekly for 4 weeks, then if tolerating increase to 0.5 mg weekly for 4 weeks, then if tolerating increase to 1 mg weekly for 4 weeks, then if tolerating increase to 1.7 mg weekly for 4 weeks, then if tolerating increase to 2.4 mg weekly thereafter.    -Each Wegovy pen is a once weekly single-dose prefilled pen with a pen injector already built within the pen. Discard the Wegovy pen after use in sharps container.     2. Storage: make sure that when you get the prescription that you store the prescription in the refrigerator until it is time to use the Wegovy pen.  Once it is time to use the Wegovy pen, you can keep the pen at room temperature and it is good for up to 28 days at room temperature.     3.  Potential common side effects: nausea, headache, diarrhea, stomach upset.  If these become unmanageable or concerning symptoms, please make sure to call or mychart.    4. Wegovy should be discontinued for ?2 months prior to becoming pregnant.     Prior Authorization Process for Weight Management Medications: You are being prescribed a medication that will likely need to undergo a prior authorization.  A prior authorization is when the clinic needs to fill out a form that is sent to your insurance company to obtain coverage for that medication. The prescription will NOT automatically go to your pharmacy today if it needs a Prior Authorization. If the medication prior authorization is approved, the care team will contact you and the prescription will be released to your pharmacy. If denied, we will work to try and appeal the prior  authorization if possible. The initial prior authorization process takes up to 5-10 business days and appeals can take up to 30 days. If you do not hear from us at the end of that time, you may contact the clinic.    Go to site: Wegovy video to learn more and watch instruction videos.    For any questions or concerns please send a [employer] message to our team or call our weight management call center at [phone] during regular business hours. For questions during evenings or weekends your messages will be addressed during the next business day.  For emergencies please call 911 or seek immediate medical care.

## 2024-06-04 NOTE — ASSESSMENT & PLAN NOTE
Overweight onset after her second pregnancy at 37 years old.  Previously weight stable around 200 pounds.  Weight gain has been gradual.  Weight influenced by mental health, especially emotional eating.  Has tried to lose weight in the past 6 months to food logging with no weight loss.  Family history of weight concerns.  Wants to lose weight to be more active and feel better overall.    Discussed AOM's.:  Phentermine can be considered in the future.  Anxiety is well-controlled.  No history of hypertension.  Topiramate can be considered in the future.  No history of kidney stones or disease.  Postmenopausal.  Naltrexone can be considered in the future.  No history of liver disease.  Not taking opioids.  GLP-1 can be considered in the future.  No history of pancreatitis.  No personal or family history of M EN 2 or MTC.  Is a [employer] employee, BMI> 40.

## 2024-06-10 NOTE — TELEPHONE ENCOUNTER
MTM Referral outreach attempt #3    Outcome: sent [employer] message with ability to schedule from there, as well as offered the opportunity to call me directly for help with scheduling.

## 2024-06-13 ENCOUNTER — THERAPY VISIT (OUTPATIENT)
Dept: PHYSICAL THERAPY | Facility: CLINIC | Age: 59
End: 2024-06-13
Payer: COMMERCIAL

## 2024-06-13 DIAGNOSIS — Z98.890 STATUS POST FOOT SURGERY: Primary | ICD-10-CM

## 2024-06-13 PROCEDURE — 97112 NEUROMUSCULAR REEDUCATION: CPT | Mod: GP

## 2024-06-13 PROCEDURE — 97110 THERAPEUTIC EXERCISES: CPT | Mod: GP

## 2024-06-14 ENCOUNTER — TELEPHONE (OUTPATIENT)
Dept: PHARMACY | Facility: OTHER | Age: 59
End: 2024-06-14
Payer: COMMERCIAL

## 2024-06-14 NOTE — TELEPHONE ENCOUNTER
[facility] with our scheduling line number [phone] for patient to reschedule MTM appointment with one of our pharmacists in  MTM (Paige Edwards,Lauren Bloch, Shana Sidhu). Patient scheduled appointment through [employer] with Ward Coello who is not in the  department. Appointment with Ward has been cancelled.     Lucinda Rosa, [facility]  MTM

## 2024-06-17 ENCOUNTER — VIRTUAL VISIT (OUTPATIENT)
Dept: ENDOCRINOLOGY | Facility: CLINIC | Age: 59
End: 2024-06-17

## 2024-06-17 DIAGNOSIS — E66.3 OVERWEIGHT: Primary | ICD-10-CM

## 2024-06-17 PROCEDURE — 99207 PR NO CHARGE LOS: CPT | Mod: 95

## 2024-06-17 NOTE — PROGRESS NOTES
24 wk Q and A  Wendy Agosto, and Ann Marie [employer]ST[employer]  [employer] employee    PT will do the 24 wk plan; explained role of HC: cost and structure of 24 wk plan; pt understands the $99  Pt will check to see if thrive pass or HSA will cover the $99, or use thrive pass for andry    Will send her info today on mychart; pt first HC visit scheduled    TIME SPENT: 20 minutes    Meri Greenberg  [facility], National Board Certified Health &   Lead Health   [facility]  [email or web address]  [email or web address]   To schedule: [phone]   Gender pronouns: she/her

## 2024-06-17 NOTE — LETTER
6/17/2024       RE: Jacqui Grijalva  [address]  [place] [address]     Dear Colleague,    Thank you for referring your patient, Jacqui Grijalva, to the [facility] MANAGEMENT CLINIC [place] at [facility]. Please see a copy of my visit note below.    24 wk Q and A  Wendy Agosto, and Ann Marie [employer]  [employer] employee    PT will do the 24 wk plan; explained role of HC: cost and structure of 24 wk plan; pt understands the $99  Pt will check to see if thrive pass or HSA will cover the $99, or use thrive pass for andry    Will send her info today on mychart; pt first HC visit scheduled    TIME SPENT: 20 minutes    Meri STEWARD-[facility], National Board Certified Health &   Lead Health   [facility]  [email or web address]   To schedule: [phone]   Gender pronouns: she/her

## 2024-06-25 ASSESSMENT — ACTIVITIES OF DAILY LIVING (ADL)
PUTTING_ON_YOUR_SHOES_OR_SOCKS: NO DIFFICULTY
ROLLING_OVER_IN_BED: NO DIFFICULTY
RUNNING_ON_EVEN_GROUND: EXTREME DIFFICULTY OR UNABLE TO PERFORM ACTIVITY
LIFTING_AN_OBJECT,_LIKE_A_BAG_OF_GROCERIES_FROM_THE_FLOOR: NO DIFFICULTY
WALKING_A_MILE: NO DIFFICULTY
PERFORMING_LIGHT_ACTIVITIES_AROUND_YOUR_HOME: NO DIFFICULTY
SHOPPING: EXTREME DIFFICULTY OR UNABLE TO PERFORM ACTIVITY
GETTING_INTO_AND_OUT_OF_A_BATH: NO DIFFICULTY
PLEASE_INDICATE_YOR_PRIMARY_REASON_FOR_REFERRAL_TO_THERAPY:: ANKLE AND/OR FOOT
SITTING_FOR_1_HOUR: NO DIFFICULTY
PERFORMING_HEAVY_ACTIVITIES_AROUND_YOUR_HOME: MODERATE DIFFICULTY
WALKING_BETWEEN_ROOMS: NO DIFFICULTY
RUNNING_ON_UNEVEN_GROUND: EXTREME DIFFICULTY OR UNABLE TO PERFORM ACTIVITY
ANY_OF_YOUR_USUAL_WORK,_HOUSEWORK_OR_SCHOOL_ACTIVITIES: NO DIFFICULTY
YOUR_USUAL_HOBBIES,_RECREATIONAL_OR_SPORTING_ACTIVITIES: A LITTLE BIT OF DIFFICULTY
MAKING_SHARP_TURNS_WHILE_RUNNING_FAST: EXTREME DIFFICULTY OR UNABLE TO PERFORM ACTIVITY
GETTING_INTO_OR_OUT_OF_A_CAR: NO DIFFICULTY
SQUATTING: NO DIFFICULTY
STANDING_FOR_1_HOUR: A LITTLE BIT OF DIFFICULTY
LEFS_RAW_SCORE: 0
WALKING_2_BLOCKS: NO DIFFICULTY
LEFS_SCORE(%): 0
GOING_UP_OR_DOWN_10_STAIRS: A LITTLE BIT OF DIFFICULTY

## 2024-06-26 ENCOUNTER — THERAPY VISIT (OUTPATIENT)
Dept: PHYSICAL THERAPY | Facility: CLINIC | Age: 59
End: 2024-06-26
Payer: COMMERCIAL

## 2024-06-26 DIAGNOSIS — E66.813 CLASS 3 SEVERE OBESITY WITH SERIOUS COMORBIDITY AND BODY MASS INDEX (BMI) OF 40.0 TO 44.9 IN ADULT, UNSPECIFIED OBESITY TYPE (H): ICD-10-CM

## 2024-06-26 DIAGNOSIS — M17.31 POST-TRAUMATIC OSTEOARTHRITIS OF RIGHT KNEE: ICD-10-CM

## 2024-06-26 DIAGNOSIS — E66.01 CLASS 3 SEVERE OBESITY WITH SERIOUS COMORBIDITY AND BODY MASS INDEX (BMI) OF 40.0 TO 44.9 IN ADULT, UNSPECIFIED OBESITY TYPE (H): ICD-10-CM

## 2024-06-26 PROCEDURE — 97110 THERAPEUTIC EXERCISES: CPT | Mod: GP | Performed by: PHYSICAL THERAPIST

## 2024-06-26 PROCEDURE — 97161 PT EVAL LOW COMPLEX 20 MIN: CPT | Mod: GP | Performed by: PHYSICAL THERAPIST

## 2024-06-26 NOTE — PROGRESS NOTES
PHYSICAL THERAPY EVALUATION  Type of Visit: Evaluation             Subjective Patient reports having right knee pain and left ankle pain that are limiting her ability to exercise. She reports having a right knee medial meniscus repair 8 years ago and her knee has not felt that same since. She reports having a left ankle fusion 6 months ago and she is currently managing that condition with another physical therapist at [facility]. She also reports some upper extremity weakness throughout both arms.     She reports her right knee hurts with stairs and walking. Patient reports the right knee pain is located over medial aspect of the joint line and is rated a 2/10. Pain improves with rest.     Patient is requesting exercises to strengthen her arms to help improve her ability to lift things around her home and also play golf.      Patient also states she would like to improve her physical condition as she can fatigue quickly, especially since her activity tolerance has decreased prior to and following her ankle surgery.            Presenting condition or subjective complaint: Get Moving Program  Date of onset: 05/30/24    Relevant medical history: Arthritis; Cancer; Depression; Dizziness; History of fractures; Incontinence; Menopause; Osteoarthritis; Osteoporosis; Overweight; Sleep disorder like apnea   Dates & types of surgery: Left ankle fusion 12/27/2023    Prior diagnostic imaging/testing results: MRI; X-ray     Prior therapy history for the same diagnosis, illness or injury: Yes Currently in PT following left ankle fusion surgery on 12/27/2023    Prior Level of Function  Transfers: Independent  Ambulation: Independent  ADL: Independent    Living Environment  Social support: Alone   Type of home: Apartment/condo   Stairs to enter the home: No       Ramp: No   Stairs inside the home: No       Help at home: None  Equipment owned: Grab bars; Raised toilet seat     Employment: Yes Registered Nurse -  Administrative role, 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  Hobbies/Interests: golf, andry, walking, watching sports    Patient goals for therapy: Golf, andry, walk long distances    Pain assessment: Location: right medial knee/Ratin10     Objective   KNEE EVALUATION  PAIN: Pain Level with Use: 2/10  INTEGUMENTARY (edema, incisions): WNL  POSTURE: Standing Posture: Rounded shoulders, Forward head  Sitting Posture: Rounded shoulders, Forward head  GAIT:  Weightbearing Status: WBAT  Assistive Device(s): None  Gait Deviations: Heel strike decreased L, Push off decreased L  BALANCE/PROPRIOCEPTION: WFL  WEIGHTBEARING ALIGNMENT: WNL  NON-WEIGHTBEARING ALIGNMENT: WNL  ROM: AROM WNL    STRENGTH:  grossly 4/5 throughout right knee and hip  FLEXIBILITY: Decreased hamstrings R  SPECIAL TESTS:    Left Right   Apley's (Meniscus)  Negative    Lucho's (Meniscus)  Negative         Patellar Apprehension Test  Negative    Patella Tracking  WNL   Ligamentous Stability  Negative    Anterior Drawer (ACL)  Negative,     Posterior Drawer (PCL)  Negative,     Prone Dial Test at 30 Deg and 90 Deg (PCL/PLC)  Negative,     Valgus Stress Testing at 0 Deg and 30 Deg  Negative,     Varus Stress Testing at 0 Deg and 30 Deg   Negative,       FUNCTIONAL TESTS:   PALPATION:  moderate tenderness across the medial joint line on right knee  JOINT MOBILITY: WNL    Cervical Screen- AROM WNL throughout and pain free. Cervical myotomes grossly 4/5 bilaterally  Shoulder screen- shoulder AROM WNL throughout. Shoulder strength grossly 4/5 bilaterally     30 second sit to stand 13 reps.     Assessment & Plan   CLINICAL IMPRESSIONS  Medical Diagnosis: Class 3 severe obesity with serious comorbidity and body mass index (BMI) of 40.0 to 44.9 in adult, unspecified obesity type (H)  Post-traumatic osteoarthritis of right knee    Treatment Diagnosis: Right knee pain, physical deconditioning   Impression/Assessment: Patient is a 59 year old female with Right knee, bilateral upper  extremity weakness and physical deconditioning complaints.  The following significant findings have been identified: Pain, Decreased ROM/flexibility, Decreased strength, Impaired gait, Decreased activity tolerance, and Impaired posture. These impairments interfere with their ability to perform self care tasks, recreational activities, household chores, household mobility, and community mobility as compared to previous level of function.     Clinical Decision Making (Complexity):  Clinical Presentation: Stable/Uncomplicated  Clinical Presentation Rationale: based on medical and personal factors listed in PT evaluation  Clinical Decision Making (Complexity): Low complexity    PLAN OF CARE  Treatment Interventions:  Interventions: Neuromuscular Re-education, Therapeutic Activity, Therapeutic Exercise    Long Term Goals     PT Goal 3  Goal Identifier: ambulation  Goal Description: patient will be able to ambulate >4 miles without a rest break with 0/10 knee pain and no rest breaks  Rationale: to maximize safety and independence with performance of ADLs and functional tasks;to maximize safety and independence with self cares;to maximize safety and independence within the community  Goal Progress: can walks 1 miles with numerous rest breaks  Target Date: 10/26/24      Frequency of Treatment: 1x per month  Duration of Treatment: 4 months    Recommended Referrals to Other Professionals:   Education Assessment:   Learner/Method: Listening    Risks and benefits of evaluation/treatment have been explained.   Patient/Family/caregiver agrees with Plan of Care.     Evaluation Time:     PT Eval, Low Complexity Minutes (14904): 15       Signing Clinician: Allen Segura PT

## 2024-06-27 ENCOUNTER — THERAPY VISIT (OUTPATIENT)
Dept: PHYSICAL THERAPY | Facility: CLINIC | Age: 59
End: 2024-06-27
Payer: COMMERCIAL

## 2024-06-27 ENCOUNTER — VIRTUAL VISIT (OUTPATIENT)
Dept: ENDOCRINOLOGY | Facility: CLINIC | Age: 59
End: 2024-06-27

## 2024-06-27 DIAGNOSIS — Z98.890 STATUS POST FOOT SURGERY: Primary | ICD-10-CM

## 2024-06-27 DIAGNOSIS — E66.3 OVERWEIGHT: Primary | ICD-10-CM

## 2024-06-27 PROCEDURE — 99207 PR NO CHARGE LOS: CPT | Mod: 95

## 2024-06-27 PROCEDURE — 99207 PR MEDICAL WEIGHT MANAGEMENT PROGRAM EMPLOYEE ENROLLMENT: CPT | Mod: 95

## 2024-06-27 PROCEDURE — 97110 THERAPEUTIC EXERCISES: CPT | Mod: GP

## 2024-06-27 PROCEDURE — 97112 NEUROMUSCULAR REEDUCATION: CPT | Mod: GP

## 2024-06-27 NOTE — PROGRESS NOTES
"24 week Visit 1  Drop the $99 charge today  [employer] employee   [facility]  Msg Licha that she is getting asked to pay $60 copay and no place for me to indicate \"do not bill insurance\" She paid the $60 for the consult and needs to get reimbursed. It asks at check in for the $60 again today and clicked the pay later button.  Pt is seeing a therapist too and not sure she needs all 11 HC visits but happy to use it as check in time for goals, and maybe do some mindfulness meditaiton. Also loves guided imager and does it on her own before bed.   Startin lb  CW: 287 lb    GOALS/REMINDERS  I am feeling empowered and positive. I am tracking my diet to be accountable. I am eating protein first and feeling more satiated. I am losing weight. I am doing PT to recover from ankle fusion surgery; I am golfing and exercising more.     I will maintain my weight now.     I will pause first and ask, Do I really want this?  For parties, I will plan to have protein first and chose to have one small portion of a sweet and carb.  I will bring some of my own food to my cousins' cabin.    I will focus on making changes in these three areas so I can travel to [place] and to enjoy [place] in the Spring.  Sleep apt in Sept  PT continue  30 min cardio on Sat and  and one week day (put my fitness clothes and shoes on after work so I am ready to go) bike or walk    VISION:  I am well rested and ready for my day.  I have improved mobility from PT now. I have strong relationships with my kids, family and friends. I do work I love that is purposeful and making a difference. I have a healthy body and am losing weight. I am traveling to [place].       NEXT HEALTH  VISIT: , 9 am video visit with Meri RODRIGUEZ-[facility], National Board Certified Health &   Lead Health   [facility]  [email or web address]  [email or web address]   To schedule: [phone]   Gender " pronouns: she/her

## 2024-06-27 NOTE — LETTER
"2024       RE: Jacqui Grijalva  [address]  [place] [address]     Dear Colleague,    Thank you for referring your patient, Jacqui Grijalva, to the [facility] WE[facility] MANAGEMENT CLINIC [place] at [facility]. Please see a copy of my visit note below.    24 week Visit 1  Drop the $99 charge today  [employer] employee   [facility]  Ms Licha that she is getting asked to pay $60 copay and no place for me to indicate \"do not bill insurance\" She paid the $60 for the consult and needs to get reimbursed. It asks at check in for the $60 again today and clicked the pay later button.  Pt is seeing a therapist too and not sure she needs all 11 HC visits but happy to use it as check in time for goals, and maybe do some mindfulness meditaiton. Also loves guided imager and does it on her own before bed.   Startin lb  CW: 287 lb    GOALS/REMINDERS  I am feeling empowered and positive. I am tracking my diet to be accountable. I am eating protein first and feeling more satiated. I am losing weight. I am doing PT to recover from ankle fusion surgery; I am golfing and exercising more.     I will maintain my weight now.     I will pause first and ask, Do I really want this?  For parties, I will plan to have protein first and chose to have one small portion of a sweet and carb.  I will bring some of my own food to my cousins' cabin.    I will focus on making changes in these three areas so I can travel to [place] and to enjoy [place] in the Spring.  Sleep apt in Sept  PT continue  30 min cardio on Sat and  and one week day (put my fitness clothes and shoes on after work so I am ready to go) bike or walk    VISION:  I am well rested and ready for my day.  I have improved mobility from PT now. I have strong relationships with my kids, family and friends. I do work I love that is purposeful and making a difference. I have a healthy body and am " losing weight. I am traveling to [place].       NEXT HEALTH  VISIT: JULY 12, 9 am video visit with Meri Greenberg  [facility], National Board Certified Health &   Lead Health   [facility] Management  [email or web address]  [email or web address]   To schedule: [phone]   Gender pronouns: she/her

## 2024-07-11 ENCOUNTER — THERAPY VISIT (OUTPATIENT)
Dept: PHYSICAL THERAPY | Facility: CLINIC | Age: 59
End: 2024-07-11
Payer: COMMERCIAL

## 2024-07-11 DIAGNOSIS — Z98.890 STATUS POST FOOT SURGERY: Primary | ICD-10-CM

## 2024-07-11 PROCEDURE — 97112 NEUROMUSCULAR REEDUCATION: CPT | Mod: GP

## 2024-07-11 PROCEDURE — 97110 THERAPEUTIC EXERCISES: CPT | Mod: GP

## 2024-07-11 PROCEDURE — 97530 THERAPEUTIC ACTIVITIES: CPT | Mod: GP

## 2024-07-12 ENCOUNTER — VIRTUAL VISIT (OUTPATIENT)
Dept: ENDOCRINOLOGY | Facility: CLINIC | Age: 59
End: 2024-07-12

## 2024-07-12 DIAGNOSIS — E66.3 OVERWEIGHT: Primary | ICD-10-CM

## 2024-07-12 PROCEDURE — 99207 PR NO CHARGE LOS: CPT | Mod: 95

## 2024-07-12 NOTE — LETTER
"2024       RE: Jacqui Grijalva  [address]  [place] [address]     Dear Colleague,    Thank you for referring your patient, Jacqui Grijalva, to the [facility] WEIGHT MANAGEMENT CLINIC [place] at [facility]. Please see a copy of my visit note below.    24 week Visit 2        [employer] employee   [facility]  Ms Licha that she is getting asked to pay $60 copay and no place for me to indicate \"do not bill insurance\" She paid the $60 for the consult and needs to get reimbursed. It asks at check in for the $60 again today and clicked the pay later button.  Not seeing a therapist now; decided to focus on HC instead  Startin lb  CW: 285 lb  GOALS/REMINDERS  I am feeling empowered and positive. I am tracking my diet to be accountable. I am eating protein first and feeling more satiated. I am losing weight. I am doing PT to recover from ankle fusion surgery; I am golfing and exercising more.     will maintain my weight now.      I will pause first and ask, Do I really want this?  For parties, I will plan to have protein first and chose to have one small portion of a sweet and carb.  I will bring some of my own food to my cousins' cabin.     I will focus on making changes in these three areas so I can travel to [place] and to enjoy [place] in the Spring.  Sleep apt in Sept  PT continue  30 min cardio on Sat and  and one week day (put my fitness clothes and shoes on after work so I am ready to go) bike or walk     VISION:  I am well rested and ready for my day.  I have improved mobility from PT now. I have strong relationships with my kids, family and friends. I do work I love that is purposeful and making a difference. I have a healthy body and am losing weight. I am traveling to [place].          GOALS:  I will exercise for 210 minutes or more per week  I will track my exercise and eating on loose-it ap    REMINDERS: I will seek " support from professionals if my mood impacts my current activities.   I am stronger then I think. It is ok that it is hard. I feel better when I am done.  I am successful now; I am losing weight with exercise and transforming the way I eat.    NEXT HEALTH  VISIT: August 2, 9 am video      Meri STEWARD-[facility], National Board Certified Health &   Lead Health   [facility] Comprehensive Weight Management  [email or web address]  [email or web address]   To schedule: [phone]   Gender pronouns: she/her

## 2024-07-12 NOTE — PROGRESS NOTES
"24 week Visit 2        [employer] employee   [facility]  Msg Licha that she is getting asked to pay $60 copay and no place for me to indicate \"do not bill insurance\" She paid the $60 for the consult and needs to get reimbursed. It asks at check in for the $60 again today and clicked the pay later button.  Not seeing a therapist now; decided to focus on HC instead  Startin lb  CW: 285 lb  GOALS/REMINDERS  I am feeling empowered and positive. I am tracking my diet to be accountable. I am eating protein first and feeling more satiated. I am losing weight. I am doing PT to recover from ankle fusion surgery; I am golfing and exercising more.     will maintain my weight now.      I will pause first and ask, Do I really want this?  For parties, I will plan to have protein first and chose to have one small portion of a sweet and carb.  I will bring some of my own food to my cousins' cabin.     I will focus on making changes in these three areas so I can travel to [place] and to enjoy [place] in the Spring.  Sleep apt in Sept  PT continue  30 min cardio on Sat and  and one week day (put my fitness clothes and shoes on after work so I am ready to go) bike or walk     VISION:  I am well rested and ready for my day.  I have improved mobility from PT now. I have strong relationships with my kids, family and friends. I do work I love that is purposeful and making a difference. I have a healthy body and am losing weight. I am traveling to [place].          GOALS:  I will exercise for 210 minutes or more per week  I will track my exercise and eating on loose-it ap    REMINDERS: I will seek support from professionals if my mood impacts my current activities.   I am stronger then I think. It is ok that it is hard. I feel better when I am done.  I am successful now; I am losing weight with exercise and transforming the way I eat.    NEXT HEALTH  VISIT:  am video      Meri STEWARD-[facility], National Board " Certified Health &   Lead Health   [facility] Comprehensive Weight Management  [email or web address]  [email or web address]   To schedule: [phone]   Gender pronouns: she/her

## 2024-07-15 RX ORDER — BISACODYL 5 MG/1
TABLET, DELAYED RELEASE ORAL
Qty: 4 TABLET | Refills: 0 | Status: SHIPPED | OUTPATIENT
Start: 2024-07-15 | End: 2024-09-19

## 2024-07-15 NOTE — TELEPHONE ENCOUNTER
Attempted to contact patient in order to complete pre assessment questions.     No answer. Left message to return call to [phone] option 4    Callback communication sent via Action.    Magda López RN

## 2024-07-15 NOTE — TELEPHONE ENCOUNTER
Pre assessment completed for upcoming procedure.   (Please see previous telephone encounter notes for complete details)    Patient  returned call.       Procedure details:    Arrival time and facility location reviewed.    Pre op exam needed? N/A    Designated  policy reviewed. Instructed to have someone stay 6  hours post procedure.       Medication review:    Medications reviewed. Please see supporting documentation below. Holding recommendations discussed (if applicable).   NSAID medication(s): Ibuprofen (Advil, Motrin): HOLD 1 day before procedure.      Prep for procedure:     Procedure prep instructions reviewed.        Any additional information needed:  N/A      Patient  verbalized understanding and had no questions or concerns at this time.      Wendy Zimmer RN  Endoscopy Procedure Pre Assessment   [phone] option 4

## 2024-07-15 NOTE — TELEPHONE ENCOUNTER
Rescheduled Procedure    Pre visit planning completed.      Procedure details:    Patient scheduled for Colonoscopy  on 7/23/24.     Arrival time: 0845. Procedure time 0930    Facility location: [facility]; [address] [place] [id number]. Check in location: [facility].     Sedation type: Conscious sedation     Pre op exam needed? N/A    Indication for procedure: Screening      Chart review:     Electronic implanted devices? No    Recent diagnosis of diverticulitis within the last 6 weeks? No    Diabetic? No      Medication review:    Anticoagulants? No    NSAIDS? Yes.  Ibuprofen (Advil, Motrin).  Holding interval of 1 day before procedure.    Other medication HOLDING recommendations:  N/A      Prep for procedure:     Bowel prep recommendation: Extended Golytely. Bowel prep prescription resent to [id number] - [place], [place] - [address] [place] [address] [address]   Due to: BMI > 40.     Prep instructions sent via usha Davis RN  Endoscopy Procedure Pre Assessment RN  [phone] option 4

## 2024-07-16 NOTE — PROGRESS NOTES
"Video-Visit Details    Type of service:  Video Visit    Video Start Time: 10:33 AM   Video End Time: 11:08 AM     Originating Location (pt. Location): Home    Distant Location (provider location):  Offsite (providers home) [facility] WEIGHT MANAGEMENT CLINIC [place]     Platform used for Video Visit: EUROBOX      Weight Management Nutrition Consultation    Jacqui Grijalva is a 59 year old female presents today for return weight management nutrition consultation.  Patient referred by Wendy Gonzalez PA-C on May 30, 2024.    Patient with Co-morbidities of obesity includin/29/2024     3:41 PM   --   I have the following health issues associated with obesity Sleep Apnea    GERD (Reflux)    Stress Incontinence    Osteoarthritis (joint disease)   I have the following symptoms associated with obesity Knee Pain    Fatigue       Anthropometrics:  Initial:  Estimated body mass index is 41 kg/m  as calculated from the following:    Height as of 24: 1.803 m (5' 11\").    Weight as of 24: 133.4 kg (294 lb).    Current:  Estimated body mass index is 39.75 kg/m  as calculated from the following:    Height as of this encounter: 1.803 m (5' 11\").    Weight as of this encounter: 129.3 kg (285 lb). (-10 lbs from initial)       Medications for Weight Loss:  None     NUTRITION HISTORY  Food allergies: None  Food intolerances: spicy foods   Vitamin/Mineral Supplements: MVI   Previous methods of diet modification for weight loss: In last 6 months, started tracking intermittently in [employer] mariela. Identifies needing to moderate carb intake.   Working 8-4, M-F  Doesn't love to cook  Tries not to drink calories  Identifies over eating at night. Comfort eating - stress management, reward.   Pt goals - decrease carb intake, decrease binge eating, healthier swaps for snacking     Today - Doing very well with diet change. Consistently tracking in Lose It mariela. Average calorie intake of 1600 tamie/day, 85 gm protein/day, 16 gm " fiber/day.  If dining out, looking for healthier options.  Significantly reduced snacking, also if having portioning out and much more conscious.    Significantly increased exercise  Potential challenges - needing more variety, higher cost of protein foods    Recent Diet Recall (Work days M-F):  Breakfast: sugar-free energy drink and Pure/Atkins protein bar; occ a protein shake; cheese stick; yogurt   Lunch: 12 pm cafeteria at work - Grain bowl; salad; soup; burrito bowl   Occ piece of chocolate   Dinner: higher protein frozen meals;   Snacks: more during the evening, and on the wknd - cookies and crackers   Beverages: Water, Celsius, black iced coffee, unsweet iced tea.  Alcohol: two glass wine per week   Dining Out: Once weekly dining out for dinner.     Progress Towards Previous Goals:  1) Follow <1900 calorie/day plan in [employer] It mariela. - Met, continues. Reports consuming an average of 7049-2300 tamie/day, 1600 tamie/day  Protein: 85+ gm daily (20+% of total calories) - Met, continue to meet protein and helping to keep her feeling fed.   Carb: 45-60 gm per meal, 15-30 gm per snack or less. (30-50% of total calories)   Fat: 60-85 gm daily (30-40% of total calories)    Physical Activity:  Exercise: 210-315 mins per week - PT for ankle, biking, walking, golfing, WellBeats exercises, little of weight lifting.      Nutrition Prescription  Recommended energy/nutrient modification.  <1600 calories/day     Nutrition Diagnosis  Obesity r/t long history of positive energy balance aeb BMI >30. - Improving    Nutrition Intervention  Materials/education provided on hypocaloric diet for weight loss.   Reviewed progress towards previous goals.   Discussed new calorie goal of 1600 calorie/day for continued weight loss.   Discussed increasing fiber to 25 gm daily.   Discussed snack ideas, lower cost nutrition resources and shopping.   Planning to start GLP1, discussed nutrition considerations.   Co-developed goals to work towards.  "  Provided pt with list of goals and resources below via Celotor.     Expected Engagement: good  Follow-Up Plans: pt would like to discuss supplements    Nutrition Goals  1) Follow <1600 calorie/day plan in Lose It wilson.   Protein: 85+ gm daily (20+% of total calories)   Carb: 45-60 gm per meal, 15-30 gm per snack or less. (30-50% of total calories)   Fat: 60-85 gm daily (30-40% of total calories)  Fiber: 25+ gm     Snack Ideas:  Low-fat cottage cheese with everything bagel seasoning and sliced veggies  Chomps Turkey meat sticks      Care-n-Share,Eat Smart (Wilson)  Recipe ideas that are suppose to be more affordable  Calculator that allows you to compare product prices   https://Krimmeni Technologies.extension.[facility]     MetroMile (Online)  Get organic produce and sustainably sourced groceries delivered at up to 40% off grocery store prices.  https://www.Panasas.stylefruits        Quick/Easy Protein Sources:  Hard boiled eggs  Part-skim cheese sticks  Baby Bell cheese rounds  Low-fat/low-sugar Greek yogurt  Low-fat cottage cheese  Lean deli meat (chicken/turkey/ham)  Roasted chickpeas or lentils  Nuts   Turkey meat stick  Protein shake/bar  \"P3\" snack (cheese, nuts, deli meat)  Aldi's \"Protein Bread\"   \"Egglife\" egg white wrap    Tuna/salmon can/packet     Meal Replacement Shake Options:   *Protein Shake Criteria: no more than 210 Calories, at least 20 grams of protein, and less than 10 grams of sugar   Premier Protein (160 Calories, 30 g protein)  Slim Fast Advanced Nutrition (180 Calories, 20 g protein)  Muscle Milk, lactose-free, 17 oz bottle (210 Calories, 30 g protein)  Integrated Supplements, no artificial sugars (110 Calories, 20 g protein)  Boost/Ensure Max (160 calories, 30 gm protein)   Fairlife Protein Shakes (160-230 calories, 26-42 gm protein)  Aldi's Elevation Protein Powder (180 calories, 30 gm protein)   Orgain Protein Shakes (130-160 calories, 20-26 gm protein)     Meal Replacement Bar Options:  Quest " Protein Bars (190 Calories, 20 g protein)  Built Bar (170 Calories, 15-20 g protein)  One Protein Bar (210 calories, 20 g protein)  Montoya Signature Protein Bar (Costco) (190 Calories, 21 g protein)  Pure Protein Bars (180 Calories, 21 g protein)    Low Calorie Frozen Meal:  Healthy Choice Power Bowls  Lean Cuisine  Smart Ones  Behzad Boswellvicente    The Plate Method  [email or web address]    Protein Sources   [email or web address]://[email or web address] [email or web address]     Carbohydrates  [email or web address]     Mindful Eating  [email or web address]://Avenda [email or web address]     Summary of Volumetrics Eating Plan  [email or web address]     At Home Exercise Resources  [employer] Library - Exercises by Muscle Group  https://www.Spredfast.org/resources/everyone/exercise-library/    Channels with Exercise Modifications:  Coach Alberto (strengthening) https://www.Skywordube.com/@Iris  HASfit (strengthening): https://www.BridgeXs.com/channel/RXUSZ7-INTIo10CT-e3MBvfF  Yoga with Zelinda: https://www.Skywordube.com/@yogawithzelinda  Zes6Rmrb (strengthening for any age, functional strength training, examples of exercise for seniors): https://www.Skywordube.com/@caq5tjcc    Handouts Relating to Exercise :    1.     Learning About Being Physically Active     2.      Muscle Conditionin Exercises    3.     Resistance Training with Free Weights: 3 Exercises    4.      Resistance Training with Surgical Tubin Exercises    5.     Stretchin Exercises    6.     Seated Exercises for Arms and Legs: 11 Exercises            Follow-Up:  9/10/24    Time spent with patient: 35 minutes.  Ann Marie Bermudez RD, LD

## 2024-07-18 ENCOUNTER — VIRTUAL VISIT (OUTPATIENT)
Dept: ENDOCRINOLOGY | Facility: CLINIC | Age: 59
End: 2024-07-18
Payer: COMMERCIAL

## 2024-07-18 VITALS — HEIGHT: 71 IN | BODY MASS INDEX: 39.9 KG/M2 | WEIGHT: 285 LBS

## 2024-07-18 DIAGNOSIS — Z71.3 NUTRITIONAL COUNSELING: Primary | ICD-10-CM

## 2024-07-18 DIAGNOSIS — E66.01 CLASS 3 SEVERE OBESITY WITH SERIOUS COMORBIDITY AND BODY MASS INDEX (BMI) OF 40.0 TO 44.9 IN ADULT, UNSPECIFIED OBESITY TYPE (H): ICD-10-CM

## 2024-07-18 DIAGNOSIS — E66.813 CLASS 3 SEVERE OBESITY WITH SERIOUS COMORBIDITY AND BODY MASS INDEX (BMI) OF 40.0 TO 44.9 IN ADULT, UNSPECIFIED OBESITY TYPE (H): ICD-10-CM

## 2024-07-18 PROCEDURE — 99207 PR NO CHARGE LOS: CPT | Mod: 95 | Performed by: DIETITIAN, REGISTERED

## 2024-07-18 PROCEDURE — 97803 MED NUTRITION INDIV SUBSEQ: CPT | Mod: 95 | Performed by: DIETITIAN, REGISTERED

## 2024-07-18 ASSESSMENT — PAIN SCALES - GENERAL: PAINLEVEL: NO PAIN (0)

## 2024-07-18 NOTE — LETTER
"2024       RE: Jacqui Grijalva  [address]  [place] [address]     Dear Colleague,    Thank you for referring your patient, Jacqui Grijalva, to the [facility] WEIGHT MANAGEMENT CLINIC [place] at [facility]. Please see a copy of my visit note below.    Video-Visit Details    Type of service:  Video Visit    Video Start Time: 10:33 AM   Video End Time: 11:08 AM     Originating Location (pt. Location): Home    Distant Location (provider location):  Offsite (providers home) [facility] WEIGHT MANAGEMENT CLINIC [place]     Platform used for Video Visit: [employer]      Weight Management Nutrition Consultation    Jacqui Grijalva is a 59 year old female presents today for return weight management nutrition consultation.  Patient referred by Wendy Gonzalez PA-C on May 30, 2024.    Patient with Co-morbidities of obesity includin/29/2024     3:41 PM   --   I have the following health issues associated with obesity Sleep Apnea    GERD (Reflux)    Stress Incontinence    Osteoarthritis (joint disease)   I have the following symptoms associated with obesity Knee Pain    Fatigue       Anthropometrics:  Initial:  Estimated body mass index is 41 kg/m  as calculated from the following:    Height as of 24: 1.803 m (5' 11\").    Weight as of 24: 133.4 kg (294 lb).    Current:  Estimated body mass index is 39.75 kg/m  as calculated from the following:    Height as of this encounter: 1.803 m (5' 11\").    Weight as of this encounter: 129.3 kg (285 lb). (-10 lbs from initial)       Medications for Weight Loss:  None     NUTRITION HISTORY  Food allergies: None  Food intolerances: spicy foods   Vitamin/Mineral Supplements: MVI   Previous methods of diet modification for weight loss: In last 6 months, started tracking intermittently in [employer] mariela. Identifies needing to moderate carb intake.   Working 8-4, M-F  Doesn't love to " cook  Tries not to drink calories  Identifies over eating at night. Comfort eating - stress management, reward.   Pt goals - decrease carb intake, decrease binge eating, healthier swaps for snacking     Today - Doing very well with diet change. Consistently tracking in [employer] It mariela. Average calorie intake of 1600 tamie/day, 85 gm protein/day, 16 gm fiber/day.  If dining out, looking for healthier options.  Significantly reduced snacking, also if having portioning out and much more conscious.    Significantly increased exercise  Potential challenges - needing more variety, higher cost of protein foods    Recent Diet Recall (Work days M-F):  Breakfast: sugar-free energy drink and Pure/Atkins protein bar; occ a protein shake; cheese stick; yogurt   Lunch: 12 pm cafeteria at work - Grain bowl; salad; soup; burrito bowl   Occ piece of chocolate   Dinner: higher protein frozen meals;   Snacks: more during the evening, and on the wknd - cookies and crackers   Beverages: Water, Celsius, black iced coffee, unsweet iced tea.  Alcohol: two glass wine per week   Dining Out: Once weekly dining out for dinner.     Progress Towards Previous Goals:  1) Follow <1900 calorie/day plan in Lose It mariela. - Met, continues. Reports consuming an average of 4832-8568 tamie/day, 1600 tamie/day  Protein: 85+ gm daily (20+% of total calories) - Met, continue to meet protein and helping to keep her feeling fed.   Carb: 45-60 gm per meal, 15-30 gm per snack or less. (30-50% of total calories)   Fat: 60-85 gm daily (30-40% of total calories)    Physical Activity:  Exercise: 210-315 mins per week - PT for ankle, biking, walking, golfing, WellBeats exercises, little of weight lifting.      Nutrition Prescription  Recommended energy/nutrient modification.  <1600 calories/day     Nutrition Diagnosis  Obesity r/t long history of positive energy balance aeb BMI >30. - Improving    Nutrition Intervention  Materials/education provided on hypocaloric diet for  "weight loss.   Reviewed progress towards previous goals.   Discussed new calorie goal of 1600 calorie/day for continued weight loss.   Discussed increasing fiber to 25 gm daily.   Discussed snack ideas, lower cost nutrition resources and shopping.   Planning to start GLP1, discussed nutrition considerations.   Co-developed goals to work towards.   Provided pt with list of goals and resources below via [employer].     Expected Engagement: good  Follow-Up Plans: pt would like to discuss supplements    Nutrition Goals  1) Follow <1600 calorie/day plan in Lose It wilson.   Protein: 85+ gm daily (20+% of total calories)   Carb: 45-60 gm per meal, 15-30 gm per snack or less. (30-50% of total calories)   Fat: 60-85 gm daily (30-40% of total calories)  Fiber: 25+ gm     Snack Ideas:  Low-fat cottage cheese with everything bagel seasoning and sliced veggies  Chomps Turkey meat sticks      Ngt4u.inc,Eat Smart (Wilson)  Recipe ideas that are suppose to be more affordable  Calculator that allows you to compare product prices   https://Milestone Pharmaceuticals.extension.[facility]     Black Raven and Stag (Online)  Get organic produce and sustainably sourced groceries delivered at up to 40% off grocery store prices.  https://www.Gather App.Granite Horizon        Quick/Easy Protein Sources:  Hard boiled eggs  Part-skim cheese sticks  Baby Bell cheese rounds  Low-fat/low-sugar Greek yogurt  Low-fat cottage cheese  Lean deli meat (chicken/turkey/ham)  Roasted chickpeas or lentils  Nuts   Turkey meat stick  Protein shake/bar  \"P3\" snack (cheese, nuts, deli meat)  Aldi's \"Protein Bread\"   \"Egglife\" egg white wrap    Tuna/salmon can/packet     Meal Replacement Shake Options:   *Protein Shake Criteria: no more than 210 Calories, at least 20 grams of protein, and less than 10 grams of sugar   Premier Protein (160 Calories, 30 g protein)  Slim Fast Advanced Nutrition (180 Calories, 20 g protein)  Muscle Milk, lactose-free, 17 oz bottle (210 Calories, 30 g " protein)  Integrated Supplements, no artificial sugars (110 Calories, 20 g protein)  Boost/Ensure Max (160 calories, 30 gm protein)   Fairlife Protein Shakes (160-230 calories, 26-42 gm protein)  Aldi's Elevation Protein Powder (180 calories, 30 gm protein)   Orgain Protein Shakes (130-160 calories, 20-26 gm protein)     Meal Replacement Bar Options:  Quest Protein Bars (190 Calories, 20 g protein)  Built Bar (170 Calories, 15-20 g protein)  One Protein Bar (210 calories, 20 g protein)  Harbert Signature Protein Bar (Costco) (190 Calories, 21 g protein)  Pure Protein Bars (180 Calories, 21 g protein)    Low Calorie Frozen Meal:  Healthy Choice Power Bowls  Lean Cuisine  Smart Ones  Behzad Carlos Delights    The Plate Method  [email or web address]    Protein Sources   http://Affinimark Technologies/[email or web address]     Carbohydrates  [email or web address]     Mindful Eating  http://Affinimark [email or web address]     Summary of Volumetrics Eating Plan  [email or web address]     At Home Exercise Resources  [employer] Library - Exercises by Muscle Group  https://www.Corrupt Lace.org/resources/everyone/exercise-library/    Channels with Exercise Modifications:  Coach Alberto (strengthening) https://www.L2 Environmental Servicesube.com/@Iris  HASfit (strengthening): https://www.L2 Environmental Servicesube.com/channel/OMUOA2-LZZFw37XL-v3TIgtF  Yoga with Zelinda: https://www.L2 Environmental Servicesube.com/@yogawithzelinda  Bei3Oimn (strengthening for any age, functional strength training, examples of exercise for seniors): https://www.youGroom Energy Solutionsube.com/@cxc5tkhn    Handouts Relating to Exercise :    1.     Learning About Being Physically Active     2.      Muscle Conditionin Exercises    3.     Resistance Training with Free Weights: 3 Exercises    4.      Resistance Training with Surgical Tubin Exercises    5.     Stretchin Exercises    6.     Seated Exercises for Arms and Legs: 11 Exercises            Follow-Up:  9/10/24    Time spent with patient: 35 minutes.  Ann Marie Bermudez  ROSEMARIE, CORINE

## 2024-07-18 NOTE — NURSING NOTE
Current patient location: [address]   [place] [address]    Is the patient currently in the state of [place]? YES    Visit mode:VIDEO    If the visit is dropped, the patient can be reconnected by: VIDEO VISIT: Text to cell phone:   Telephone Information:   Mobile [phone]       Will anyone else be joining the visit? NO  (If patient encounters technical issues they should call [phone]    How would you like to obtain your AVS? MyChart    Are changes needed to the allergy or medication list? No    Are refills needed on medications prescribed by this physician? NO    Reason for visit: GITA KRAMER

## 2024-07-18 NOTE — PATIENT INSTRUCTIONS
"Rinku Osman,    Follow-up with RD on 9/10    Thank you,    Ann Marie Bermudez, RD, LD  If you would like to schedule or reschedule an appointment with the RD, please call [phone]    Nutrition Goals  1) Follow <1600 calorie/day plan in Lose It wilson.   Protein: 85+ gm daily (20+% of total calories)   Carb: 45-60 gm per meal, 15-30 gm per snack or less. (30-50% of total calories)   Fat: 60-85 gm daily (30-40% of total calories)  Fiber: 25+ gm     Snack Ideas:  Low-fat cottage cheese with everything bagel seasoning and sliced veggies  Chomps Turkey meat sticks      Medium,Eat Smart ([place])  Recipe ideas that are suppose to be more affordable  Calculator that allows you to compare product prices   [email or web address]     Attune (Online)  Get organic produce and sustainably sourced groceries delivered at up to 40% off grocery store prices.  https://www.AppLift.Validus        Quick/Easy Protein Sources:  Hard boiled eggs  Part-skim cheese sticks  Baby Bell cheese rounds  Low-fat/low-sugar Greek yogurt  Low-fat cottage cheese  Lean deli meat (chicken/turkey/ham)  Roasted chickpeas or lentils  Nuts   Turkey meat stick  Protein shake/bar  \"P3\" snack (cheese, nuts, deli meat)  Aldi's \"Protein Bread\"   \"Egglife\" egg white wrap    Tuna/salmon can/packet     Meal Replacement Shake Options:   *Protein Shake Criteria: no more than 210 Calories, at least 20 grams of protein, and less than 10 grams of sugar   Premier Protein (160 Calories, 30 g protein)  Slim Fast Advanced Nutrition (180 Calories, 20 g protein)  Muscle Milk, lactose-free, 17 oz bottle (210 Calories, 30 g protein)  Integrated Supplements, no artificial sugars (110 Calories, 20 g protein)  Boost/Ensure Max (160 calories, 30 gm protein)   Fairlife Protein Shakes (160-230 calories, 26-42 gm protein)  Aldi's Elevation Protein Powder (180 calories, 30 gm protein)   Orgain Protein Shakes (130-160 calories, 20-26 gm protein)     Meal Replacement " Bar Options:  Quest Protein Bars (190 Calories, 20 g protein)  Built Bar (170 Calories, 15-20 g protein)  One Protein Bar (210 calories, 20 g protein)  Montoya Signature Protein Bar ([employer]co) (190 Calories, 21 g protein)  Pure Protein Bars (180 Calories, 21 g protein)    Low Calorie Frozen Meal:  Healthy Choice Power Bowls  Lean Cuisine  Smart Ones  Behzad Monte    The Plate Method  [email or web address]    Protein Sources   [email or web address]     Carbohydrates  [email or web address]     Mindful Eating  [email or web address]     Summary of Volumetrics Eating Plan  [email or web address]     At Home Exercise Resources  Mobi Tech Library - Exercises by Muscle Group  https://www.Binary Thumb.org/resources/everyone/exercise-library/    Channels with Exercise Modifications:  Coach Alberto (strengthening) https://www.Infusionsoftube.com/@Iris  HASfit (strengthening): https://www.LiveHive Systems.com/channel/SBWAX3-QPZNn04SC-u7PRugV  Yoga with Zelinda: https://www.Infusionsoftube.com/@yogawithzelinda  Siw4Liom (strengthening for any age, functional strength training, examples of exercise for seniors): https://www.Infusionsoftube.com/@zju5xtug    Handouts Relating to Exercise :    1.     Learning About Being Physically Active     2.      Muscle Conditionin Exercises    3.     Resistance Training with Free Weights: 3 Exercises    4.      Resistance Training with Surgical Tubin Exercises    5.     Stretchin Exercises    6.     Seated Exercises for Arms and Legs: 11 Exercises      COMPREHENSIVE WEIGHT MANAGEMENT PROGRAM  VIRTUAL SUPPORT GROUPS    At [facility], our Comprehensive Weight Management program offers on-line support groups for patients who are working on weight loss and considering, preparing for, or have had weight loss surgery.     There is no cost for this opportunity.  You are invited to attend the?Virtual Support Groups?provided by any of the following  locations:    [facility] via Microsoft Teams with Simi Leon RN  2.   [place] via SSN Funding with Anthony Fermin, PhD, LP  3.   [place] via SSN Funding with Meri Cleary RN  4.   [facility] via a Zoom Meeting with ROSA Yates    The following Support Group information can also be found on our website:  [email or web address]://www.[email or web address].[email or web address]/treatments/weight-loss-and-weight-loss-surgery-support-groups      [facility] Weight Loss Surgery Support Group  The support group is a patient-lead forum that meets monthly to share experiences, encouragement and education. It is open to those who have had weight loss surgery, are scheduled for surgery, or are considering surgery.   WHEN: This group meets on the 3rd Wednesday of each month from 5:00PM - 6:00PM virtually using Microsoft Teams.   FACILITATOR: Led by Simi Jefferson RD, CORINE, RN, the program's Clinical Coordinator.   TO REGISTER: Please contact the clinic via [place] or call the nurse line directly at [phone] to inform our staff that you would like an invite sent to you and to let us know the email you would like the invite sent to. Prior to the meeting, a link with directions on how to join the meeting will be sent to you.    2023 and 2024 Meetings   December 20  January 17  February 21  March 20  April 17  May 15  Zoë 19      [facility] Bariatric Care Support Group?  This is open to all pre- and post- operative bariatric surgery patients as well as their support system.   WHEN: This group meets the 3rd Tuesday of each month from 6:30 PM - 8:00 PM virtually using Microsoft Teams.   FACILITATOR: Led by Anthony Fermin, Ph.D who is a Licensed Psychologist with the [facility] Comprehensive Weight Management Program.   TO REGISTER: Please send an email to Anthony Fermin, Ph.D., LP [email or web address]?if you would like an invitation to the group.  "Prior to the meeting, a link with directions on how to join the meeting will be sent to you.    2023 and 2024 Meetings  December 19 January 16: \"Medication Management and Bariatric Surgery\", Bobbi Masterson, PharmD, Pharmacy Resident at [facility]  February 20: \"A Bariatric Surgery Patient's Perspective\", RENETTA Cho, [facility], Behavioral Health Clinician at [facility]  March 19  April 16  May 21  Zoë 18: \"Nutritional Labeling\", Dietitian speaker to be announced.  November 19: \"Holiday Eating\", Dietitian speaker to be announced.    [facility] and [facility] Post-Operative Bariatric Surgery Support Group  This is a support group for [facility] bariatric patients (and those external to [facility]) who have had bariatric surgery and are at least 3 months post-surgery.  WHEN: This support group meets the 4th Wednesday of the month from 11:00 AM - 12:00 PM virtually using Microsoft Teams.   FACILITATOR: Led by Certified Bariatric Nurse, Meri Cleary RN.   TO REGISTER: Please send an email to Meri at [email or web address] if you would like an invitation to the group.  Prior to the meeting, a link with directions on how to join the meeting will be sent to you.    2023 and 2024 Meetings  December 27  January 24  February 28  March 27  April 24  May 22  Zoë 26    [facility] Healthy Lifestyle Group?  This is a 60 minute virtual coaching group for those who want to lead a healthier lifestyle. Come together to set goals and overcome barriers in a supportive group environment. We will address the four pillars of health: nutrition, exercise, sleep and emotional well-being.  This group is highly recommended for those who are participating in the 24 week Healthy Lifestyle Plan and our Health Coaching sessions.  WHEN: This group meets the 1st Friday of the month, 12:30 PM - 1:30 " "PM online, via a zoom meeting.    FACILITATOR: Led by National Board Certified Health and , Meri Greenberg, [facility].   TO REGISTER: Please call the Call Center at [phone] to register.  You will get an appointment to attend in [facility]. Fifteen minutes prior to the meeting, complete the e-check in and you will get the link to join the meeting.    There is no charge to attend this group and space is limited.     2023 and 2024 Meetings  December 1: \"Let's Talk\" (guided discussion on our wins and challenges)  January 5: \"New Years Vision: Manifest your Best 2024!\" (guided imagery,  journaling and discussion)  February 2: \"Let's Talk\"  March 1: \"10 Percent Happier\" by Vel Hagan (Book Bites - a guided discussion on the nuggets of wisdom from favorite wellness books, no need to read the book but highly encouraged)  April 5: \"Let's Talk\"  May 3: \"Essentialism: The Disciplined Pursuit of Less\" by Giovanny Montoya (Book Bites discussion)  June 7: \"Let's Talk\"  July 5: NO MEETING, off for the 4th of July Holiday  August 2: \"The Blue Zones, Secrets for Living a Longer Life\" by Vel Florian (Book Bites discussion)                    "

## 2024-07-23 ENCOUNTER — HOSPITAL ENCOUNTER (OUTPATIENT)
Facility: CLINIC | Age: 59
Discharge: HOME | End: 2024-07-23
Attending: COLON & RECTAL SURGERY | Admitting: COLON & RECTAL SURGERY
Payer: COMMERCIAL

## 2024-07-23 VITALS
RESPIRATION RATE: 14 BRPM | OXYGEN SATURATION: 99 % | DIASTOLIC BLOOD PRESSURE: 78 MMHG | HEART RATE: 73 BPM | SYSTOLIC BLOOD PRESSURE: 141 MMHG

## 2024-07-23 LAB — COLONOSCOPY: NORMAL

## 2024-07-23 PROCEDURE — G0500 MOD SEDAT ENDO SERVICE >5YRS: HCPCS | Performed by: COLON & RECTAL SURGERY

## 2024-07-23 PROCEDURE — 88305 TISSUE EXAM BY PATHOLOGIST: CPT | Mod: TC | Performed by: COLON & RECTAL SURGERY

## 2024-07-23 PROCEDURE — 45385 COLONOSCOPY W/LESION REMOVAL: CPT | Mod: PT | Performed by: COLON & RECTAL SURGERY

## 2024-07-23 PROCEDURE — 99153 MOD SED SAME PHYS/QHP EA: CPT | Mod: PT | Performed by: COLON & RECTAL SURGERY

## 2024-07-23 PROCEDURE — 45381 COLONOSCOPY SUBMUCOUS NJX: CPT | Mod: PT | Performed by: COLON & RECTAL SURGERY

## 2024-07-23 PROCEDURE — 250N000013 HC RX MED GY IP 250 OP 250 PS 637: Performed by: COLON & RECTAL SURGERY

## 2024-07-23 PROCEDURE — 250N000011 HC RX IP 250 OP 636: Performed by: COLON & RECTAL SURGERY

## 2024-07-23 RX ORDER — FENTANYL CITRATE 50 UG/ML
INJECTION, SOLUTION INTRAMUSCULAR; INTRAVENOUS PRN
Status: DISCONTINUED | OUTPATIENT
Start: 2024-07-23 | End: 2024-07-23 | Stop reason: HOSPADM

## 2024-07-23 RX ORDER — NALOXONE HYDROCHLORIDE 0.4 MG/ML
0.2 INJECTION, SOLUTION INTRAMUSCULAR; INTRAVENOUS; SUBCUTANEOUS
Status: CANCELLED | OUTPATIENT
Start: 2024-07-23

## 2024-07-23 RX ORDER — NALOXONE HYDROCHLORIDE 0.4 MG/ML
0.4 INJECTION, SOLUTION INTRAMUSCULAR; INTRAVENOUS; SUBCUTANEOUS
Status: CANCELLED | OUTPATIENT
Start: 2024-07-23

## 2024-07-23 RX ORDER — ONDANSETRON 2 MG/ML
4 INJECTION INTRAMUSCULAR; INTRAVENOUS EVERY 6 HOURS PRN
Status: CANCELLED | OUTPATIENT
Start: 2024-07-23

## 2024-07-23 RX ORDER — ONDANSETRON 2 MG/ML
4 INJECTION INTRAMUSCULAR; INTRAVENOUS
Status: DISCONTINUED | OUTPATIENT
Start: 2024-07-23 | End: 2024-07-23 | Stop reason: HOSPADM

## 2024-07-23 RX ORDER — LIDOCAINE 40 MG/G
CREAM TOPICAL
Status: DISCONTINUED | OUTPATIENT
Start: 2024-07-23 | End: 2024-07-23 | Stop reason: HOSPADM

## 2024-07-23 RX ORDER — FLUMAZENIL 0.1 MG/ML
0.2 INJECTION, SOLUTION INTRAVENOUS
Status: CANCELLED | OUTPATIENT
Start: 2024-07-23 | End: 2024-07-23

## 2024-07-23 RX ORDER — SIMETHICONE 40MG/0.6ML
SUSPENSION, DROPS(FINAL DOSAGE FORM)(ML) ORAL PRN
Status: DISCONTINUED | OUTPATIENT
Start: 2024-07-23 | End: 2024-07-23 | Stop reason: HOSPADM

## 2024-07-23 RX ORDER — ONDANSETRON 4 MG/1
4 TABLET, ORALLY DISINTEGRATING ORAL EVERY 6 HOURS PRN
Status: CANCELLED | OUTPATIENT
Start: 2024-07-23

## 2024-07-23 RX ORDER — PROCHLORPERAZINE MALEATE 10 MG
10 TABLET ORAL EVERY 6 HOURS PRN
Status: CANCELLED | OUTPATIENT
Start: 2024-07-23

## 2024-07-23 ASSESSMENT — ACTIVITIES OF DAILY LIVING (ADL)
ADLS_ACUITY_SCORE: 33
ADLS_ACUITY_SCORE: 35

## 2024-07-23 NOTE — H&P
Pre-Endoscopy History and Physical   Jacqui Grijalva MRN# [id number]   YOB: 1965 Age: 59 year old   Date of Procedure: 7/23/2024   Primary care provider: Bijal Browne   Type of Endoscopy: colonoscopy   Reason for Procedure: Screening   Type of Anesthesia Anticipated: Moderate Sedation   HPI:   Jacqui is a 59 year old female for screening colonoscopy.  She has never had a colonoscopy before.  She denies BRBPR, abdominal pain, nausea/vomiting, changes in bowel habits or unintentional weight loss.  She denies a Fh of CRC.    Allergies   Allergen Reactions    Pcn [Penicillins]       Prior to Admission Medications   Prescriptions Last Dose Informant Patient Reported? Taking?   IBUPROFEN PO 7/17/2024  Yes No   acetaminophen (TYLENOL) 500 MG tablet 7/20/2024  Yes No   Sig: Take 500-1,000 mg by mouth every 6 hours as needed for mild pain   bisacodyl (DULCOLAX) 5 MG EC tablet 7/22/2024  No Yes   Sig: Two days prior to exam take two (2) tablets at 4pm. One day prior to exam take two (2) tablets at 4pm   melatonin 5 MG tablet 7/22/2024  Yes Yes   Sig: Take 5 mg by mouth nightly as needed for sleep   multivitamin w/minerals (THERA-VIT-M) tablet Unknown  Yes Yes   Sig: Take 1 tablet by mouth daily   omeprazole (PRILOSEC) 40 MG DR capsule Past Month  No Yes   Sig: TAKE 1 CAPSULE(40 MG) BY MOUTH DAILY   polyethylene glycol (GOLYTELY) 236 g suspension 7/23/2024  No Yes   Sig: Two days before procedure at 5PM fill first container with water. Mix and drink an 8 oz glass every 10-15 minutes until HALF of the container is gone. Place the remainder in the refrigerator. One day before procedure at 5PM drink second half of bowel prep. Drink an 8 oz glass every 10-15 minutes until it is gone. Day of procedure 6 hours before arrival time fill the 2nd container with water. Mix and drink an 8 oz glass every 10-15 minutes until HALF of the container is gone. Discard the remaining solution.   valACYclovir (VALTREX) 1000  mg tablet Unknown  No Yes   Sig: Take 2 tablets (2,000 mg) by mouth 2 times daily      Facility-Administered Medications: None      Patient Active Problem List   Diagnosis    Tear film insufficiency    Chronic allergic conjunctivitis - Both Eyes    Post-traumatic osteoarthritis of right knee    Ankle pain, left    Adjustment disorder with anxious mood    Melanoma of skin (H)    Neoplasm of uncertain behavior of skin of chest    Basal cell carcinoma (BCC) of left thigh    GERD (gastroesophageal reflux disease)    Class 3 severe obesity with serious comorbidity and body mass index (BMI) of 40.0 to 44.9 in adult, unspecified obesity type (H)      Past Medical History:   Diagnosis Date    Pain in joint, ankle and foot, left     traumatic injury in childhood    Post-traumatic osteoarthritis of right knee 07/11/2016      Past Surgical History:   Procedure Laterality Date    ARTHRODESIS ANKLE Left 12/27/2023    Procedure: FUSION, JOINT, ANKLE - LEFT;  Surgeon: Aman Rincon MD;  Location: [facility] OR    right knee      Meniscus injection    STRIP VEIN Bilateral       Social History     Tobacco Use    Smoking status: Never    Smokeless tobacco: Never   Substance Use Topics    Alcohol use: Yes     Comment: 2 PER WEEK      Family History   Problem Relation Age of Onset    Hypertension Mother     Eye Surgery Mother     GERD Mother     Lung Cancer Mother     Diabetes Father     Eye Surgery Father     Coronary Artery Disease Father     Heart Failure Father     No Known Problems Sister     No Known Problems Sister     Breast Cancer Maternal Grandmother         later in life    Alcoholism Maternal Grandfather     No Known Problems Paternal Grandmother     No Known Problems Paternal Grandfather     Breast Cancer Maternal Aunt         later in life    No Known Problems Daughter     No Known Problems Daughter     Glaucoma No family hx of     Macular Degeneration No family hx of       PHYSICAL EXAM:   BP (!) 141/86   Pulse 77    "Resp 16   LMP 08/01/2016 (Approximate)   SpO2 96%  Estimated body mass index is 39.75 kg/m  as calculated from the following:    Height as of 7/18/24: 1.803 m (5' 11\").    Weight as of 7/18/24: 129.3 kg (285 lb).   RESP: lungs clear to auscultation - no rales, rhonchi or wheezes   CV: regular rates and rhythm   ASA Class 3 - Severe systemic disease, but not incapacitating    Assessment: 60 y/o woman for screening colonoscopy    Plan: Colonoscopy with moderate sedation.  Risks of the procedure were discussed including, but not limited to, bleeding, perforation and missed lesions.  Patient understands and is willing to proceed.    Arthur Lujan MD ....................  7/23/2024   9:36 AM  Colon and Rectal Surgery Staff  [phone]     "

## 2024-07-24 LAB
PATH REPORT.COMMENTS IMP SPEC: NORMAL
PATH REPORT.COMMENTS IMP SPEC: NORMAL
PATH REPORT.FINAL DX SPEC: NORMAL
PATH REPORT.GROSS SPEC: NORMAL
PATH REPORT.MICROSCOPIC SPEC OTHER STN: NORMAL
PATH REPORT.RELEVANT HX SPEC: NORMAL
PHOTO IMAGE: NORMAL

## 2024-07-24 PROCEDURE — 88305 TISSUE EXAM BY PATHOLOGIST: CPT | Mod: 26 | Performed by: PATHOLOGY

## 2024-08-01 ENCOUNTER — TELEPHONE (OUTPATIENT)
Dept: ENDOCRINOLOGY | Facility: CLINIC | Age: 59
End: 2024-08-01
Payer: COMMERCIAL

## 2024-08-01 NOTE — TELEPHONE ENCOUNTER
Left Voicemail (1st Attempt) and Sent Mychart (1st Attempt) for the patient to call back and schedule the following:    Appointment type: [facility]I HOWE  Provider: Wendy Gonzalez  Return date: Reschedule 11/27/24 appt as provider no longer available  Specialty phone number: [phone]  Additional appointment(s) needed: n/a  Additonal Notes: n/a

## 2024-08-02 ENCOUNTER — VIRTUAL VISIT (OUTPATIENT)
Dept: ENDOCRINOLOGY | Facility: CLINIC | Age: 59
End: 2024-08-02

## 2024-08-02 ENCOUNTER — THERAPY VISIT (OUTPATIENT)
Dept: PHYSICAL THERAPY | Facility: CLINIC | Age: 59
End: 2024-08-02
Payer: COMMERCIAL

## 2024-08-02 DIAGNOSIS — Z98.890 STATUS POST FOOT SURGERY: Primary | ICD-10-CM

## 2024-08-02 DIAGNOSIS — E66.3 OVERWEIGHT: Primary | ICD-10-CM

## 2024-08-02 PROCEDURE — 99207 PR NO CHARGE LOS: CPT | Mod: 95

## 2024-08-02 PROCEDURE — 97112 NEUROMUSCULAR REEDUCATION: CPT | Mod: GP

## 2024-08-02 PROCEDURE — 97110 THERAPEUTIC EXERCISES: CPT | Mod: GP

## 2024-08-02 NOTE — LETTER
8/2/2024       RE: Jacqui Grijalva  [address]  [place] [address]     Dear Colleague,    Thank you for referring your patient, Jacqui Grijalva, to the [facility] WEIGHT MANAGEMENT CLINIC [place] [facility]. Please see a copy of my visit note below.    24 week   Video visit  24 week, visit 3  Wendy Agosto    CW: 283 lb (2 lbs less then last time)    Exercise goal: 210 minutes of exercise per week; 30 minutes per day and  Physical Therapy    Share my goal with my daughter    Mindset: It will not be as hard as I think it will be. I feel good after. I did it before and I can do it again.    I will start today. I am confident and ready.    I will find my airpods.    Loose-it ap; I fill it out daily.  I put my exercise into the loose-it ap  ( I will look for a new ap to better track my exercise; myfitness pal) I need ap that tracks a variety of exercise    [facility]  visit; August 23 at 9 am    TIME SPENT: 30 minutes    Meri Greenberg  [facility], National Board Certified Health &   Lead Health   [facility] Comprehensive Weight Management  [email or web address]   To schedule: [phone]   Gender pronouns: she/her           Again, thank you for allowing me to participate in the care of your patient.      Sincerely,    Meri Greenberg

## 2024-08-02 NOTE — PROGRESS NOTES
24 week   Video visit  24 week, visit 3  Wendy Agosto    CW: 283 lb (2 lbs less then last time)    Exercise goal: 210 minutes of exercise per week; 30 minutes per day and  Physical Therapy    Share my goal with my daughter    Mindset: It will not be as hard as I think it will be. I feel good after. I did it before and I can do it again.    I will start today. I am confident and ready.    I will find my airpods.    Loose-it ap; I fill it out daily.  I put my exercise into the loose-it ap  ( I will look for a new ap to better track my exercise; myfitness pal) I need ap that tracks a variety of exercise    [facility]  visit; August 23 at 9 am    TIME SPENT: 30 minutes    Meri STEWARD-[facility], National Board Certified Health &   Lead Health   [facility] Comprehensive Weight Management  [email or web address]  [email or web address]   To schedule: [phone]   Gender pronouns: she/her

## 2024-08-06 ENCOUNTER — PATIENT OUTREACH (OUTPATIENT)
Dept: GASTROENTEROLOGY | Facility: CLINIC | Age: 59
End: 2024-08-06
Payer: COMMERCIAL

## 2024-08-07 ENCOUNTER — VIRTUAL VISIT (OUTPATIENT)
Dept: SLEEP MEDICINE | Facility: CLINIC | Age: 59
End: 2024-08-07
Attending: NURSE PRACTITIONER
Payer: COMMERCIAL

## 2024-08-07 VITALS — HEIGHT: 71 IN | BODY MASS INDEX: 39.48 KG/M2 | WEIGHT: 282 LBS

## 2024-08-07 DIAGNOSIS — R06.83 SNORING: ICD-10-CM

## 2024-08-07 DIAGNOSIS — R06.81 WITNESSED EPISODE OF APNEA: Primary | ICD-10-CM

## 2024-08-07 PROCEDURE — 99205 OFFICE O/P NEW HI 60 MIN: CPT | Mod: 95 | Performed by: INTERNAL MEDICINE

## 2024-08-07 ASSESSMENT — SLEEP AND FATIGUE QUESTIONNAIRES
HOW LIKELY ARE YOU TO NOD OFF OR FALL ASLEEP WHILE LYING DOWN TO REST IN THE AFTERNOON WHEN CIRCUMSTANCES PERMIT: SLIGHT CHANCE OF DOZING
HOW LIKELY ARE YOU TO NOD OFF OR FALL ASLEEP IN A CAR, WHILE STOPPED FOR A FEW MINUTES IN TRAFFIC: WOULD NEVER DOZE
HOW LIKELY ARE YOU TO NOD OFF OR FALL ASLEEP WHILE SITTING INACTIVE IN A PUBLIC PLACE: WOULD NEVER DOZE
HOW LIKELY ARE YOU TO NOD OFF OR FALL ASLEEP WHILE SITTING QUIETLY AFTER LUNCH WITHOUT ALCOHOL: WOULD NEVER DOZE
HOW LIKELY ARE YOU TO NOD OFF OR FALL ASLEEP WHILE SITTING AND READING: WOULD NEVER DOZE
HOW LIKELY ARE YOU TO NOD OFF OR FALL ASLEEP WHILE WATCHING TV: WOULD NEVER DOZE
HOW LIKELY ARE YOU TO NOD OFF OR FALL ASLEEP WHILE SITTING AND TALKING TO SOMEONE: WOULD NEVER DOZE
HOW LIKELY ARE YOU TO NOD OFF OR FALL ASLEEP WHEN YOU ARE A PASSENGER IN A CAR FOR AN HOUR WITHOUT A BREAK: SLIGHT CHANCE OF DOZING

## 2024-08-07 ASSESSMENT — PAIN SCALES - GENERAL: PAINLEVEL: NO PAIN (1)

## 2024-08-07 NOTE — NURSING NOTE
Current patient location:  [address] [place] -work    Is the patient currently in the state of [place]? YES    Visit mode:VIDEO    If the visit is dropped, the patient can be reconnected by: VIDEO VISIT: Text to cell phone:   Telephone Information:   Mobile [phone]       Will anyone else be joining the visit? NO  (If patient encounters technical issues they should call [phone]    How would you like to obtain your AVS? MyChart    Are changes needed to the allergy or medication list? Pt stated no changes to allergies and Pt stated no med changes    Are refills needed on medications prescribed by this physician? NO    Rooming Documentation:  Assigned questionnaire(s) completed      Reason for visit: Consult    Melissa LOCKETTF

## 2024-08-07 NOTE — PATIENT INSTRUCTIONS
"          MY TREATMENT INFORMATION FOR SLEEP APNEA-  Jacqui Grijalva    DOCTOR : Syd Bashir MD    Am I having a sleep study at a sleep center?  --->Due to normal delays, you will be contacted within 2-4 weeks to schedule    Am I having a home sleep study?  --->Watch the video for the device you are using:    -/drop off device-   https://www.[employer] Technologies[employer].com/watch?v=yGGFBdELGhk    -Disposable device sent out require phone/computer application-   https://www.Solum.com/watch?v=BCce_vbiwxE      Frequently asked questions:  1. What is Obstructive Sleep Apnea (ANUP)? ANUP is the most common type of sleep apnea. Apnea means, \"without breath.\"  Apnea is most often caused by narrowing or collapse of the upper airway as muscles relax during sleep.   Almost everyone has occasional apneas. Most people with sleep apnea have had brief interruptions at night frequently for many years.  The severity of sleep apnea is related to how frequent and severe the events are.   2. What are the consequences of ANUP? Symptoms include: feeling sleepy during the day, snoring loudly, gasping or stopping of breathing, trouble sleeping, and occasionally morning headaches or heartburn at night.  Sleepiness can be serious and even increase the risk of falling asleep while driving. Other health consequences may include development of high blood pressure and other cardiovascular disease in persons who are susceptible. Untreated ANUP  can contribute to heart disease, stroke and diabetes.   3. What are the treatment options? In most situations, sleep apnea is a lifelong disease that must be managed with daily therapy. Medications are not effective for sleep apnea and surgery is generally not considered until other therapies have been tried. Your treatment is your choice . Continuous Positive Airway (CPAP) works right away and is the therapy that is effective in nearly everyone. An oral device to hold your jaw forward is usually the next most " reliable option. Other options include postioning devices (to keep you off your back), weight loss, and surgery including a tongue pacing device. There is more detail about some of these options below.  4. Are my sleep studies covered by insurance? Although we will request verification of coverage, we advise you also check in advance of the study to ensure there is coverage.    Important tips for those choosing CPAP and similar devices  REMEMBER-IF YOU RECEIVE A CALL FROM  [phone]-->IT IS TO SETUP A DEVICE  For new devices, sign up for device MARIELA to monitor your device for your followup visits  We encourage you to utilize the [employer] mariela or website ( [email or web address][email or web address] ) to monitor your therapy progress and share the data with your healthcare team when you discuss your sleep apnea.                                                    Know your equipment:  CPAP is continuous positive airway pressure that prevents obstructive sleep apnea by keeping the throat from collapsing while you are sleeping. In most cases, the device is  smart  and can slowly self-adjusts if your throat collapses and keeps a record every day of how well you are treated-this information is available to you and your care team.  BPAP is bilevel positive airway pressure that keeps your throat open and also assists each breath with a pressure boost to maintain adequate breathing.  Special kinds of BPAP are used in patients who have inadequate breathing from lung or heart disease. In most cases, the device is  smart  and can slowly self-adjusts to assist breathing. Like CPAP, the device keeps a record of how well you are treated.  Your mask is your connection to the device. You get to choose what feels most comfortable and the staff will help to make sure if fits. Here: are some examples of the different masks that are available: Magnetic mask aids may assist with use but there are safety issues that should be addressed when  considering with magnets* ( see end of discussion).       Key points to remember on your journey with sleep apnea:  Sleep study.  PAP devices often need to be adjusted during a sleep study to show that they are effective and adjusted right.  Good tips to remember: Try wearing just the mask during a quiet time during the day so your body adapts to wearing it. A humidifier is recommended for comfort in most cases to prevent drying of your nose and throat. Allergy medication from your provider may help you if you are having nasal congestion.  Getting settled-in. It takes more than one night for most of us to get used to wearing a mask. Try wearing just the mask during a quiet time during the day so your body adapts to wearing it. A humidifier is recommended for comfort in most cases. Our team will work with you carefully on the first day and will be in contact within 4 days and again at 2 and 4 weeks for advice and remote device adjustments. Your therapy is evaluated by the device each day.   Use it every night. The more you are able to sleep naturally for 7-8 hours, the more likely you will have good sleep and to prevent health risks or symptoms from sleep apnea. Even if you use it 4 hours it helps. Occasionally all of us are unable to use a medical therapy, in sleep apnea, it is not dangerous to miss one night.   Communicate. Call our skilled team on the number provided on the first day if your visit for problems that make it difficult to wear the device. Over 2 out of 3 patients can learn to wear the device long-term with help from our team. Remember to call our team or your sleep providers if you are unable to wear the device as we may have other solutions for those who cannot adapt to mask CPAP therapy. It is recommended that you sleep your sleep provider within the first 3 months and yearly after that if you are not having problems.   Use it for your health. We encourage use of CPAP masks during daytime quiet  periods to allow your face and brain to adapt to the sensation of CPAP so that it will be a more natural sensation to awaken to at night or during naps. This can be very useful during the first few weeks or months of adapting to CPAP though it does not help medically to wear CPAP during wakefulness and  should not be used as a strategy just to meet guidelines.  Take care of your equipment. Make sure you clean your mask and tubing using directions every day and that your filter and mask are replaced as recommended or if they are not working.     *Masks with magnets:  Updated Contraindications  Masks with magnetic components are contraindicated for use by patients where they, or anyone in close physical contact while using the mask, have the following:   Active medical implants that interact with magnets (i.e., pacemakers, implantable cardioverter defibrillators (ICD), neurostimulators, cerebrospinal fluid (CSF) shunts, insulin/infusion pumps)   Metallic implants/objects containing ferromagnetic material (i.e., aneurysm clips/flow disruption devices, embolic coils, stents, valves, electrodes, implants to restore hearing or balance with implanted magnets, ocular implants, metallic splinters in the eye)  Updated Warning  Keep the mask magnets at a safe distance of at least 6 inches (150 mm) away from implants or medical devices that may be adversely affected by magnetic interference. This warning applies to you or anyone in close physical contact with your mask. The magnets are in the frame and lower headgear clips, with a magnetic field strength of up to 400mT. When worn, they connect to secure the mask but may inadvertently detach while asleep.  Implants/medical devices, including those listed within contraindications, may be adversely affected if they change function under external magnetic fields or contain ferromagnetic materials that attract/repel to magnetic fields (some metallic implants, e.g., contact lenses  with metal, dental implants, metallic cranial plates, screws, wilner hole covers, and bone substitute devices). Consult your physician and  of your implant / other medical device for information on the potential adverse effects of magnetic fields.    BESIDES CPAP, WHAT OTHER THERAPIES ARE THERE?    Positioning Device  Positioning devices are generally used when sleep apnea is mild and only occurs on your back.This example shows a pillow that straps around the waist. It may be appropriate for those whose sleep study shows milder sleep apnea that occurs primarily when lying flat on one's back. Preliminary studies have shown benefit but effectiveness at home may need to be verified by a home sleep test. These devices are generally not covered by medical insurance.  Examples of devices that maintain sleeping on the back to prevent snoring and mild sleep apnea.    Belt type body positioner  http://Jack [employer]â€™s/    Electronic reminder  http://nightshifttherapy.[employer]/            Oral Appliance  What is oral appliance therapy?  An oral appliance device fits on your teeth at night like a retainer used after having braces. The device is made by a specialized dentist and requires several visits over 1-2 months before a manufactured device is made to fit your teeth and is adjusted to prevent your sleep apnea. Once an oral device is working properly, snoring should be improved. A home sleep test may be recommended at that time if to determine whether the sleep apnea is adequately treated.       Some things to remember:  -Oral devices are often, but not always, covered by your medical insurance. Be sure to check with your insurance provider.   -If you are referred for oral therapy, you will be given a list of specialized dentists to consider or you may choose to visit the Web site of the American Academy of Dental Sleep Medicine  -Oral devices are less likely to work if you have severe sleep apnea or are extremely  overweight.     More detailed information  An oral appliance is a small acrylic device that fits over the upper and lower teeth  (similar to a retainer or a mouth guard). This device slightly moves jaw forward, which moves the base of the tongue forward, opens the airway, improves breathing for effective treat snoring and obstructive sleep apnea in perhaps 7 out of 10 people .  The best working devices are custom-made by a dental device  after a mold is made of the teeth 1, 2, 3.  When is an oral appliance indicated?  Oral appliance therapy is recommended as a first-line treatment for patients with primary snoring, mild sleep apnea, and for patients with moderate sleep apnea who prefer appliance therapy to use of CPAP4, 5. Severity of sleep apnea is determined by sleep testing and is based on the number of respiratory events per hour of sleep.   How successful is oral appliance therapy?  The success rate of oral appliance therapy in patients with mild sleep apnea is 75-80% while in patients with moderate sleep apnea it is 50-70%. The chance of success in patients with severe sleep apnea is 40-50%. The research also shows that oral appliances have a beneficial effect on the cardiovascular health of ANUP patients at the same magnitude as CPAP therapy7.  Oral appliances should be a second-line treatment in cases of severe sleep apnea, but if not completely successful then a combination therapy utilizing CPAP plus oral appliance therapy may be effective. Oral appliances tend to be effective in a broad range of patients although studies show that the patients who have the highest success are females, younger patients, those with milder disease, and less severe obesity. 3, 6.   Finding a dentist that practices dental sleep medicine  Specific training is available through the American Academy of Dental Sleep Medicine for dentists interested in working in the field of sleep. To find a dentist who is educated in  the field of sleep and the use of oral appliances, near you, visit the Web site of the American Academy of Dental Sleep Medicine.    References  1. Dalton, et al. Objectively measured vs self-reported compliance during oral appliance therapy for sleep-disordered breathing. Chest 2013; 144(5): 9262-3749.  2. Calin et al. Objective measurement of compliance during oral appliance therapy for sleep-disordered breathing. Thorax 2013; 68(1): 91-96.  3. Arcadio et al. Mandibular advancement devices in 620 men and women with ANUP and snoring: tolerability and predictors of treatment success. Chest 2004; 125: 5181-9366.  4. Jairo, et al. Oral appliances for snoring and ANUP: a review. Sleep 2006; 29: 244-262.  5. Bautista et al. Oral appliance treatment for ANUP: an update. J Clin Sleep Med 2014; 10(2): 215-227.  6. Nelson et al. Predictors of OSAH treatment outcome. J Dent Res 2007; 86: 9156-7186.      Weight Loss:   Your Body mass index is 39.33 kg/m .    Being overweight does not necessarily mean you will have health consequences.  Those who have BMI over 35 or over 27 with existing medical conditions carries greater risk.   Weight loss decreases severity of sleep apnea in most people with obesity. For those with mild obesity who have developed snoring with weight gain, even 15-30 pound weight loss can improve and occasionally milder eliminate sleep apnea.  Structured and life-long dietary and health habits are necessary to lose weight and keep healthier weight levels.     The Comprehensive Weight loss program offers all aspects of weight loss strategies including two Non-Surgical Weight Loss Programs: Medical Weight Management and our 24 Week Healthy Lifestyle Program:    Medical Weight Management: You will meet with a Medical Weight Management Provider, as well as a Registered Dietician. The program may include medication therapy, dietary education, recommended exercise and physical therapy programs,  monthly support group meetings, and possible psychological counseling. Follow up visits with the provider or dietician are scheduled based on your progress and needs.    24 Week Healthy Lifestyle Program: This unique program is designed to give you the support of weekly appointments and activities thru a 24-week period. It may include all of the components of the basic program (above), with the addition of 11 individual Health  Visits, 24-week access to the [employer] website for over 700 online classes, and monthly support group meetings. This program has an out-of-pocket expense of $499 to cover the items that can not be billed to insurance (health coaches and [employer] access), and is non-refundable/non-transferable (you may be able to use a Health Savings Account; ask your HSA provider). There may be an optional meal replacement plan prescribed as well.   Surgical management achieves meaningful long-term weight loss and improvement in health risks in most patients with more severe obesity.      Sleep Apnea Surgery:    Surgery for obstructive sleep apnea is considered generally only when other therapies fail to work. Surgery may be discussed with you if you are having a difficult time tolerating CPAP and or when there is an abnormal structure that requires surgical correction.  Nose and throat surgeries often enlarge the airway to prevent collapse.  Most of these surgeries create pain for 1-2 weeks and up to half of the most common surgeries are not effective throughout life.  You should carefully discuss the benefits and drawbacks to surgery with your sleep provider and surgeon to determine if it is the best solution for you.   More information  Surgery for ANUP is directed at areas that are responsible for narrowing or complete obstruction of the airway during sleep.  There are a wide range of procedures available to enlarge and/or stabilize the airway to prevent blockage of breathing in the three major  areas where it can occur: the palate, tongue, and nasal regions.  Successful surgical treatment depends on the accurate identification of the factors responsible for obstructive sleep apnea in each person.  A personalized approach is required because there is no single treatment that works well for everyone.  Because of anatomic variation, consultation with an examination by a sleep surgeon is a critical first step in determining what surgical options are best for each patient.  In some cases, examination during sedation may be recommended in order to guide the selection of procedures.  Patients will be counseled about risks and benefits as well as the typical recovery course after surgery. Surgery is typically not a cure for a person s ANUP.  However, surgery will often significantly improve one s ANUP severity (termed  success rate ).  Even in the absence of a cure, surgery will decrease the cardiovascular risk associated with OSA7; improve overall quality of life8 (sleepiness, functionality, sleep quality, etc).      Palate Procedures:  Patients with ANUP often have narrowing of their airway in the region of their tonsils and uvula.  The goals of palate procedures are to widen the airway in this region as well as to help the tissues resist collapse.  Modern palate procedure techniques focus on tissue conservation and soft tissue rearrangement, rather than tissue removal.  Often the uvula is preserved in this procedure. Residual sleep apnea is common in patient after pharyngoplasty with an average reduction in sleep apnea events of 33%2.      Tongue Procedures:  ExamWhile patients are awake, the muscles that surround the throat are active and keep this region open for breathing. These muscles relax during sleep, allowing the tongue and other structures to collapse and block breathing.  There are several different tongue procedures available.  Selection of a tongue base procedure depends on characteristics seen on  physical exam.  Generally, procedures are aimed at removing bulky tissues in this area or preventing the back of the tongue from falling back during sleep.  Success rates for tongue surgery range from 50-62%3.    Hypoglossal Nerve Stimulation:  Hypoglossal nerve stimulation has recently received approval from the [place] Food and Drug Administration for the treatment of obstructive sleep apnea.  This is based on research showing that the system was safe and effective in treating sleep apnea6.  Results showed that the median AHI score decreased 68%, from 29.3 to 9.0. This therapy uses an implant system that senses breathing patterns and delivers mild stimulation to airway muscles, which keeps the airway open during sleep.  The system consists of three fully implanted components: a small generator (similar in size to a pacemaker), a breathing sensor, and a stimulation lead.  Using a small handheld remote, a patient turns the therapy on before bed and off upon awakening.    Candidates for this device must be greater than 18 years of age, have moderate to severe obstructive sleep apnea with less than 25% central events  (AHI between 15-65), BMI less than 35, have tried CPAP/oral appliance for at least 8 weeks without success, and have appropriate upper airway anatomy (determined by a sleep endoscopy performed by Dr. Mark Easton or Dr. Sandeep Styles).    Nasal Procedures:  Nasal obstruction can interfere with nasal breathing during the day and night.  Studies have shown that relief of nasal obstruction can improve the ability of some patients to tolerate positive airway pressure therapy for obstructive sleep apnea1.  Treatment options include medications such as nasal saline, topical corticosteroid and antihistamine sprays, and oral medications such as antihistamines or decongestants. Non-surgical treatments can include external nasal dilators for selected patients. If these are not successful by themselves,  surgery can improve the nasal airway either alone or in combination with these other options.        Combination Procedures:  Combination of surgical procedures and other treatments may be recommended, particularly if patients have more than one area of narrowing or persistent positional disease.  The success rate of combination surgery ranges from 66-80%2,3.    References  Jammie PEREZ. The Role of the Nose in Snoring and Obstructive Sleep Apnoea: An Update.  Eur Arch Otorhinolaryngol. 2011; 268: 1365-73.   Quiana SM; Alli JA; Bernabe JR; Pallanch JF; Olayinka MB; Sacha SG; Ken JI. Surgical modifications of the upper airway for obstructive sleep apnea in adults: a systematic review and meta-analysis. SLEEP 2010;33(10):7764-4360. Sabrina CM. Hypopharyngeal surgery in obstructive sleep apnea: an evidence-based medicine review.  Arch Otolaryngol Head Neck Surg. 2006 Feb;132(2):206-13.  Jose YH1, Matthew Y, Jose CINDY. The efficacy of anatomically based multilevel surgery for obstructive sleep apnea. Otolaryngol Head Neck Surg. 2003 Oct;129(4):327-35.  Sabrina CM, Goldberg A. Hypopharyngeal Surgery in Obstructive Sleep Apnea: An Evidence-Based Medicine Review. Arch Otolaryngol Head Neck Surg. 2006 Feb;132(2):206-13.  Jonas MYERS et al. Upper-Airway Stimulation for Obstructive Sleep Apnea.  N Engl J Med. 2014 Jan 9;370(2):139-49.  Mayda Y et al. Increased Incidence of Cardiovascular Disease in Middle-aged Men with Obstructive Sleep Apnea. Am J Respir Crit Care Med; 2002 166: 159-165  Giles EM et al. Studying Life Effects and Effectiveness of Palatopharyngoplasty (SLEEP) study: Subjective Outcomes of Isolated Uvulopalatopharyngoplasty. Otolaryngol Head Neck Surg. 2011; 144: 623-631.        WHAT IF I ONLY HAVE SNORING?    Mandibular advancement devices, lateral sleep positioning, long-term weight loss and treatment of nasal allergies have been shown to improve snoring.  Exercising tongue muscles with a game  (https://Wallop.[employer]ECO/us/mariela/soundly-reduce-snoring/ld4664534729) or stimulating the tongue during the day with a device (https://doi.org/10.3390/jms08031201) have improved snoring in some individuals.  https://www.[employer]!.[employer]/  https://www.sleepfoundation.org/best-anti-snoring-mouthpieces-and-mouthguards    Remember to Drive Safe... Drive Alive     Sleep health profoundly affects your health, mood, and your safety.  Thirty three percent of the population (one in three of us) is not getting enough sleep and many have a sleep disorder. Not getting enough sleep or having an untreated / undertreated sleep condition may make us sleepy without even knowing it. In fact, our driving could be dramatically impaired due to our sleep health. As your provider, here are some things I would like you to know about driving:     Here are some warning signs for impairment and dangerous drowsy driving:              -Having been awake more than 16 hours               -Looking tired               -Eyelid drooping              -Head nodding (it could be too late at this point)              -Driving for more than 30 minutes     Some things you could do to make the driving safer if you are experiencing some drowsiness:              -Stop driving and rest              -Call for transportation              -Make sure your sleep disorder is adequately treated     Some things that have been shown NOT to work when experiencing drowsiness while driving:              -Turning on the radio              -Opening windows              -Eating any  distracting  /  entertaining  foods (e.g., sunflower seeds, candy, or any other)              -Talking on the phone      Your decision may not only impact your life, but also the life of others. Please, remember to drive safe for yourself and all of us.

## 2024-08-07 NOTE — PROGRESS NOTES
[address] [address]  Phone: [phone]    Patient:  Jacqui Grijalva, Date of birth 1965  Date of Visit:  08/07/2024  Referring Provider Bijal Browne                Virtual Visit Details    Type of service:  Video Visit     Originating Location (pt. Location): Other Office    Distant Location (provider location):  On-site  Platform used for Video Visit: [facility]   Outpatient Sleep Medicine Visit - Video Clinical Encounter  August 7, 2024    Name: Jacqui Grijalva MRN# [id number]   Age: 59 year old YOB: 1965     Date of Consultation: August 7, 2024  Consultation is requested by: KAYLIE Latham [address] [address]  Primary care provider: Bijal Browne           Assessment and Plan:   Jacqui Grijalva is a 59 year old female with history of class III obesity on a weight loss program, recently lost over 15 pounds over the past few months is here to discuss her concerns regarding poor sleep, reported snoring and witnessed apneic episodes.  Sleep disordered breathing.  History and clinical presentation suggestive with a high pretest probability for obstructive sleep apnea.  In the absence of significant cardiopulmonary comorbidities a type III home sleep test is a reasonable diagnostic study to determine the presence and severity of obstructive sleep apnea.  Nocturia.  She is waking up between 3-5 times during the night due to urinary urgency.  This could be related to undiagnosed and untreated sleep disordered breathing.  Gastroesophageal reflux disease.  Previously being treated with omeprazole.  She has not taken the medication over the past 2 months and has no residual symptoms of reflux at this time.      Comorbid Diagnoses:    Class III obesity.  Gastroesophageal reflux disease.    Summary Recommendations:    NOx T3 Home sleep study to  screen for and determine the severity of obstructive sleep apnea.  Jacqui is advised to avoid eating 2 hours prior to going to bed.  Positional therapy with elevating the head and of the bed was also discussed and can be applied as needed after the diagnostic sleep study.  She is advised to avoid driving or operating heavy machinery when and if feeling drowsy.    Summary Counseling:    Check out [email or web address]           History of Present Illness:     Jacqui Grijalva is a 59 year old female with struggled with chronic pain due to traumatic arthritis involving the right knee and ankle which has significantly improved since her surgery earlier in the year.  She is currently enrolled in a weight loss program and has successfully lost over 15 pounds in the past 2 months.  For several years she has been told of her loud snoring.  Last year she canceled her trip to [place] because she was concerned of her loud snoring being socially disruptive to her traveling group.  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  During the work week she usually will go to bed around 9 PM and will lay there either reading or interacting on her smart phone that she fell asleep around 10 PM.  During the night she can wake between 2-5 times usually of unclear reasons but will always have the need to go use the bathroom.  She is able to fall back to sleep within minutes.  She wakes up around 6 AM with an and on most days does not feel well rested.  Over the weekend she will sleep around 11 PM and will wake up without alarm around 7 AM.  Although she does not report of significant subjective daytime sleepiness she is not content with her sleep and does feel tired.  She is also very concerned about her labs snoring which has been reported to her by her daughter, sister.  She denies symptoms of orthopnea, paroxysmal nocturnal dyspnea or sleep paralysis.  She denies having symptoms of hypnagogic  hypnopompic hallucinations.        CURRENT THERAPY-Subjective:  Sleep wake schedule:   Workday bedtime 10 PM  Awakening 6 AM with using alarm   Nonworkday bedtime 11 PM Awakening 7 AM without alarm   Awakenings 3-5 for 1-5 minutes either due pain in past or to use the bathroom/week  Naps: Denies     She does not feel rested in the morning.  STOP-BAN  [place] Sleepiness Scale:       KOMAL Total Score: 18         Medications:     Current Outpatient Medications   Medication Sig Dispense Refill    acetaminophen (TYLENOL) 500 MG tablet Take 500-1,000 mg by mouth every 6 hours as needed for mild pain      IBUPROFEN PO       melatonin 5 MG tablet Take 5 mg by mouth nightly as needed for sleep      multivitamin w/minerals (THERA-VIT-M) tablet Take 1 tablet by mouth daily      omeprazole (PRILOSEC) 40 MG DR capsule TAKE 1 CAPSULE(40 MG) BY MOUTH DAILY 90 capsule 0    valACYclovir (VALTREX) 1000 mg tablet Take 2 tablets (2,000 mg) by mouth 2 times daily 4 tablet 4    bisacodyl (DULCOLAX) 5 MG EC tablet Two days prior to exam take two (2) tablets at 4pm. One day prior to exam take two (2) tablets at 4pm 4 tablet 0    polyethylene glycol (GOLYTELY) 236 g suspension Two days before procedure at 5PM fill first container with water. Mix and drink an 8 oz glass every 10-15 minutes until HALF of the container is gone. Place the remainder in the refrigerator. One day before procedure at 5PM drink second half of bowel prep. Drink an 8 oz glass every 10-15 minutes until it is gone. Day of procedure 6 hours before arrival time fill the 2nd container with water. Mix and drink an 8 oz glass every 10-15 minutes until HALF of the container is gone. Discard the remaining solution. 8000 mL 0     No current facility-administered medications for this visit.        Allergies   Allergen Reactions    Pcn [Penicillins]             Past Medical History:     Does not need 02 supplement at night   Past Medical History:   Diagnosis Date    Pain  in joint, ankle and foot, left     traumatic injury in childhood    Post-traumatic osteoarthritis of right knee 07/11/2016             Past Surgical History:    No h/o  upper airway surgery  Past Surgical History:   Procedure Laterality Date    ARTHRODESIS ANKLE Left 12/27/2023    Procedure: FUSION, JOINT, ANKLE - LEFT;  Surgeon: Aman Rincon MD;  Location: [facility] OR    COLONOSCOPY N/A 7/23/2024    Procedure: Colonoscopy;  Surgeon: Arthur Lujan MD;  Location:  GI    right knee      Meniscus injection    STRIP VEIN Bilateral             Physical Examination:     Vitals:  No vitals were obtained today due to virtual visit.    Physical Exam   EYES: Eyes grossly normal to inspection.  No discharge or erythema, or obvious scleral/conjunctival abnormalities.  SKIN: Visible skin clear. No significant rash, abnormal pigmentation or lesions.  NEURO: Cranial nerves grossly intact.  Mentation and speech appropriate for age.  GENERAL: Healthy, alert and no distress  RESP: No audible wheeze, cough, or visible cyanosis.  No visible retractions or increased work of breathing.    PSYCH: Mentation appears normal, affect normal/bright, judgement and insight intact, normal speech and appearance well-groomed.    Copy to: Bijal Browne    Total of 60 minutes of time was spent with patient, this included the interview and exam, and review of the chart/labs/imaging/sleep study/PAP therapy data on 08/07/2024. Greater than 50% of which was spent counseling and coordinating care.     Syd Bashir MD 8/7/2024     [facility]   Floor 1, [address]   [address] [place] [address]   Appointments: [phone]    [facility]  3rd Floor  [address], [place] [address]

## 2024-08-22 ENCOUNTER — THERAPY VISIT (OUTPATIENT)
Dept: PHYSICAL THERAPY | Facility: CLINIC | Age: 59
End: 2024-08-22
Payer: COMMERCIAL

## 2024-08-22 DIAGNOSIS — Z98.890 STATUS POST FOOT SURGERY: Primary | ICD-10-CM

## 2024-08-22 PROCEDURE — 97112 NEUROMUSCULAR REEDUCATION: CPT | Mod: GP

## 2024-08-22 PROCEDURE — 97110 THERAPEUTIC EXERCISES: CPT | Mod: GP

## 2024-08-22 NOTE — PROGRESS NOTES
08/22/24 0500   Appointment Info   Signing clinician's name / credentials Joaquin Wong DPT   Total/Authorized Visits E&T   Visits Used [address] [place] [address]   Medical Diagnosis Status post foot surgery (Z98.890)   PT Tx Diagnosis s/p left ankle arthrodesis   Precautions/Limitations no restrictions per MD   Progress Note/Certification   Onset of illness/injury or Date of Surgery 12/27/23   Therapy Frequency 1x/week   Predicted Duration 3 months   GOALS   PT Goals 2   PT Goal 1   Goal Identifier Activity tolerance   Goal Description Pt will be able to golf and walk for at least 30 minutes to improve QOL.   Goal Progress met   Target Date 06/09/24   Date Met 06/13/24   PT Goal 2   Goal Identifier Uneven terrain   Goal Description Pt will be able to walk 9-hole golf course and hike in uneven terrain without pain to improve QOL.   Goal Progress ongoing   Target Date 08/11/24   Date Met 08/22/24   Subjective Report   Subjective Report Pt feels like she is doing very well.  She feels ready to discharge from PT and transition to Get Moving program.   Objective Measures   Objective Measures Objective Measure 1;Objective Measure 2;Objective Measure 3   Objective Measure 1   Objective Measure Biking   Details x5 min, level 3, 1.16 miles, 23 cals   Objective Measure 2   Objective Measure Left ankle DF   Objective Measure 3   Objective Measure Foot pain   Treatment Interventions (PT)   Interventions Therapeutic Procedure/Exercise;Neuromuscular Re-education;Therapeutic Activity   Therapeutic Procedure/Exercise   Therapeutic Procedures: strength, endurance, ROM, flexibility minutes (04608) 24   Therapeutic Procedures Ther Proc 2   Ther Proc 1 Bike   Ther Proc 1 - Details x5 min   Ther Proc 2 LEFS   Ther Proc 2 - Details completed outcome measure and discussed results   PTRx Ther Proc 1 Education Sheet General   PTRx Ther Proc 1 - Details rev   PTRx Ther Proc 2 Standing Gastroc Stretch   PTRx Ther Proc 2 - Details rev   PTRx Ther Proc 3  Standing Hip Flexion   PTRx Ther Proc 3 - Details rev   PTRx Ther Proc 4 Squat   PTRx Ther Proc 4 - Details 1x20   PTRx Ther Proc 5 Side Stepping With Theraband   PTRx Ther Proc 5 - Details rev   PTRx Ther Proc 6 Dumbbell Georgian Dead Lift - Bilateral   PTRx Ther Proc 6 - Details hip hinge training then 1x15 25 lb KB   Patient Response/Progress tolerated well   Neuromuscular Re-education   Neuromuscular re-ed of mvmt, balance, coord, kinesthetic sense, posture, proprioception minutes (62787) 8   PTRx Neuro Re-ed 1 Foot Doming   PTRx Neuro Re-ed 1 - Details rev   PTRx Neuro Re-ed 2 Single Leg Stance - Balance Left Foot   PTRx Neuro Re-ed 2 - Details 2x30 sec B   PTRx Neuro Re-ed 3 Tandem Stance   PTRx Neuro Re-ed 3 - Details 2x30 sec B firm ground then foam pad   PTRx Neuro Re-ed 4 Tandem Walking and Balance   PTRx Neuro Re-ed 4 - Details 1x20 feet   PTRx Neuro Re-ed 5 Marching Bridge   PTRx Neuro Re-ed 5 - Details 1x10 marching bridge cues for TA and glute stability   Patient Response/Progress tolerated well   Education   Learner/Method Patient   Education Comments HEP, POC   Plan   Home program PTRX, biking   Total Session Time   Timed Code Treatment Minutes 32   Total Treatment Time (sum of timed and untimed services) 32       DISCHARGE  Reason for Discharge: Patient has met all goals.  Patient chooses to discontinue therapy.    Equipment Issued: none    Discharge Plan: Patient to continue home program and transition to Get Moving program.    Referring Provider:  Aman Rincon

## 2024-08-23 ENCOUNTER — VIRTUAL VISIT (OUTPATIENT)
Dept: ENDOCRINOLOGY | Facility: CLINIC | Age: 59
End: 2024-08-23

## 2024-08-23 DIAGNOSIS — E66.3 OVERWEIGHT: Primary | ICD-10-CM

## 2024-08-23 PROCEDURE — 99207 PR NO CHARGE LOS: CPT | Mod: 95

## 2024-08-23 NOTE — LETTER
"8/23/2024       RE: Jacqui Grijalva  [address]  [place] [address]     Dear Colleague,    Thank you for referring your patient, Jacqui Grijalva, to the [facility]. Please see a copy of my visit note below.    24 week visit 4  VIDEO  VICKY MEADOWS  provider    Last visit note:  CW: 283 lb (2 lbs less then last time)     Exercise goal: 210 minutes of exercise per week; 30 minutes per day and  Physical Therapy     Share my goal with my daughter     Mindset: It will not be as hard as I think it will be. I feel good after. I did it before and I can do it again.     I will start today. I am confident and ready.     I will find my airpods.     Loose-it ap; I fill it out daily.  I put my exercise into the loose-it ap  ( I will look for a new ap to better track my exercise; myfitness pal) I need ap that tracks a variety of exercise    Visit note today:  CW: 279 lb; 15 lbs lost so far  Not taking any med; may take wegovy  I am already changing my life now.     GOALS: August 23  210 minutes of exercise per week  Reminder: Surround myself with positive healthy minded people  Ask about measurements at my next visit; and total body weight  Kind \"parental\" self talk; no shame, loving and encouraging, firm, setting healthy  Boundaries for my self  Look into a walking tate for after work 1-2 times per week; look into andry class;   Needs to be close to my house and convenient    NEXT HEALTH  VISIT: 9/13 at 10:30 am video visit    Meri Greenberg  [facility], National Board Certified Health &   Lead Health   [facility]  [email or web address]  [email or web address]   To schedule: [phone]   Gender pronouns: she/her                                    Again, thank you for allowing me to participate in the care of your patient.      Sincerely,    Meri" Yari

## 2024-08-23 NOTE — PROGRESS NOTES
"24 week visit 4  VIDEO  VICKY MEADOWS  provider    Last visit note:  CW: 283 lb (2 lbs less then last time)     Exercise goal: 210 minutes of exercise per week; 30 minutes per day and  Physical Therapy     Share my goal with my daughter     Mindset: It will not be as hard as I think it will be. I feel good after. I did it before and I can do it again.     I will start today. I am confident and ready.     I will find my airpods.     Loose-it ap; I fill it out daily.  I put my exercise into the loose-it ap  ( I will look for a new ap to better track my exercise; myfitness pal) I need ap that tracks a variety of exercise    Visit note today:  CW: 279 lb; 15 lbs lost so far  Not taking any med; may take wegovy  I am already changing my life now.     GOALS: August 23  210 minutes of exercise per week  Reminder: Surround myself with positive healthy minded people  Ask about measurements at my next visit; and total body weight  Kind \"parental\" self talk; no shame, loving and encouraging, firm, setting healthy  Boundaries for my self  Look into a walking tate for after work 1-2 times per week; look into andry class;   Needs to be close to my house and convenient    NEXT HEALTH  VISIT: 9/13 at 10:30 am video visit    [facility], National Board Certified Health &   Lead Health   [facility]  [email or web address]  [email or web address]   To schedule: [phone]   Gender pronouns: she/her                                "

## 2024-09-03 ENCOUNTER — VIRTUAL VISIT (OUTPATIENT)
Dept: ENDOCRINOLOGY | Facility: CLINIC | Age: 59
End: 2024-09-03
Payer: COMMERCIAL

## 2024-09-03 VITALS — HEIGHT: 71 IN | WEIGHT: 277 LBS | BODY MASS INDEX: 38.78 KG/M2

## 2024-09-03 DIAGNOSIS — Z86.39 HISTORY OF MORBID OBESITY: Primary | ICD-10-CM

## 2024-09-03 PROCEDURE — G2211 COMPLEX E/M VISIT ADD ON: HCPCS | Mod: 95

## 2024-09-03 PROCEDURE — 99203 OFFICE O/P NEW LOW 30 MIN: CPT | Mod: 95

## 2024-09-03 ASSESSMENT — PAIN SCALES - GENERAL: PAINLEVEL: NO PAIN (0)

## 2024-09-03 NOTE — PROGRESS NOTES
Virtual Visit Details    Type of service:  Video Visit     Originating Location (pt. Location): Home    Distant Location (provider location):  Off-site  Platform used for Video Visit: Tori

## 2024-09-03 NOTE — NURSING NOTE
Current patient location:  [facility].    Is the patient currently in the state of [place]? YES    Visit mode:VIDEO    If the visit is dropped, the patient can be reconnected by: VIDEO VISIT: Text to cell phone:   Telephone Information:   Mobile [phone]       Will anyone else be joining the visit? NO  (If patient encounters technical issues they should call [phone])    How would you like to obtain your AVS? MyChart    Are changes needed to the allergy or medication list? No    Are refills needed on medications prescribed by this physician? NO    Rooming Documentation:  Questionnaire(s) completed      Reason for visit: RECHECK    Khari KRAMER

## 2024-09-03 NOTE — PATIENT INSTRUCTIONS
"Thank you for allowing us the privilege of caring for you. We hope we provided you with the excellent service you deserve.   Please let us know if there is anything else we can do for you so that we can be sure you are completely satisfied with your care experience.    To ensure the quality of our services you may be receiving a patient satisfaction survey from an independent patient satisfaction monitoring company.    The greatest compliment you can give is a \"Likely to Recommend\"    Your visit was with Carole Guo PA-C today.    Instructions per today's visit:     Rinku Grijalva, it was great to visit with you today.  Here is a review of our visit.  If our clinic scheduler is not able to reach you please call [phone] to schedule your next appointments.    Plan  MTM appointment in 2 weeks to discuss potential wegovy start  Fibroscan ordered to assess for fatty liver.   Alternatives: topiramate, compounded semaglutide   Goals we discussed today:   Increasing daily fiber intake   Labs ordered today: Pth, vitamin d, cmp ordered today   Follow up with Wendy Gonzalez 11/27/24   Dietician appointment 9/10/24   Keep up the excellent work!       Information about Video Visits with [facility]: video visit information  _________________________________________________________________________________________________________________________________________________________  If you are asked by your clinic team to have your blood pressure checked:  [facility] do offer several locations for blood pressure checks. Please follow the below link to schedule an appointment. Scheduling an appointment at the pharmacy for a blood pressure check is now preferred.    Appointment Plus (appointment-plus.Locally)  _________________________________________________________________________________________________________________________________________________________  Important contact and scheduling " information:  Please call our contact center at [phone] to schedule your next appointments.  To find a lab location near you, please call [phone].  For any nursing questions or concerns call Dione De La Torre LPN at [phone] or Estephania Gill RN at [phone]  Please call during clinic hours Monday through Friday 8:00a - 4:00p if you have questions or you can contact us via [employer] at anytime and we will reply during clinic hours.    Lab results will be communicated through My Chart or letter (if My Chart not used). Please call the clinic if you have not received communication after 1 week or if you have any questions.?  Clinic Fax: [phone]    _________________________________________________________________________________________________________________________________________________________  Meal Replacement Products:    Here is the link to our new e-store where you can purchase our meal replacement products    [facility] E-Store  [email or web address]/store    The one week starter kit is a great way to sample a variety of products and see what works for you.    If you want more information about the product go to: [email or web address]    If you are an employee or [facility] or [facility] please contact your care team for a 10% estore discount    Free Shipping for orders over $75     Benefits of meal replacements products:    Portion and calorie control  Improved nutrition  Structured eating  Simplified food choices  Avoid contact with trigger foods  _________________________________________________________________________________________________________________________________________________________  Interested in working with a health ?  Health coaches work with you to improve your overall health and wellbeing.  They look at the whole person, and may involve discussion of different areas of life, including, but not limited to the four  pillars of health (sleep, exercise, nutrition, and stress management). Discuss with your care team if you would like to start working a health .  Health Coaching-3 Pack: Schedule by calling [phone]    $99 for three health coaching visits    Visits may be done in person or via phone    Coaching is a partnership between the  and the client; Coaches do not prescribe or diagnose    Coaching helps inspire the client to reach his/her personal goals   _________________________________________________________________________________________________________________________________________________________  24 Week Healthy Lifestyle Plan:    Our mission in the 24-week Healthy Lifestyle Plan is to provide you with individualized care by giving you the tools, education and support you need to lose weight and maintain a healthy lifestyle. In your 24-week journey, you ll be supported by a dedicated weight loss team that includes registered dietitians, medical weight management providers, health coaches, and nurses -- all with special expertise in weight loss -- to help you every step of the way.     Monthly meetings with your registered dietician or medical weight management provider help to review your progress, update your care plan, and make any adjustments needed to ensure success. Between these visits, weekly and bi-weekly health  visits will help you focus on the four pillars of weight loss -- stress, sleep, nutrition, and exercise -- and how you can best adapt each to achieve sustainable weight loss results.    In addition, you will be given exclusive access to online wellbeing classes through [facility].  Your initial visit will be with a medical weight management provider who will help to understand your weight loss goals and ensure this program is the right fit for you. Please let our team know if you are interested in the 24 week plan by sending a message to your care team or calling [phone] to  schedule.  _________________________________________________________________________________________________________________________________________________________  __________  [facility] Get Moving Program  Our team of physical therapists is trained to help you understand and take control of your condition. They will perform a thorough evaluation to determine your ability for activity and develop a customized plan to fit your goals and physical ability.  Scheduling: Unsure if the Get Moving program is right for you? Discuss the program with your medical provider or diabetes educator. You can also call us at [phone] to ask questions or schedule an appointment.   [facility] Get Moving Program  ____________________________________________________________________________________________________________________________________________________________________________   [facility] Diabetes Prevention Program (DPP)  If you have prediabetes and Medicare please contact us via [email or web address] to learn more about the Diabetes Prevention Program (DPP)  Program Details:   [facility] offers the year-long Diabetes Prevention Program (DPP). The program helps you to make lifestyle changes that prevent or delay type 2 diabetes by supporting healthy eating, increased physical activity, stress reduction and use of coping skills.   On average, previous [facility] DPP cohorts have lost and maintained at least 5% of their starting weight throughout the program and averaged more than 150 minutes of physical activity per week.  Participants meet weekly for one-hour group sessions over sixteen weeks, every other week for the next 8 weeks, and monthly for the last six months.   A year-long maintenance program is also available for participants who complete the first year.   Location & Cost:   During the COVID-19 Public Health Emergency, the program is offered virtually. When in-person classes can resume, they  will be held at [facility].  For people with Medicare, the program is covered in full. A self-pay option will also be available for those with non-Medicare insurance plans.   ______________________________________________________________________________________________________________________________________________________________________________________________________________________________    To work with a Behavioral Health Psychologist:    Call to schedule:    Louis Forrest - [phone]  Babar Whaley - [phone]  Sandra Lopez - [phone]  Maggy Romero - [phone]   Aneta Joel PhD (cannot accept Medicare) [phone]        Thank you,   [facility] Comprehensive Weight Management Team

## 2024-09-03 NOTE — PROGRESS NOTES
Return Medical Weight Management Note     Jacqui Grijalva  MRN:  [id number]  :  1965  MERCEDES:  9/3/2024    Dear KAYLIE Latham CNP,    I had the pleasure of seeing your patient Jacqui Grijalva. She is a 59 year old female who I am continuing to see for treatment of obesity related to:        2024     3:41 PM   --   I have the following health issues associated with obesity Sleep Apnea    GERD (Reflux)    Stress Incontinence    Osteoarthritis (joint disease)   I have the following symptoms associated with obesity Knee Pain    Fatigue       Assessment & Plan   Problem List Items Addressed This Visit    None  Visit Diagnoses       History of morbid obesity    -  Primary    Relevant Orders    Comprehensive metabolic panel    Hemoglobin A1c    Lipid panel reflex to direct LDL Fasting    Parathyroid Hormone Intact    Vitamin D Deficiency    Fibrosis Scan           Plan  MTM appointment in 2 weeks to discuss potential wegovy start  Fibroscan ordered to assess for fatty liver.   Alternatives: topiramate, compounded semaglutide   Goals we discussed today:   Increasing daily fiber intake   Labs ordered today: Pth, vitamin d, cmp ordered today   Follow up with Wendy Gonzalez 24   Dietician appointment 9/10/24   Keep up the excellent work!     Anti-obesity medication to be considered in the future for this patient if issues with getting wegovy   TOPIRAMATE  No history of kidney stones  No history of glaucoma   No issues with memory  No chronic kidney disease   .       INTERVAL HISTORY:  New MWM with Wendy Gonzalez  24   Overweight onset after her second pregnancy at 37 years old.  Previously weight stable around 200 pounds.  Weight gain has been gradual.  Weight influenced by mental health, especially emotional eating.  Has tried to lose weight in the past 6 months to food logging with no weight loss.  Family history of weight concerns.  Wants to lose weight to be more active and feel better  "overall.     Discussed AOM's.:  Phentermine can be considered in the future.  Anxiety is well-controlled.  No history of hypertension.  Topiramate can be considered in the future.  No history of kidney stones or disease.  Postmenopausal.  Naltrexone can be considered in the future.  No history of liver disease.  Not taking opioids.  GLP-1 can be considered in the future.  No history of pancreatitis.  No personal or family history of M EN 2 or MTC.  Is a [employer] employee, BMI> 40.    -no meds started   -started working with health . Referrals placed to health psych and get moving program.     Today in visit (9/3/24)  17lbs weight loss since last visit   \"My life has kind of transformed.\"   Working regularly with Meri Greenberg, tracking all food regularly.   Increasing daily activity.   -stopped working with health psychologist as she felt it was a bit of overlap with Meri Greenberg   -worsened hunger with successful weight loss, interested in starting  a weight loss med       MTM pharmacist with Lauren Bloch in 2 weeks to discuss medication options.  Is very interested in starting wegovy.   Experiencing a lot of hunger with her successful weight loss, hoping to start wegovy though we discussed there is some concern with coverage through [employer] insurance as her bmi is now below 40 (bmi 3 months ago 41, has since lost to bmi 38.63 with lifestyle changes). Provider to reach out to MTM pharmacist ahead of time to see if an appeal for wegovy would be possible given that this patient is an excellent candidate and has a recent qualifying bmi.     Discussed starting topiramate or compounded semaglutide in the mean time before the MTM appointment in 2 weeks, Jacqui stated she prefers to wait until the MTM appointment. Could also consider phentermine in the future though Jacqui had a borderline elevated blood pressure reading at recent visit so it would be ideal to confirm well controlled blood pressures prior to " starting.     Wt Readings from Last 5 Encounters:   09/03/24 125.6 kg (277 lb)   08/07/24 127.9 kg (282 lb)   07/18/24 129.3 kg (285 lb)   05/31/24 133.8 kg (295 lb)   05/30/24 133.4 kg (294 lb)       Anti-obesity medication history    Current:   None     Past/Failed/contraindicated:     Recent diet changes: tracking calories regularly, cutting down on snacking. Reducing cookies, chips and crackers. Ongoing issues with hunger.   Hitting 84g average protein daily.   Estimates 17g fiber.     Recent exercise/activity changes: ankle fusion surgery end of December, working regularly with physical therapist, getting to a place where she is finally able to increase her exercise. Was able [place] go [place] state fair this [place] which was a big accomplishment for her. Busy doing walking, biking, andry, golfing.       Vitamins/Labs: Taking a multivtamin Pth, vitamin d, cmp, lipids, A1c ordered.       CURRENT WEIGHT:   277 lbs 0 oz    Initial Weight (lbs): 294 lbs  Last Visits Weight: 127.9 kg (282 lb)  Cumulative weight loss (lbs): 17  Weight Loss Percentage: 5.78%        9/3/2024     3:27 PM   Changes and Difficulties   I have made the following changes to my diet since my last visit: Meal tracking   With regards to my diet, I am still struggling with: Hunger   I have made the following changes to my activity/exercise since my last visit: Increased exercise to over 210 minutes per week   With regards to my activity/exercise, I am still struggling with: Strength, balance, cardio fitness, foot and knee pain             MEDICATIONS:   Current Outpatient Medications   Medication Sig Dispense Refill    acetaminophen (TYLENOL) 500 MG tablet Take 500-1,000 mg by mouth every 6 hours as needed for mild pain      bisacodyl (DULCOLAX) 5 MG EC tablet Two days prior to exam take two (2) tablets at 4pm. One day prior to exam take two (2) tablets at 4pm 4 tablet 0    IBUPROFEN PO       melatonin 5 MG tablet Take 5 mg by mouth nightly as needed  "for sleep      multivitamin w/minerals (THERA-VIT-M) tablet Take 1 tablet by mouth daily      omeprazole (PRILOSEC) 40 MG DR capsule TAKE 1 CAPSULE(40 MG) BY MOUTH DAILY 90 capsule 0    polyethylene glycol (GOLYTELY) 236 g suspension Two days before procedure at 5PM fill first container with water. Mix and drink an 8 oz glass every 10-15 minutes until HALF of the container is gone. Place the remainder in the refrigerator. One day before procedure at 5PM drink second half of bowel prep. Drink an 8 oz glass every 10-15 minutes until it is gone. Day of procedure 6 hours before arrival time fill the 2nd container with water. Mix and drink an 8 oz glass every 10-15 minutes until HALF of the container is gone. Discard the remaining solution. 8000 mL 0    valACYclovir (VALTREX) 1000 mg tablet Take 2 tablets (2,000 mg) by mouth 2 times daily 4 tablet 4           9/3/2024     3:27 PM   Weight Loss Medication History Reviewed With Patient   If you are not taking a weight loss medication that was prescribed to you, please indicate why: Other               5/30/2024    12:43 PM   PRITI Score (Last Two)   PRITI Raw Score 32   Activation Score 62.6   PRITI Level 3         PHYSICAL EXAM:  Objective    Ht 1.803 m (5' 11\")   Wt 125.6 kg (277 lb)   LMP 08/01/2016 (Approximate)   BMI 38.63 kg/m      Vitals - Patient Reported  Pain Score: No Pain (0)      Vitals:  No vitals were obtained today due to virtual visit.    GENERAL: alert and no distress  EYES: Eyes grossly normal to inspection.  No discharge or erythema, or obvious scleral/conjunctival abnormalities.  RESP: No audible wheeze, cough, or visible cyanosis.    SKIN: Visible skin clear. No significant rash, abnormal pigmentation or lesions.  NEURO: Cranial nerves grossly intact.  Mentation and speech appropriate for age.  PSYCH: Appropriate affect, tone, and pace of words        Sincerely,    Carole Guo PA-C      41 minutes spent by me on the date of the encounter doing " chart review, history and exam, documentation and further activities per the note    The longitudinal plan of care for the diagnosis(es)/condition(s) as documented were addressed during this visit. Due to the added complexity in care, I will continue to support Jacqui  in the subsequent management and with ongoing continuity of care.

## 2024-09-03 NOTE — LETTER
9/3/2024       RE: Jacqui Grijalva  [address]  [place] [address]     Dear Colleague,    Thank you for referring your patient, Jacqui Grijalva, to the [facility] WEIGHT MANAGEMENT CLINIC [place] at [facility]. Please see a copy of my visit note below.      Return Medical Weight Management Note     Jacqui Grijalva  MRN:  [id number]  :  1965  MERCEDES:  9/3/2024    Dear KAYLIE Latham CNP,    I had the pleasure of seeing your patient Jacqui Grijalva. She is a 59 year old female who I am continuing to see for treatment of obesity related to:        2024     3:41 PM   --   I have the following health issues associated with obesity Sleep Apnea    GERD (Reflux)    Stress Incontinence    Osteoarthritis (joint disease)   I have the following symptoms associated with obesity Knee Pain    Fatigue       Assessment & Plan  Problem List Items Addressed This Visit    None  Visit Diagnoses       History of morbid obesity    -  Primary    Relevant Orders    Comprehensive metabolic panel    Hemoglobin A1c    Lipid panel reflex to direct LDL Fasting    Parathyroid Hormone Intact    Vitamin D Deficiency    Fibrosis Scan           Plan  MTM appointment in 2 weeks to discuss potential wegovy start  Fibroscan ordered to assess for fatty liver.   Alternatives: topiramate, compounded semaglutide   Goals we discussed today:   Increasing daily fiber intake   Labs ordered today: Pth, vitamin d, cmp ordered today   Follow up with Wendy Gonzalez 24   Dietician appointment 9/10/24   Keep up the excellent work!     Anti-obesity medication to be considered in the future for this patient if issues with getting wegovy   TOPIRAMATE  No history of kidney stones  No history of glaucoma   No issues with memory  No chronic kidney disease   .       INTERVAL HISTORY:  New MWM with Wendy Gonzalez  24   Overweight onset after her second pregnancy  "at 37 years old.  Previously weight stable around 200 pounds.  Weight gain has been gradual.  Weight influenced by mental health, especially emotional eating.  Has tried to lose weight in the past 6 months to food logging with no weight loss.  Family history of weight concerns.  Wants to lose weight to be more active and feel better overall.     Discussed AOM's.:  Phentermine can be considered in the future.  Anxiety is well-controlled.  No history of hypertension.  Topiramate can be considered in the future.  No history of kidney stones or disease.  Postmenopausal.  Naltrexone can be considered in the future.  No history of liver disease.  Not taking opioids.  GLP-1 can be considered in the future.  No history of pancreatitis.  No personal or family history of M EN 2 or MTC.  Is a [employer] employee, BMI> 40.    -no meds started   -started working with health . Referrals placed to health psych and get moving program.     Today in visit (9/3/24)  17lbs weight loss since last visit   \"My life has kind of transformed.\"   Working regularly with Meri Greenberg, tracking all food regularly.   Increasing daily activity.   -stopped working with health psychologist as she felt it was a bit of overlap with Meri Greenberg   -worsened hunger with successful weight loss, interested in starting  a weight loss med       MTM pharmacist with Lauren Bloch in 2 weeks to discuss medication options.  Is very interested in starting wegovy.   Experiencing a lot of hunger with her successful weight loss, hoping to start wegovy though we discussed there is some concern with coverage through [employer] insurance as her bmi is now below 40 (bmi 3 months ago 41, has since lost to bmi 38.63 with lifestyle changes). Provider to reach out to MTM pharmacist ahead of time to see if an appeal for wegovy would be possible given that this patient is an excellent candidate and has a recent qualifying bmi.     Discussed starting topiramate or compounded " semaglutide in the mean time before the MTM appointment in 2 weeks, Jacqui stated she prefers to wait until the MTM appointment. Could also consider phentermine in the future though Jacqui had a borderline elevated blood pressure reading at recent visit so it would be ideal to confirm well controlled blood pressures prior to starting.     Wt Readings from Last 5 Encounters:   09/03/24 125.6 kg (277 lb)   08/07/24 127.9 kg (282 lb)   07/18/24 129.3 kg (285 lb)   05/31/24 133.8 kg (295 lb)   05/30/24 133.4 kg (294 lb)       Anti-obesity medication history    Current:   None     Past/Failed/contraindicated:     Recent diet changes: tracking calories regularly, cutting down on snacking. Reducing cookies, chips and crackers. Ongoing issues with hunger.   Hitting 84g average protein daily.   Estimates 17g fiber.     Recent exercise/activity changes: ankle fusion surgery end of December, working regularly with physical therapist, getting to a place where she is finally able to increase her exercise. Was able to go to [employer] fair this last week which was a big accomplishment for her. Busy doing walking, biking, andry, golfing.       Vitamins/Labs: Taking a multivtamin Pth, vitamin d, cmp, lipids, A1c ordered.       CURRENT WEIGHT:   277 lbs 0 oz    Initial Weight (lbs): 294 lbs  Last Visits Weight: 127.9 kg (282 lb)  Cumulative weight loss (lbs): 17  Weight Loss Percentage: 5.78%        9/3/2024     3:27 PM   Changes and Difficulties   I have made the following changes to my diet since my last visit: Meal tracking   With regards to my diet, I am still struggling with: Hunger   I have made the following changes to my activity/exercise since my last visit: Increased exercise to over 210 minutes per week   With regards to my activity/exercise, I am still struggling with: Strength, balance, cardio fitness, foot and knee pain             MEDICATIONS:   Current Outpatient Medications   Medication Sig Dispense Refill      "acetaminophen (TYLENOL) 500 MG tablet Take 500-1,000 mg by mouth every 6 hours as needed for mild pain       bisacodyl (DULCOLAX) 5 MG EC tablet Two days prior to exam take two (2) tablets at 4pm. One day prior to exam take two (2) tablets at 4pm 4 tablet 0     IBUPROFEN PO        melatonin 5 MG tablet Take 5 mg by mouth nightly as needed for sleep       multivitamin w/minerals (THERA-VIT-M) tablet Take 1 tablet by mouth daily       omeprazole (PRILOSEC) 40 MG DR capsule TAKE 1 CAPSULE(40 MG) BY MOUTH DAILY 90 capsule 0     polyethylene glycol (GOLYTELY) 236 g suspension Two days before procedure at 5PM fill first container with water. Mix and drink an 8 oz glass every 10-15 minutes until HALF of the container is gone. Place the remainder in the refrigerator. One day before procedure at 5PM drink second half of bowel prep. Drink an 8 oz glass every 10-15 minutes until it is gone. Day of procedure 6 hours before arrival time fill the 2nd container with water. Mix and drink an 8 oz glass every 10-15 minutes until HALF of the container is gone. Discard the remaining solution. 8000 mL 0     valACYclovir (VALTREX) 1000 mg tablet Take 2 tablets (2,000 mg) by mouth 2 times daily 4 tablet 4           9/3/2024     3:27 PM   Weight Loss Medication History Reviewed With Patient   If you are not taking a weight loss medication that was prescribed to you, please indicate why: Other               5/30/2024    12:43 PM   PRITI Score (Last Two)   PRITI Raw Score 32   Activation Score 62.6   PRITI Level 3         PHYSICAL EXAM:  Objective   Ht 1.803 m (5' 11\")   Wt 125.6 kg (277 lb)   LMP 08/01/2016 (Approximate)   BMI 38.63 kg/m      Vitals - Patient Reported  Pain Score: No Pain (0)      Vitals:  No vitals were obtained today due to virtual visit.    GENERAL: alert and no distress  EYES: Eyes grossly normal to inspection.  No discharge or erythema, or obvious scleral/conjunctival abnormalities.  RESP: No audible wheeze, cough, or " visible cyanosis.    SKIN: Visible skin clear. No significant rash, abnormal pigmentation or lesions.  NEURO: Cranial nerves grossly intact.  Mentation and speech appropriate for age.  PSYCH: Appropriate affect, tone, and pace of words        Sincerely,    Carole Guo PA-C      41 minutes spent by me on the date of the encounter doing chart review, history and exam, documentation and further activities per the note    The longitudinal plan of care for the diagnosis(es)/condition(s) as documented were addressed during this visit. Due to the added complexity in care, I will continue to support Jacqui  in the subsequent management and with ongoing continuity of care.     Virtual Visit Details    Type of service:  Video Visit     Originating Location (pt. Location): Home    Distant Location (provider location):  Off-site  Platform used for Video Visit: AmWell      Again, thank you for allowing me to participate in the care of your patient.      Sincerely,    Carole Guo PA-C

## 2024-09-09 NOTE — PROGRESS NOTES
"Video-Visit Details    Type of service:  Video Visit    Video Start Time:  12:00 PM   Video End Time: 12:32 PM    Originating Location (pt. Location): Home    Distant Location (provider location):  Offsite (providers home) [facility] WEIGHT MANAGEMENT CLINIC [place]     Platform used for Video Visit: 3ROAM      Weight Management Nutrition Consultation    Jacqui Grijalva is a 59 year old female presents today for return weight management nutrition consultation.  Patient referred by Wendy Gonzalez PA-C on May 30, 2024.    Patient with Co-morbidities of obesity includin/29/2024     3:41 PM   --   I have the following health issues associated with obesity Sleep Apnea    GERD (Reflux)    Stress Incontinence    Osteoarthritis (joint disease)   I have the following symptoms associated with obesity Knee Pain    Fatigue       Anthropometrics:  Initial:  Estimated body mass index is 41 kg/m  as calculated from the following:    Height as of 24: 1.803 m (5' 11\").    Weight as of 24: 133.4 kg (294 lb).    Current:  Estimated body mass index is 38.49 kg/m  as calculated from the following:    Height as of this encounter: 1.803 m (5' 11\").    Weight as of this encounter: 125.2 kg (276 lb). (-9 lbs from last RD visit, -18 lbs from initial)       Medications for Weight Loss:  None     NUTRITION HISTORY  Food allergies: None  Food intolerances: spicy foods   Vitamin/Mineral Supplements: MVI   Previous methods of diet modification for weight loss: In last 6 months, started tracking intermittently in [employer] mariela. Identifies needing to moderate carb intake.   Working 8-4, M-F  Doesn't love to cook  Tries not to drink calories  Identifies over eating at night. Comfort eating - stress management, reward.   Pt goals - decrease carb intake, decrease binge eating, healthier swaps for snacking     24- Doing very well with diet change. Consistently tracking in [employer] mariela. Average calorie intake of 1600 " tamie/day, 85 gm protein/day, 16 gm fiber/day.  If dining out, looking for healthier options.  Significantly reduced snacking, also if having portioning out and much more conscious.    Significantly increased exercise  Potential challenges - needing more variety, higher cost of protein foods    Today - Working on increasing fiber intake Has tracked consistently for 100 days. Some days more difficult to track - busy schedule, or knows she will go over calorie goal.   Eating three meals daily.     Recent Diet Recall (Work days M-F):  Breakfast: sugar-free energy drink and Pure/Atkins protein bar; occ a protein shake; cheese stick; yogurt   Lunch: 12 pm cafeteria at work - Grain bowl; salad; soup; burrito bowl   Occ piece of chocolate   Dinner: higher protein frozen meals;   Snacks: protein, fruit and veggies  Beverages: Water, Celsius, black iced coffee, unsweet iced tea.  Alcohol: two glass wine per week   Dining Out: Once weekly dining out for dinner.     Progress Towards Previous Goals:  1) Follow <1600 calorie/day plan in [employer] mariela. - Met, continues to track daily. Consuming 5389-0410 tamie  Protein: 85+ gm daily (20+% of total calories) - Met, continues average intake of 83 gm/d  Carb: 45-60 gm per meal, 15-30 gm per snack or less. (30-50% of total calories)   Fat: 60-85 gm daily (30-40% of total calories)  Fiber: 25+ gm - Improving, up to average 18 gm per day.     Physical Activity:  Exercise: 210-315 mins per week - PT for ankle, biking, walking, golfing, WellBeats exercises, little of weight lifting.    Golfing 1-2 times weekly.     Nutrition Prescription  Recommended energy/nutrient modification.  <1600 calories/day     Nutrition Diagnosis  Obesity r/t long history of positive energy balance aeb BMI >30. - Improving    Nutrition Intervention  Materials/education provided on hypocaloric diet for weight loss.   Reviewed progress towards previous goals.   Discussed continuing to aim for an average of 1600  calorie/day for continued weight loss.   Discussed increasing fiber to 25 gm daily.   Planning to start GLP1, discussed nutrition considerations.   Discussed micronutrients at increased risk for deficiency and strategies for optimizing intake.   Co-developed goals to work towards.   Provided pt with list of goals and resources below via [employer].     Expected Engagement: good  Follow-Up Plans: pt would like to discuss supplements    Nutrition Goals  1) Follow <1600 calorie/day plan in Lose It mariela.   Protein: 85+ gm daily (20+% of total calories)   Carb: 45-60 gm per meal, 15-30 gm per snack or less. (30-50% of total calories)   Fat: 60-85 gm daily (30-40% of total calories)  Fiber: 25+ gm   2) Track exercise frequency, duration and intensity.       CALCIUM  Recommendations:  Teenagers and premenopausal women: 1200 mg/day  Pregnant and Lactating women: 1500 mg/day  Men and Postmenopausal women on estrogen: 1200mg/day  Postmenopausal women not on estrogen: 1500 mg/day    If you are not eating dairy products you also need 400 IU of vitamin D per day which can be obtained in either a multivitamin or in some of the Calcium tablets.    Dietary sources: These also contain vitamin D  Milk                            8 oz            300 mg  Yogurt                          1 cup           400 mg  Hard cheese                     1.5 oz          300 mg  Cottage cheese                  2 cup           300 mg  Orange juice with Calcium       8 oz            300 mg  Low fat dairy sources are recommended    Supplements:  Tums EX                         300 mg  Tums Ultra                      400 mg  Caltrate 600                    600 mg  Oscal                           500 mg  Oscal/D                         500 mg plus vitamin D  Women's Formula Multivitamin    450 mg     Quick/Easy Protein Sources:  Hard boiled eggs  Part-skim cheese sticks  Baby Bell cheese rounds  Low-fat/low-sugar Greek yogurt  Low-fat cottage cheese  Lean deli  "meat (chicken/turkey/ham)  Roasted chickpeas or lentils  Nuts   Turkey meat stick  Protein shake/bar  \"P3\" snack (cheese, nuts, deli meat)  Aldi's \"Protein Bread\"   \"Egglife\" egg white wrap    Tuna/salmon can/packet     Protein Supplements for After Surgery:   Unflavored protein powder: Genepro, Isopure (25-30 gm protein per 1 scoop); Vital Proteins collagen powder (1 tbsp = 5 gm pro)  You may also use flavored protein powder if you prefer.   Protein shots: https://store.[employer]/collections/protein-shots  Pre-made protein shakes (no more than 210 Calories, at least 20 grams of protein, and less than 10 grams of sugar):   Premier Protein (160 Calories, 30 g protein)  Boost/Ensure Max (160 calories, 30 gm protein)   Fairlife Core Power (170 calories, 26 gm protein)  [employer]i's Elevation Protein Shake (160 calories, 30 gm protein)   Equate Protein Shake (160 calories, 30 gm protein)  Slim Fast Advanced Nutrition (180 Calories, 20 g protein)  Muscle Milk, lactose-free, 17 oz bottle (210 Calories, 30 g protein)  Glucerna Protein Smart (150 tamie, 30 gm protein)  Clear Protein Drinks:  BiPro  Premier Protein clear  [id number]  M Health Protein 15 Concentrate  Bone broth  Beneprotein protein powder mixed with 4-6 oz of fluid  [id number]  Gatorade Zero with Protein   Vital Proteins collagen powder mixed with clear fluid            Follow-Up:  10/31    Time spent with patient: 32 minutes.  Ann Marie Bermudez, ROSEMARIE, LD      "

## 2024-09-10 ENCOUNTER — VIRTUAL VISIT (OUTPATIENT)
Dept: ENDOCRINOLOGY | Facility: CLINIC | Age: 59
End: 2024-09-10
Payer: COMMERCIAL

## 2024-09-10 VITALS — HEIGHT: 71 IN | BODY MASS INDEX: 38.64 KG/M2 | WEIGHT: 276 LBS

## 2024-09-10 DIAGNOSIS — Z71.3 NUTRITIONAL COUNSELING: Primary | ICD-10-CM

## 2024-09-10 DIAGNOSIS — E66.9 OBESITY: ICD-10-CM

## 2024-09-10 PROCEDURE — 99207 PR NO CHARGE LOS: CPT | Mod: 95 | Performed by: DIETITIAN, REGISTERED

## 2024-09-10 PROCEDURE — 97803 MED NUTRITION INDIV SUBSEQ: CPT | Mod: 95 | Performed by: DIETITIAN, REGISTERED

## 2024-09-10 ASSESSMENT — PAIN SCALES - GENERAL: PAINLEVEL: NO PAIN (0)

## 2024-09-10 NOTE — LETTER
"9/10/2024       RE: Jacqui Grijalva  [address]  [place] [address]     Dear Colleague,    Thank you for referring your patient, Jacqui Grijalva, to the [facility] WEIGHT MANAGEMENT CLINIC [place] at [facility]. Please see a copy of my visit note below.    Video-Visit Details    Type of service:  Video Visit    Video Start Time:  12:00 PM   Video End Time: 12:32 PM    Originating Location (pt. Location): Home    Distant Location (provider location):  Offsite (providers home) [facility] WEIGHT MANAGEMENT CLINIC [place]     Platform used for Video Visit: [employer]      Weight Management Nutrition Consultation    Jacqui Grijalva is a 59 year old female presents today for return weight management nutrition consultation.  Patient referred by Wendy Gonzalez PA-C on May 30, 2024.    Patient with Co-morbidities of obesity includin/29/2024     3:41 PM   --   I have the following health issues associated with obesity Sleep Apnea    GERD (Reflux)    Stress Incontinence    Osteoarthritis (joint disease)   I have the following symptoms associated with obesity Knee Pain    Fatigue       Anthropometrics:  Initial:  Estimated body mass index is 41 kg/m  as calculated from the following:    Height as of 24: 1.803 m (5' 11\").    Weight as of 24: 133.4 kg (294 lb).    Current:  Estimated body mass index is 38.49 kg/m  as calculated from the following:    Height as of this encounter: 1.803 m (5' 11\").    Weight as of this encounter: 125.2 kg (276 lb). (-9 lbs from last RD visit, -18 lbs from initial)       Medications for Weight Loss:  None     NUTRITION HISTORY  Food allergies: None  Food intolerances: spicy foods   Vitamin/Mineral Supplements: MVI   Previous methods of diet modification for weight loss: In last 6 months, started tracking intermittently in [employer] mariela. Identifies needing to moderate carb intake.   Working 8-4, " M-F  Doesn't love to cook  Tries not to drink calories  Identifies over eating at night. Comfort eating - stress management, reward.   Pt goals - decrease carb intake, decrease binge eating, healthier swaps for snacking     7/18/24- Doing very well with diet change. Consistently tracking in [employer] mariela. Average calorie intake of 1600 tamie/day, 85 gm protein/day, 16 gm fiber/day.  If dining out, looking for healthier options.  Significantly reduced snacking, also if having portioning out and much more conscious.    Significantly increased exercise  Potential challenges - needing more variety, higher cost of protein foods    Today - Working on increasing fiber intake Has tracked consistently for 100 days. Some days more difficult to track - busy schedule, or knows she will go over calorie goal.   Eating three meals daily.     Recent Diet Recall (Work days M-F):  Breakfast: sugar-free energy drink and Pure/Atkins protein bar; occ a protein shake; cheese stick; yogurt   Lunch: 12 pm cafeteria at work - Grain bowl; salad; soup; burrito bowl   Occ piece of chocolate   Dinner: higher protein frozen meals;   Snacks: protein, fruit and veggies  Beverages: Water, Celsius, black iced coffee, unsweet iced tea.  Alcohol: two glass wine per week   Dining Out: Once weekly dining out for dinner.     Progress Towards Previous Goals:  1) Follow <1600 calorie/day plan in [employer] mariela. - Met, continues to track daily. Consuming [id number] tamie  Protein: 85+ gm daily (20+% of total calories) - Met, continues average intake of 83 gm/d  Carb: 45-60 gm per meal, 15-30 gm per snack or less. (30-50% of total calories)   Fat: 60-85 gm daily (30-40% of total calories)  Fiber: 25+ gm - Improving, up to average 18 gm per day.     Physical Activity:  Exercise: 210-315 mins per week - PT for ankle, biking, walking, golfing, Well[employer] exercises, little of weight lifting.    Golfing 1-2 times weekly.     Nutrition Prescription  Recommended  energy/nutrient modification.  <1600 calories/day     Nutrition Diagnosis  Obesity r/t long history of positive energy balance aeb BMI >30. - Improving    Nutrition Intervention  Materials/education provided on hypocaloric diet for weight loss.   Reviewed progress towards previous goals.   Discussed continuing to aim for an average of 1600 calorie/day for continued weight loss.   Discussed increasing fiber to 25 gm daily.   Planning to start GLP1, discussed nutrition considerations.   Discussed micronutrients at increased risk for deficiency and strategies for optimizing intake.   Co-developed goals to work towards.   Provided pt with list of goals and resources below via [employer].     Expected Engagement: good  Follow-Up Plans: pt would like to discuss supplements    Nutrition Goals  1) Follow <1600 calorie/day plan in Lose It mariela.   Protein: 85+ gm daily (20+% of total calories)   Carb: 45-60 gm per meal, 15-30 gm per snack or less. (30-50% of total calories)   Fat: 60-85 gm daily (30-40% of total calories)  Fiber: 25+ gm   2) Track exercise frequency, duration and intensity.       CALCIUM  Recommendations:  Teenagers and premenopausal women: 1200 mg/day  Pregnant and Lactating women: 1500 mg/day  Men and Postmenopausal women on estrogen: 1200mg/day  Postmenopausal women not on estrogen: 1500 mg/day    If you are not eating dairy products you also need 400 IU of vitamin D per day which can be obtained in either a multivitamin or in some of the Calcium tablets.    Dietary sources: These also contain vitamin D  Milk                            8 oz            300 mg  Yogurt                          1 cup           400 mg  Hard cheese                     1.5 oz          300 mg  Cottage cheese                  2 cup           300 mg  Orange juice with Calcium       8 oz            300 mg  Low fat dairy sources are recommended    Supplements:  Tums EX                         300 mg  Tums Ultra                      400  "mg  Caltrate 600                    600 mg  Oscal                           500 mg  Oscal/D                         500 mg plus vitamin D  Women's Formula Multivitamin    450 mg     Quick/Easy Protein Sources:  Hard boiled eggs  Part-skim cheese sticks  Baby Bell cheese rounds  Low-fat/low-sugar Greek yogurt  Low-fat cottage cheese  Lean deli meat (chicken/turkey/ham)  Roasted chickpeas or lentils  Nuts   Turkey meat stick  Protein shake/bar  \"P3\" snack (cheese, nuts, deli meat)  Aldi's \"Protein Bread\"   \"Egglife\" egg white wrap    Tuna/salmon can/packet     Protein Supplements for After Surgery:   Unflavored protein powder: Genepro, Isopure (25-30 gm protein per 1 scoop); Vital Proteins collagen powder (1 tbsp = 5 gm pro)  You may also use flavored protein powder if you prefer.   Protein shots: https://store.[employer]/collections/protein-shots  Pre-made protein shakes (no more than 210 Calories, at least 20 grams of protein, and less than 10 grams of sugar):   Premier Protein (160 Calories, 30 g protein)  Boost/Ensure Max (160 calories, 30 gm protein)   Fairlife Core Power (170 calories, 26 gm protein)  Aldi's Elevation Protein Shake (160 calories, 30 gm protein)   Equate Protein Shake (160 calories, 30 gm protein)  Slim Fast Advanced Nutrition (180 Calories, 20 g protein)  Muscle Milk, lactose-free, 17 oz bottle (210 Calories, 30 g protein)  Glucerna Protein Smart (150 tamie, 30 gm protein)  Clear Protein Drinks:  BiPro  Premier Protein clear  [id number]  M Health Protein 15 Concentrate  Bone broth  Beneprotein protein powder mixed with 4-6 oz of fluid  [id number]  Gatorade Zero with Protein   Vital Proteins collagen powder mixed with clear fluid            Follow-Up:  10/31    Time spent with patient: 32 minutes.  Ann Marie Bermudez RD, LD        Again, thank you for allowing me to participate in the care of your patient.      Sincerely,    Ann Marie Bermudez RD    "

## 2024-09-10 NOTE — PATIENT INSTRUCTIONS
"Rinku Osman,     Follow-up with RD on 10/31    Thank you,    Ann Marie Bermudez, RD, LD  If you would like to schedule or reschedule an appointment with the RD, please call [phone]    Nutrition Goals  1) Follow <1600 calorie/day plan in Lose It mariela.   Protein: 85+ gm daily (20+% of total calories)   Carb: 45-60 gm per meal, 15-30 gm per snack or less. (30-50% of total calories)   Fat: 60-85 gm daily (30-40% of total calories)  Fiber: 25+ gm   2) Track exercise frequency, duration and intensity.       CALCIUM  Recommendations:  Teenagers and premenopausal women: 1200 mg/day  Pregnant and Lactating women: 1500 mg/day  Men and Postmenopausal women on estrogen: 1200mg/day  Postmenopausal women not on estrogen: 1500 mg/day    If you are not eating dairy products you also need 400 IU of vitamin D per day which can be obtained in either a multivitamin or in some of the Calcium tablets.    Dietary sources: These also contain vitamin D  Milk                            8 oz            300 mg  Yogurt                          1 cup           400 mg  Hard cheese                     1.5 oz          300 mg  Cottage cheese                  2 cup           300 mg  Orange juice with Calcium       8 oz            300 mg  Low fat dairy sources are recommended    Supplements:  Tums EX                         300 mg  Tums Ultra                      400 mg  Caltrate 600                    600 mg  Oscal                           500 mg  Oscal/D                         500 mg plus vitamin D  Women's Formula Multivitamin    450 mg     Quick/Easy Protein Sources:  Hard boiled eggs  Part-skim cheese sticks  Baby Bell cheese rounds  Low-fat/low-sugar Greek yogurt  Low-fat cottage cheese  Lean deli meat (chicken/turkey/ham)  Roasted chickpeas or lentils  Nuts   Turkey meat stick  Protein shake/bar  \"P3\" snack (cheese, nuts, deli meat)  Aldi's \"Protein Bread\"   \"Egglife\" egg white wrap    Tuna/salmon can/packet     Protein Supplements for After " Surgery:   Unflavored protein powder: Genepro, Isopure (25-30 gm protein per 1 scoop); Vital Proteins collagen powder (1 tbsp = 5 gm pro)  You may also use flavored protein powder if you prefer.   Protein shots: https://[email or web address].com/collections/protein-shots  Pre-made protein shakes (no more than 210 Calories, at least 20 grams of protein, and less than 10 grams of sugar):   Premier Protein (160 Calories, 30 g protein)  Boost/Ensure Max (160 calories, 30 gm protein)   Fairlife Core Power (170 calories, 26 gm protein)  Aldi's Elevation Protein Shake (160 calories, 30 gm protein)   Equate Protein Shake (160 calories, 30 gm protein)  Slim Fast Advanced Nutrition (180 Calories, 20 g protein)  Muscle Milk, lactose-free, 17 oz bottle (210 Calories, 30 g protein)  Glucerna Protein Smart (150 tamie, 30 gm protein)  Clear Protein Drinks:  BiPro  Premier Protein clear  [id number]  [facility] Protein 15 Concentrate  Bone broth  Beneprotein protein powder mixed with 4-6 oz of fluid  [id number]  Gatorade Zero with Protein   Vital Proteins collagen powder mixed with clear fluid            COMPREHENSIVE WEIGHT MANAGEMENT PROGRAM  VIRTUAL SUPPORT GROUPS    At [facility], our Comprehensive Weight Management program offers on-line support groups for patients who are working on weight loss and considering, preparing for, or have had weight loss surgery.     There is no cost for this opportunity.  You are invited to attend the?Virtual Support Groups?provided by any of the following locations:    [facility] via [employer] Teams with Simi Leon RN  2.   [place] via Worl[employer] Teams with Anthony Fermin, PhD, LP  3.   [place] via Wor[employer] Teams with Meri Cleary RN  4.   [facility] via a Zoom Meeting with NICHELLE Yates-    The following Support Group information can also be found on our website:  [email or web address]        "[facility] Weight Loss Surgery Support Group  The support group is a patient-lead forum that meets monthly to share experiences, encouragement and education. It is open to those who have had weight loss surgery, are scheduled for surgery, or are considering surgery.   WHEN: This group meets on the 3rd Wednesday of each month from 5:00PM - 6:00PM virtually using Microsoft Teams.   FACILITATOR: Led by Simi Jefferson RD, LD, RN, the program's Clinical Coordinator.   TO REGISTER: Please contact the clinic via [employer] or call the nurse line directly at [phone] to inform our staff that you would like an invite sent to you and to let us know the email you would like the invite sent to. Prior to the meeting, a link with directions on how to join the meeting will be sent to you.    2023 and 2024 Meetings   December 20  January 17  February 21  March 20  April 17  May 15  Zoë 19      [facility] Bariatric Care Support Group?  This is open to all pre- and post- operative bariatric surgery patients as well as their support system.   WHEN: This group meets the 3rd Tuesday of each month from 6:30 PM - 8:00 PM virtually using Microsoft Teams.   FACILITATOR: Led by Anthony Fermin, Ph.D who is a Licensed Psychologist with the [facility] Comprehensive Weight Management Program.   TO REGISTER: Please send an email to Anthony Fermin, Ph.D., LP [email or web address]?if you would like an invitation to the group. Prior to the meeting, a link with directions on how to join the meeting will be sent to you.    2023 and 2024 Meetings  December 19 January 16: \"Medication Management and Bariatric Surgery\", Bobbi Masterson, PharmD, Pharmacy Resident at [facility]  February 20: \"A Bariatric Surgery Patient's Perspective\", RENETTA Cho, [facility], Behavioral Health Clinician at [facility]  March 19  April 16  May " "21 June 18: \"Nutritional Labeling\", Dietitian speaker to be announced.  November 19: \"Holiday Eating\", Dietitian speaker to be announced.    [facility] Post-Operative Bariatric Surgery Support Group  This is a support group for [facility] bariatric patients (and those external to [facility]) who have had bariatric surgery and are at least 3 months post-surgery.  WHEN: This support group meets the 4th Wednesday of the month from 11:00 AM - 12:00 PM virtually using Microsoft Teams.   FACILITATOR: Led by Certified Bariatric Nurse, Meri Cleary RN.   TO REGISTER: Please send an email to Meri at [email or web address] if you would like an invitation to the group.  Prior to the meeting, a link with directions on how to join the meeting will be sent to you.    2023 and 2024 Meetings  December 27  January 24  February 28  March 27  April 24  May 22  Zoë 26    [facility] Healthy Lifestyle Group?  This is a 60 minute virtual coaching group for those who want to lead a healthier lifestyle. Come together to set goals and overcome barriers in a supportive group environment. We will address the four pillars of health: nutrition, exercise, sleep and emotional well-being.  This group is highly recommended for those who are participating in the 24 week Healthy Lifestyle Plan and our Health Coaching sessions.  WHEN: This group meets the 1st Friday of the month, 12:30 PM - 1:30 PM online, via a zoom meeting.    FACILITATOR: Led by National Board Certified Health and , Meri Greenberg [facility].   TO REGISTER: Please call the Call Center at [phone] to register.  You will get an appointment to attend in [place]. Fifteen minutes prior to the meeting, complete the e-check in and you will get the link to join the meeting.    There is no charge to attend this group and space is limited.     2023 and 2024 " "Meetings  December 1: \"Let's Talk\" (guided discussion on our wins and challenges)  January 5: \"New Years Vision: Manifest your Best 2024!\" (guided imagery,  journaling and discussion)  February 2: \"Let's Talk\"  March 1: \"10 Percent Happier\" by Vel Hagan (Book Bites - a guided discussion on the nuggets of wisdom from favorite wellness books, no need to read the book but highly encouraged)  April 5: \"Let's Talk\"  May 3: \"Essentialism: The Disciplined Pursuit of Less\" by Giovanny Montoya (Book Bites discussion)  June 7: \"Let's Talk\"  July 5: NO MEETING, off for the 4th of July Holiday  August 2: \"The Blue Zones, Secrets for Living a Longer Life\" by Vel Florian (Book Bites discussion)                    "

## 2024-09-10 NOTE — NURSING NOTE
Current patient location: [facility]    Is the patient currently in the state of [place]? YES    Visit mode:VIDEO    If the visit is dropped, the patient can be reconnected by: VIDEO VISIT: Text to cell phone:   Telephone Information:   Mobile [phone]       Will anyone else be joining the visit? NO  (If patient encounters technical issues they should call [phone]    How would you like to obtain your AVS? MyChart    Are changes needed to the allergy or medication list? N/A    Are refills needed on medications prescribed by this physician? NO    Rooming Documentation:  Not applicable    Reason for visit: RECHECK    Wt other than 24 hrs:    Pain more than one location:  no  Patricia LOCKETTF

## 2024-09-13 ENCOUNTER — VIRTUAL VISIT (OUTPATIENT)
Dept: ENDOCRINOLOGY | Facility: CLINIC | Age: 59
End: 2024-09-13

## 2024-09-13 DIAGNOSIS — E66.3 OVERWEIGHT: Primary | ICD-10-CM

## 2024-09-13 PROCEDURE — 99207 PR NO CHARGE LOS: CPT | Mod: 95

## 2024-09-13 NOTE — LETTER
"9/13/2024       RE: Jacqui Grijalva  [address]  [place] [address]     Dear Colleague,    Thank you for referring your patient, Jacqui Grijalva, to the [facility] [place] at [facility]. Please see a copy of my visit note below.    24 wk visit 5  Video  Wendy Agosto    NOTES/Goals 9/13:    Visit note from Aug 23:  CW: 279 lb; 15 lbs lost so far  Not taking any med; may take wegovy  I am already changing my life now.      GOALS: August 23  210 minutes of exercise per week  Reminder: Surround myself with positive healthy minded people  Ask about measurements at my next visit; and total body weight  Kind \"parental\" self talk; no shame, loving and encouraging, firm, setting healthy  Boundaries for my self  Look into a walking tate for after work 1-2 times per week; look into andry class;   Needs to be close to my house and convenient    GOALS: SEPT 13:  Acknowledge my success of losing 25 lbs (275 lb today) with lifestyle changes alone and keep on my path  Pharmacist apt next week  Get Moving Program Starts Monday  Continue my 210 min of exercise per week   Search for an in person Andry class; the community will be great for me     NEXT HEALTH  VISIT: Oct 10 at 10:30 am video      Meri STEWARD-HW, National Board Certified Health &   Lead Health   [facility] Comprehensive Weight Management  [email or web address]   To schedule: [phone]   Gender pronouns: she/her                                                Again, thank you for allowing me to participate in the care of your patient.      Sincerely,    Meri Greenberg    "

## 2024-09-13 NOTE — PROGRESS NOTES
"24 wk visit 5  Video  Wendy Agosto    NOTES/Goals 9/13:    Visit note from Aug 23:  CW: 279 lb; 15 lbs lost so far  Not taking any med; may take wegovy  I am already changing my life now.      GOALS: August 23  210 minutes of exercise per week  Reminder: Surround myself with positive healthy minded people  Ask about measurements at my next visit; and total body weight  Kind \"parental\" self talk; no shame, loving and encouraging, firm, setting healthy  Boundaries for my self  Look into a walking tate for after work 1-2 times per week; look into andry class;   Needs to be close to my house and convenient    GOALS: SEPT 13:  Acknowledge my success of losing 25 lbs (275 lb today) with lifestyle changes alone and keep on my path  Pharmacist apt next week  Get Moving Program Starts Monday  Continue my 210 min of exercise per week   Search for an in person Andry class; the community will be great for me     NEXT HEALTH  VISIT: Oct 10 at 10:30 am video      Meri Greenberg  [facility], National Board Certified Health &   Lead Health   [facility] Comprehensive Weight Management  [email or web address]  [email or web address]   To schedule: [phone]   Gender pronouns: she/her                                            "

## 2024-09-16 ENCOUNTER — THERAPY VISIT (OUTPATIENT)
Dept: PHYSICAL THERAPY | Facility: CLINIC | Age: 59
End: 2024-09-16
Payer: COMMERCIAL

## 2024-09-16 DIAGNOSIS — M17.31 POST-TRAUMATIC OSTEOARTHRITIS OF RIGHT KNEE: Primary | ICD-10-CM

## 2024-09-16 PROCEDURE — 97110 THERAPEUTIC EXERCISES: CPT | Mod: GP | Performed by: PHYSICAL THERAPIST

## 2024-09-16 PROCEDURE — 97530 THERAPEUTIC ACTIVITIES: CPT | Mod: GP | Performed by: PHYSICAL THERAPIST

## 2024-09-19 ENCOUNTER — TELEPHONE (OUTPATIENT)
Dept: ENDOCRINOLOGY | Facility: CLINIC | Age: 59
End: 2024-09-19

## 2024-09-19 ENCOUNTER — VIRTUAL VISIT (OUTPATIENT)
Dept: CARDIOLOGY | Facility: CLINIC | Age: 59
End: 2024-09-19
Payer: COMMERCIAL

## 2024-09-19 VITALS — HEIGHT: 71 IN | BODY MASS INDEX: 38.5 KG/M2 | WEIGHT: 275 LBS

## 2024-09-19 DIAGNOSIS — E66.01 CLASS 3 SEVERE OBESITY WITH SERIOUS COMORBIDITY AND BODY MASS INDEX (BMI) OF 40.0 TO 44.9 IN ADULT, UNSPECIFIED OBESITY TYPE (H): Primary | ICD-10-CM

## 2024-09-19 DIAGNOSIS — Z78.9 TAKES DIETARY SUPPLEMENTS: ICD-10-CM

## 2024-09-19 DIAGNOSIS — K21.9 GERD WITHOUT ESOPHAGITIS: ICD-10-CM

## 2024-09-19 DIAGNOSIS — G47.00 TROUBLE STAYING ASLEEP: ICD-10-CM

## 2024-09-19 DIAGNOSIS — E66.813 CLASS 3 SEVERE OBESITY WITH SERIOUS COMORBIDITY AND BODY MASS INDEX (BMI) OF 40.0 TO 44.9 IN ADULT, UNSPECIFIED OBESITY TYPE (H): Primary | ICD-10-CM

## 2024-09-19 RX ORDER — SEMAGLUTIDE 0.25 MG/.5ML
0.25 INJECTION, SOLUTION SUBCUTANEOUS WEEKLY
Qty: 2 ML | Refills: 0 | Status: SHIPPED | OUTPATIENT
Start: 2024-09-19

## 2024-09-19 RX ORDER — CALCIUM CARBONATE/VITAMIN D3 600 MG-10
1 TABLET ORAL DAILY
COMMUNITY

## 2024-09-19 ASSESSMENT — PAIN SCALES - GENERAL: PAINLEVEL: NO PAIN (0)

## 2024-09-19 NOTE — PROGRESS NOTES
Medication Therapy Management (MTM) Encounter    ASSESSMENT:                            Medication Adherence/Access: No issues identified    Weight Management   Patient would benefit from starting Wegovy for Weight Management.  Patient meets guideline criteria of BMI > 30 to start GLP 1 agonist for Weight Management.  As patient met clearscript prescription BMI criteria at time of referral and MTM being booked out, will go by patient starting BMI at time of referral which was greater than 40.  Patient should not be prevented access to medication because she had made meaningful change in her health aspects via diet, eating behaviors and exercise in the meantime.  For patients that are under [employer] insurance coverage, it is mandated by insurance that each qualifying patient meet with hospital based Weight Management Medication Therapy Management pharmacist to continue therapy coverage. The following patient meets the below coverage criteria and can therefore continue GLP-1/GIP agonist therapy for Weight Management:    Adult  BMI >40 with or without comorbidities   OR   BMI >30 + NAFLD*   at time of initiating GLP-1/GIP agonist therapy Approved for 6 months Met Updated Initial Criteria   At least 5% weight loss of baseline body weight  Approved for 12 months      GERD:   Would benefit from using famotidine 10-20 mg as needed rather than omeprazole as needed.     Insomnia   To complete Sleep study.     Supplements   Stable.     PLAN:                            Pharmacist to start Prior Authorization on Wegovy. Once approved, to start Wegovy 0.25 mg subcutaneous once weekly for 4 weeks, then if tolerating increase to 0.5 mg weekly thereafter.  RX will be sent to [facility] for both 0.25 mg and 0.5 mg doses. The 0.25 mg dose will be filled. The 0.5 mg dose will be put on hold. Call the pharmacy when ready to fill the 0.5 mg box.   2.  Do not need to be on omeprazole.  Can  use famotidine (Pepcid) 10 to 20 mg as needed for heartburn.  3.  Complete sleep study as planned.  4.  Follow-up with health , dietitian, Wendy Agosto as planned    Follow-up: Return in about 4 months (around 1/15/2025).    SUBJECTIVE/OBJECTIVE:                          Jacqui Grijalva is a 59 year old female seen for an initial visit. She was referred to me from Carole Guo PA-C.      Reason for visit: [facility]script [facility] insurance - starting Wegovy.    Allergies/ADRs: Reviewed in chart  Past Medical History: Reviewed in chart  Tobacco: She reports that she has never smoked. She has never used smokeless tobacco.  Alcohol: decreased alcohol consumption significantly, now 2-4 beverages/week    Medication Adherence/Access: no issues reported    Weight Management   No medications     Follows Carole Guo PA-C for [facility]. Patient was referred 6/2024 for GLP-1 agonist for Weight Management but due to MTM booking out was not able to get in until today. She has made significant dietary/lifestyle changes in the meantime. Patient has no history of pancreatitis. Patient has no personal or family history of medullary thyroid carcinoma or MEN2. She reports she is hungry between meals, working on portion control and modification of diet in the last months.  Working with Meri Greenberg, health . Patient is having fibroscan completed.   Nutrition/Eating Habits: working with dietitian, Ann Marie Bermudez. No skipping of meals. She has snacks between meals, trying to make more nourishing choices. Working on being more protein/fiber forward.   Exercise/Activity: significant change - ankle fusion surgery 12/2023. Because of this she no longer has ankle pain. She has been doing PT. Walking, biking, golfing effectively.      Initial Consult Weight: 294 lb      Current weight today: 275 lbs 0 oz  Cumulative Weight Loss: -19 lb, -6.5% from baseline    Wt Readings from Last 4 Encounters:   09/19/24  "124.7 kg (275 lb)   09/10/24 125.2 kg (276 lb)   09/03/24 125.6 kg (277 lb)   08/07/24 127.9 kg (282 lb)     Estimated body mass index is 38.37 kg/m  as calculated from the following:    Height as of this encounter: 1.803 m (5' 10.98\").    Weight as of this encounter: 124.7 kg (275 lb).     Latest Ref Rng 5/31/2024  9:39 AM   Weight  295 lbs    Height  5' 11\"    BP     Pulse     BMI (Calculated)  41.14        GERD:   Omeprazole 40 mg prescribed once daily, taking 40 mg once every couple of weeks.     She finds with dietary changes she has made over the last 3 months that she hasn't needed omeprazole daily and now taking every couple week.   Patient reports no current symptoms.   Patient feels that current regimen is somewhat effective when taking.     Insomnia   melatonin 5 mg at bedtime    Melatonin helpful to fall asleep but still having issues staying asleep.   She reports that she has been told she snores and mouth breather. Having sleep study.     Supplements   Multivitamin daily   Calcium-vitamin D 1 tablet daily     No reported issues at this time.        Today's Vitals: Ht 1.803 m (5' 10.98\")   Wt 124.7 kg (275 lb)   LMP 08/01/2016 (Approximate)   BMI 38.37 kg/m    ----------------    I spent 30 minutes with this patient today. All changes were made via collaborative practice agreement with Carole Guo PA-C. A copy of the visit note was provided to the patient's provider(s).    A summary of these recommendations was sent via [employer].    Lauren Bloch, PharmD, BCACP   Medication Therapy Management Pharmacist   [facility]    Telemedicine Visit Details  Type of service:  Telephone visit  Start Time:  10:14 AM  End Time:  10:44 AM     Medication Therapy Recommendations  Class 3 severe obesity with serious comorbidity and body mass index (BMI) of 40.0 to 44.9 in adult, unspecified obesity type (H)    Rationale: Untreated condition - Needs additional medication " therapy - Indication   Recommendation: Start Medication - Wegovy 0.25 MG/0.5ML Soaj   Status: Accepted per CPA         GERD (gastroesophageal reflux disease)    Current Medication: omeprazole (PRILOSEC) 40 MG DR capsule (Discontinued)   Rationale: No medical indication at this time - Unnecessary medication therapy - Indication   Recommendation: Change Medication - famotidine 20 MG tablet   Status: Accepted per CPA

## 2024-09-19 NOTE — TELEPHONE ENCOUNTER
Prior Authorization Retail Medication Request    Medication/Dose: Wegovy  Diagnosis and ICD code (if different than what is on RX):    New/renewal/insurance change PA/secondary ins. PA:  Previously Tried and Failed:  diet and exercise for at least 3 months without clinically effective sustainable weight loss  Rationale: Jacqui Grijalva is a 59 year old female with a diagnosis of Class III Obesity (BMI at least 40 kg/m2) and The patient has met with an MTM pharmacist at the [facility].. Looking to start wegovy for Weight Management.     Insurance   Primary:   Insurance ID:      Secondary (if applicable):  Insurance ID:      Pharmacy Information (if different than what is on RX)  Name:    Phone:    Fax:

## 2024-09-19 NOTE — NURSING NOTE
Current patient location:  [facility]    Is the patient currently in the state of [place]? YES    Visit mode:TELEPHONE    If the visit is dropped, the patient can be reconnected by: TELEPHONE VISIT: Phone number: [phone]    Will anyone else be joining the visit? NO  (If patient encounters technical issues they should call [phone])    How would you like to obtain your AVS? MyChart    Are changes needed to the allergy or medication list? Pt stated no changes to allergies and Pt stated no med changes    Are refills needed on medications prescribed by this physician? NO    Reason for visit: Medication Therapy Management    Cristina Perales VVF

## 2024-09-19 NOTE — LETTER
9/19/2024      RE: Jacqui Grijalva  [address]  [place] [address]       Dear Colleague,    Thank you for the opportunity to participate in the care of your patient, Jacqui Grijalva, at the [facility] [place] at [facility]. Please see a copy of my visit note below.    Medication Therapy Management (MTM) Encounter    ASSESSMENT:                            Medication Adherence/Access: No issues identified    Weight Management   Patient would benefit from starting Wegovy for Weight Management.  As patient met clear prescription BMI criteria at time of referral and MTM being booked out, will go by patient starting BMI at time of referral which was greater than 40.  Patient should not be prevented access to medication because she had made meaningful change in her health aspects via diet, eating behaviors and exercise in the meantime.  For patients that are under [employer] insurance coverage, it is mandated by insurance that each qualifying patient meet with hospital based Weight Management Medication Therapy Management pharmacist to continue therapy coverage. The following patient meets the below coverage criteria and can therefore continue GLP-1/GIP agonist therapy for Weight Management:    Adult  BMI >40 with or without comorbidities   OR   BMI >30 + NAFLD*   at time of initiating GLP-1/GIP agonist therapy Approved for 6 months Met Updated Initial Criteria   At least 5% weight loss of baseline body weight  Approved for 12 months      GERD:   Would benefit from using famotidine 10-20 mg as needed rather than omeprazole as needed.     Insomnia   To complete Sleep study.     Supplements   Stable.     PLAN:                            Pharmacist to start Prior Authorization on Wegovy. Once approved, to start Wegovy 0.25 mg subcutaneous once weekly for 4 weeks, then if tolerating increase to 0.5 mg weekly  thereafter.  RX will be sent to [facility] for both 0.25 mg and 0.5 mg doses. The 0.25 mg dose will be filled. The 0.5 mg dose will be put on hold. Call the pharmacy when ready to fill the 0.5 mg box.   2.  Do not need to be on omeprazole.  Can use famotidine (Pepcid) 10 to 20 mg as needed for heartburn.  3.  Complete sleep study as planned.  4.  Follow-up with health , dietitian, Wendy Agosto as planned    Follow-up: Return in about 4 months (around 1/15/2025).    SUBJECTIVE/OBJECTIVE:                          Jacqui Grijalva is a 59 year old female seen for an initial visit. She was referred to me from Carole Guo PA-C.      Reason for visit: [employer] [facility] insurance - starting Wegovy.    Allergies/ADRs: Reviewed in chart  Past Medical History: Reviewed in chart  Tobacco: She reports that she has never smoked. She has never used smokeless tobacco.  Alcohol: decreased alcohol consumption significantly, now 2-4 beverages/week    Medication Adherence/Access: no issues reported    Weight Management  No medications     Follows Carole Guo PA-C for [facility]. Patient was referred 6/2024 for GLP-1 agonist for Weight Management but due to MTM booking out was not able to get in until today. She has made significant dietary/lifestyle changes in the meantime. Patient has no history of pancreatitis. Patient has no personal or family history of medullary thyroid carcinoma or MEN2. She reports she is hungry between meals, working on portion control and modification of diet in the last months.  Working with Meri Greenberg, health .   Nutrition/Eating Habits: working with dietitian, Ann Marie Bermudez. No skipping of meals. She has snacks between meals, trying to make more nourishing choices. Working on being more protein/fiber forward.   Exercise/Activity: significant change - ankle fusion surgery 12/2023. Because of this she no longer has ankle pain. She has  "been doing PT. Walking, biking, golfing effectively.      Initial Consult Weight: 294 lb      Current weight today: 275 lbs 0 oz  Cumulative Weight Loss: -19 lb, -6.5% from baseline    Wt Readings from Last 4 Encounters:   09/19/24 124.7 kg (275 lb)   09/10/24 125.2 kg (276 lb)   09/03/24 125.6 kg (277 lb)   08/07/24 127.9 kg (282 lb)     Estimated body mass index is 38.37 kg/m  as calculated from the following:    Height as of this encounter: 1.803 m (5' 10.98\").    Weight as of this encounter: 124.7 kg (275 lb).    GERD:   Omeprazole 40 mg prescribed once daily, taking 40 mg once every couple of weeks.     She finds with dietary changes she has made over the last 3 months that she hasn't needed omeprazole daily and now taking every couple week.   Patient reports no current symptoms.   Patient feels that current regimen is somewhat effective when taking.     Insomnia   melatonin 5 mg at bedtime    Melatonin helpful to fall asleep but still having issues staying asleep.   She reports that she has been told she snores and mouth breather. Having sleep study.     Supplements  Multivitamin daily   Calcium-vitamin D 1 tablet daily     No reported issues at this time.        Today's Vitals: Ht 1.803 m (5' 10.98\")   Wt 124.7 kg (275 lb)   LMP 08/01/2016 (Approximate)   BMI 38.37 kg/m    ----------------    I spent 30 minutes with this patient today. All changes were made via collaborative practice agreement with Carole Guo PA-C. A copy of the visit note was provided to the patient's provider(s).    A summary of these recommendations was sent via myThings.    Lauren Bloch, PharmD, BCACP   Medication Therapy Management Pharmacist   [facility]    Telemedicine Visit Details  Type of service:  Telephone visit  Start Time:  10:14 AM  End Time:  10:44 AM     Medication Therapy Recommendations  Class 3 severe obesity with serious comorbidity and body mass index (BMI) of 40.0 to " 44.9 in adult, unspecified obesity type (H)    Rationale: Untreated condition - Needs additional medication therapy - Indication   Recommendation: Start Medication - Wegovy 0.25 MG/0.5ML Soaj   Status: Accepted per CPA         GERD (gastroesophageal reflux disease)    Current Medication: omeprazole (PRILOSEC) 40 MG DR capsule (Discontinued)   Rationale: No medical indication at this time - Unnecessary medication therapy - Indication   Recommendation: Change Medication - famotidine 20 MG tablet   Status: Accepted per CPA                Please do not hesitate to contact me if you have any questions/concerns.     Sincerely,     Lauren T. Bloch, [facility]

## 2024-09-19 NOTE — PATIENT INSTRUCTIONS
"Recommendations from today's MTM visit:                                                       Pharmacist to start Prior Authorization on Wegovy. Once approved, to start Wegovy 0.25 mg subcutaneous once weekly for 4 weeks, then if tolerating increase to 0.5 mg weekly thereafter.  RX will be sent to [facility] for both 0.25 mg and 0.5 mg doses. The 0.25 mg dose will be filled. The 0.5 mg dose will be put on hold. Call the pharmacy when ready to fill the 0.5 mg box.   2.  Do not need to be on omeprazole.  Can use famotidine (Pepcid) 10 to 20 mg as needed for heartburn.  3.  Complete sleep study as planned.  4.  Follow-up with health , dietitian, Wendy Agosto as planned    Follow-up: Return in about 4 months (around 1/15/2025).    It was great speaking with you today.  I value your experience and would be very thankful for your time in providing feedback in our clinic survey. In the next few days, you may receive an email or text message from [employer] with a link to a survey related to your  clinical pharmacist.\"     To schedule another MT appointment, please call the clinic directly or you may call the MT scheduling line at [phone] or toll-free at [phone].     My Clinical Pharmacist's contact information:                                                      Please feel free to contact me with any questions or concerns you have.      Lauren Bloch, PharmD  Medication Therapy Management Pharmacist   [facility]      Wegovy Dosing:   Start Wegovy: It is a subcutaneous injection that you inject once weekly and titrate the dose slowly over time. Make sure inject the same time each day of the week, for example Monday evenings.  Each pen is single use.  In each prescription, you will get 4 pens in each box.  You will need a new prescription for each strength of the medication.  Week 1-4: Inject 0.25 mg once weekly  Week 5-8: If tolerating, " increase to 0.5 mg once weekly  Week 9-12: If tolerating, increase to 1 mg once weekly  Week 13-15: If tolerating, increase to 1.7 mg once weekly  Week 16 & on: If tolerating, increase to 2.4 mg once weekly  *If you are having some nausea or other side effects to where you are hesitant to move up to the next dose, stay at the same dose you are on for an additional week to see if side effect(s) improves/resolves. Make sure to take this time to hydrate and ensure you are drinking at least 64 oz water per day.  If you are wanting to stay at the same dose and do not have additional refills on that prescription please reach out to the clinic.    The goal is to get to a dose that is well tolerated and effective for you. You do not have to go up on the dose each month or get to maximum dose if getting an effective response with minimal side effects.     Wegovy Administration Video: Video that was created by the .  https://www.P2i.com/watch?v=[email or web address]    Wegovy Storage and Stability:   Make sure that when you get the prescription that you store the prescription in the refrigerator until it is time to use the Wegovy pen.  Once it is time to use the Wegovy pen, you can keep the pen at room temperature and it is good for up to 28 days at room temperature.     Wegovy Common Side Effects:   Nausea, diarrhea, constipation, headache, tiredness (fatigue), dizziness, stomach upset/pain. Less commonly, Wegovy can cause low blood sugar (symptoms: shaky, dizzy, sweaty, agitation). Please reach out to the care team should you feel like this is occurring. It is important to ensure that you are eating consistent meals and not skipping meals. Ensure you are getting at least 64 oz water daily.       [employer] employees with [email or web address] insurance ([facility]) are restricted to using the [facility] for Saxenda, Wegovy, and Zepbound    [facility]  [facility]  "[place] - [address]  Phone: [phone]    Next steps:  After processing the prescription and saving to your profile, the pharmacy will give you an automated call to inform you that they have your prescription on file. This typically occurs within 1-2 days after the prescription is sent but may take 3-5 business days during high volume times.  You will need to call the pharmacy back to set up the first delivery of every new prescription or new medication dose.   Once you are on a stable dose, you can inquire with the pharmacy about signing up for \"Text to Order through Instant API\", \"Auto Fill\", and/or using the \"Vdol[employer] Rx\" mariela.            "

## 2024-09-19 NOTE — PROGRESS NOTES
"Virtual Visit Details    Type of service:  Telephone Visit   Phone call duration: *** minutes   Originating Location (pt. Location): {patient location:823185::\"Home\"}  {PROVIDER LOCATION On-site should be selected for visits conducted from your clinic location or adjoining [facility] hospital, academic office, or other nearby [facility] building. Off-site should be selected for all other provider locations, including home:634288}  Distant Location (provider location):  {virtual location provider:999632}      "

## 2024-09-19 NOTE — TELEPHONE ENCOUNTER
Hello,    I'm initiating the PA for Wegovy however I don't see that the patient has a BMI above 40 nor 30 with evidence of MASLD. If I'm missing something, please let me know which visit/chart notes that states either one.    Thank You!    Kate Falcon [facility] Pharmacy Liaison  [facility]  [email or web address]  Phone: [phone]  Fax: [phone]    [id number]

## 2024-09-19 NOTE — TELEPHONE ENCOUNTER
PA Initiation    Medication: WEGOVY 0.25 MG/0.5ML SC SOAJ  Insurance Company: [employer] - Phone [phone] Fax [phone]  Pharmacy Filling the Rx:    Filling Pharmacy Phone:    Filling Pharmacy Fax:    Start Date: 9/19/2024    [id number]

## 2024-09-23 NOTE — TELEPHONE ENCOUNTER
Prior Authorization Approval    Medication: WEGOVY 0.25 MG/0.5ML SC SOAJ  Authorization Effective Date: 9/20/2024  Authorization Expiration Date: 12/31/2024  Approved Dose/Quantity: 2ml per 28 days  Reference #: [id number]   Insurance Company: [employer] - Phone [phone] Fax [phone]  Expected CoPay: $    CoPay Card Available: No    Financial Assistance Needed:   Which Pharmacy is filling the prescription:    Pharmacy Notified:   Patient Notified:

## 2024-09-26 ENCOUNTER — OFFICE VISIT (OUTPATIENT)
Dept: DERMATOLOGY | Facility: CLINIC | Age: 59
End: 2024-09-26
Attending: DERMATOLOGY
Payer: COMMERCIAL

## 2024-09-26 DIAGNOSIS — L91.8 INFLAMED ACROCHORDON: ICD-10-CM

## 2024-09-26 DIAGNOSIS — Z85.820 HISTORY OF MELANOMA: ICD-10-CM

## 2024-09-26 DIAGNOSIS — L90.5 SCAR IRRITATION: Primary | ICD-10-CM

## 2024-09-26 PROCEDURE — 11200 RMVL SKIN TAGS UP TO&INC 15: CPT | Performed by: DERMATOLOGY

## 2024-09-26 PROCEDURE — 99214 OFFICE O/P EST MOD 30 MIN: CPT | Mod: 25 | Performed by: DERMATOLOGY

## 2024-09-26 RX ORDER — TRIAMCINOLONE ACETONIDE 1 MG/G
OINTMENT TOPICAL 2 TIMES DAILY
Qty: 200 G | Refills: 5 | OUTPATIENT
Start: 2024-09-26

## 2024-09-26 ASSESSMENT — PAIN SCALES - GENERAL: PAINLEVEL: NO PAIN (0)

## 2024-09-26 NOTE — Clinical Note
9/26/2024       RE: Jacqui Grijalva  [address]  [place] [address]     Dear Colleague,    Thank you for referring your patient, Jacqui Grijalva, to the [facility] [place] at [facility]. Please see a copy of my visit note below.    [facility] Dermatology Note  Encounter Date: Sep 26, 2024  Office Visit     Dermatology Problem List:  1. Melanoma    - Left lower leg (Excised Fall 2023 at [facility])  2. SCC   - R lower leg (Excised Fall 2023 at [facility])  3. BCC   - L upper thigh (biopsied Fall 2023 at [facility], excised 05/06/24)  - L cheek (Excised Fall 2023 at [facility])  - R forearm (Excised ~2010)  - Upper chest (Excised ~2010)  4. Atypical Nevus   - L inferior axilla (Excised Fall 2023 at [facility])   5. Lichenoid Keratosis   - R chest, biopsied 02/29/24  6. Porokeratosis  - Reassured benign  7. Likely hidrocystoma   - L lower eyelid, reassured benign   8. Scar pain  - Triamcinolone 0.1% ointment  9. Inflamed acrochordon  10. EIC  ____________________________________________    Assessment & Plan:    # Skin colored papule, R medial lower eyelid  Favor hidrocystoma, pictures taken in chart today. Will consider biopsy at 6 month follow up if increase in size or other changes.     # Inflamed acrochordon  At posterior bra line, inflamed from clothing friction.  - Cryotherapy per note below    # EIC  On R upper arm, patient reports that this is bothersome and would like it removed  - Incision and drainage per note below    # Scar pain  At site of prior melanoma excision patient reports scar tenderness, tightness, intermittent dryness. Aquaphor has not improved symptoms.   - Triamcinolone 0.1% ointment    # History of Melanoma, L lower leg  Scar healed with pigmented streaking, likely reactive pigmentation. Photo taken for chart  today. Full body skin check in 6 months.   - Sun protection: Counseled SPF30+ sunscreen, UPF clothing, sun avoidance, tanning bed avoidance.  - Continue regular skin checks every six months.      # Hx multiple NMSC  Most recent treated on L upper thigh with excision on 05/06/24 by Dr. Moreno. Site appears has healed well, photo taken today, no concern for recurrence. Reviewed sun protection and signs to watch for (painful, non healing lesions).  - Continue regular skin checks every 6 months.    Procedures Performed:   - Cryotherapy procedure note, location(s): back. After verbal consent and discussion of risks and benefits including, but not limited to, dyspigmentation/scar, blister, and pain, 1 lesion(s) was(were) treated with 1-2 mm freeze border for 1-2 cycles with liquid nitrogen. Post cryotherapy instructions were provided.    - Incision and Drainage (I&D). After discussion of the risks including bleeding, infection, scar, non diagnosis and skin discoloration, verbal and/or written consent was obtained. The area was prepped with alcohol. A 15 blade was used to incise the lesion and the cystic component was extracted. A dressing was placed. The patient tolerated the procedure well and left the dermatology clinic in good condition.     Procedure done by faculty    Follow-up: 6 month(s) in-person, or earlier for new or changing lesions    Staff and Medical Student:     MS4    ***  ____________________________________________    CC: Skin Check (Jacqui is here today for a skin check )    HPI:  Ms. Jacqui Grijalva is a(n) 59 year old female who presents today as a return patient for FBSE. She last saw Dr. Valera on 02/29 at which time she was noted to have a neoplasm of uncertain behavior on the chest, it was biopsied and results showed lichenoind keratosis. Biopsy site has healed well. She has not had any sites that she noticed were changing, painful, bleeding, nonhealing.     She reports that the scar of her prior  melanoma excision feels very tight and sometimes painful/tender. It is additionally very dry. She has been using Aquaphor or Vaseline consistently without significant improvement.     She additionally notes that she has two spots which are bothersome for her: a skin tag on the bra line on her back, as well as a small nodule on the R upper arm.     Family history of skin cancers: father had spots removed but per patient, he was a poor historian so type is unknown. Wears sunscreen regularly. History of tanning bed exposure (~100).     Patient is otherwise feeling well, without additional skin concerns.    Labs:  Dermatopathology report from 2/29/24  reviewed.  Right mid chest:  - Benign lichenoid keratosis    Physical Exam:  Vitals: LMP 08/01/2016 (Approximate)   SKIN: Full skin, which includes the head/face, both arms, chest, back, abdomen,both legs, genitalia and/or groin buttocks, digits and/or nails, was examined.  - There are well-healing scars from previous excisions on the right lower leg, right arm, left cheek, right chest.   - Right lower leg well healed scar with pinpoint capillaries and pigment streaking, photo in chart today.  - There are dome shaped bright red papules on the chest.   - Multiple regular brown pigmented macules and papules are identified on the trunk and extremities.   - Scattered brown macules on sun exposed areas.  - There are waxy stuck on tan to brown papule on the trunk.   - Waxy stuck on papule with annular scale on the left hand.  - Soft skin colored papule beneath medial right lower eyelid.  - No other lesions of concern on areas examined.     Medications:  Current Outpatient Medications   Medication Sig Dispense Refill    acetaminophen (TYLENOL) 500 MG tablet Take 500-1,000 mg by mouth every 6 hours as needed for mild pain      calcium carbonate-vitamin D (CALTRATE) 600-10 MG-MCG per tablet Take 1 tablet by mouth daily.      ibuprofen (ADVIL/MOTRIN) 200 MG tablet Take 2-3 tablets  by mouth every 6 hours as needed.      melatonin 5 MG tablet Take 5 mg by mouth nightly as needed for sleep      multivitamin w/minerals (THERA-VIT-M) tablet Take 1 tablet by mouth daily      Semaglutide-Weight Management (WEGOVY) 0.25 MG/0.5ML pen Inject 0.25 mg subcutaneously once a week. 2 mL 0    Semaglutide-Weight Management (WEGOVY) 0.5 MG/0.5ML pen Inject 0.5 mg subcutaneously once a week. After completing 4 weeks of 0.25 mg weekly 2 mL 0    valACYclovir (VALTREX) 1000 mg tablet Take 2 tablets (2,000 mg) by mouth 2 times daily 4 tablet 4     No current facility-administered medications for this visit.      Past Medical History:   Patient Active Problem List   Diagnosis    Tear film insufficiency    Chronic allergic conjunctivitis - Both Eyes    Post-traumatic osteoarthritis of right knee    Ankle pain, left    Adjustment disorder with anxious mood    Melanoma of skin (H)    Neoplasm of uncertain behavior of skin of chest    Basal cell carcinoma (BCC) of left thigh    GERD (gastroesophageal reflux disease)    Class 3 severe obesity with serious comorbidity and body mass index (BMI) of 40.0 to 44.9 in adult, unspecified obesity type (H)     Past Medical History:   Diagnosis Date    Pain in joint, ankle and foot, left     traumatic injury in childhood    Post-traumatic osteoarthritis of right knee 07/11/2016     CC Fabrice Valera MD  [address] [place] [phone] on close of this encounter.        Again, thank you for allowing me to participate in the care of your patient.      Sincerely,    Fabrice Valera MD

## 2024-09-26 NOTE — PATIENT INSTRUCTIONS
Thank you for coming in today!    Please follow up in 6 months for another skin check, or sooner if you notice any spots that are changing, bleeding, painful, or otherwise concerning for you.    For the scar we prescribed a steroid ointment called triamcinolone. You can put this on once a day to help the scar.

## 2024-09-26 NOTE — NURSING NOTE
Dermatology Rooming Note    Jacqui Grijalva's goals for this visit include:   Chief Complaint   Patient presents with    Skin Check     Jacqui is here today for a skin check      SHADY Kidd - Dermatology

## 2024-09-27 DIAGNOSIS — Z86.39 HISTORY OF MORBID OBESITY: ICD-10-CM

## 2024-09-27 DIAGNOSIS — K76.0 METABOLIC DYSFUNCTION-ASSOCIATED STEATOTIC LIVER DISEASE (MASLD): Primary | ICD-10-CM

## 2024-09-27 PROCEDURE — 99207 PR NO CHARGE LOS: CPT

## 2024-09-27 PROCEDURE — 91200 LIVER ELASTOGRAPHY: CPT | Mod: 26

## 2024-09-27 NOTE — PROGRESS NOTES
[facility] Dermatology Note  Encounter Date: Sep 26, 2024  Office Visit     Dermatology Problem List:  1. Melanoma    - Left lower leg (Excised Fall 2023 at [facility])  2. SCC   - R lower leg (Excised Fall 2023 at [facility])  3. BCC   - L upper thigh (biopsied Fall 2023 at [facility], excised 05/06/24)  - L cheek (Excised Fall 2023 at [facility])  - R forearm (Excised ~2010)  - Upper chest (Excised ~2010)  4. Atypical Nevus   - L inferior axilla (Excised Fall 2023 at [facility])   5. Lichenoid Keratosis   - R chest, biopsied 02/29/24  6. Porokeratosis  - Reassured benign  7. Likely hidrocystoma   - L lower eyelid, reassured benign   8. Scar pain  - Triamcinolone 0.1% ointment  9. Inflamed acrochordon  10. EIC  ____________________________________________    Assessment & Plan:    # Skin colored papule, R medial lower eyelid  Favor hidrocystoma, pictures taken in chart today. Will consider biopsy at 6 month follow up if increase in size or other changes.     # Inflamed acrochordon  At posterior bra line, inflamed from clothing friction.  - Cryotherapy per note below    # EIC  On R upper arm, patient reports that this is bothersome and would like it removed  - Incision and drainage per note below    # Scar pain  At site of prior melanoma excision patient reports scar tenderness, tightness, intermittent dryness. Aquaphor has not improved symptoms.   - Triamcinolone 0.1% ointment    # History of Melanoma, L lower leg  Scar healed with pigmented streaking, likely reactive pigmentation. Photo taken for chart today. Full body skin check in 6 months.   - Sun protection: Counseled SPF30+ sunscreen, UPF clothing, sun avoidance, tanning bed avoidance.  - Continue regular skin checks every six months.      # Hx multiple NMSC  Most recent treated on L upper thigh with excision on 05/06/24 by Dr. Moreno. Site appears has healed well, photo taken  today, no concern for recurrence. Reviewed sun protection and signs to watch for (painful, non healing lesions).  - Continue regular skin checks every 6 months.    Procedures Performed:   - Cryotherapy procedure note, location(s): back. After verbal consent and discussion of risks and benefits including, but not limited to, dyspigmentation/scar, blister, and pain, 1 lesion(s) was(were) treated with 1-2 mm freeze border for 1-2 cycles with liquid nitrogen. Post cryotherapy instructions were provided.    - Incision and Drainage (I&D). After discussion of the risks including bleeding, infection, scar, non diagnosis and skin discoloration, verbal and/or written consent was obtained. The area was prepped with alcohol. A 15 blade was used to incise the lesion and the cystic component was extracted. A dressing was placed. The patient tolerated the procedure well and left the dermatology clinic in good condition.     Procedure done by faculty    Follow-up: 6 month(s) in-person, or earlier for new or changing lesions    Staff and Medical Student:     MS4    ***  ____________________________________________    CC: Skin Check (Jacqui is here today for a skin check )    HPI:  Ms. Jacqui Grijalva is a(n) 59 year old female who presents today as a return patient for FBSE. She last saw Dr. Valera on 02/29 at which time she was noted to have a neoplasm of uncertain behavior on the chest, it was biopsied and results showed lichenoind keratosis. Biopsy site has healed well. She has not had any sites that she noticed were changing, painful, bleeding, nonhealing.     She reports that the scar of her prior melanoma excision feels very tight and sometimes painful/tender. It is additionally very dry. She has been using Aquaphor or Vaseline consistently without significant improvement.     She additionally notes that she has two spots which are bothersome for her: a skin tag on the bra line on her back, as well as a small nodule on the R  upper arm.     Family history of skin cancers: father had spots removed but per patient, he was a poor historian so type is unknown. Wears sunscreen regularly. History of tanning bed exposure (~100).     Patient is otherwise feeling well, without additional skin concerns.    Labs:  Dermatopathology report from 2/29/24  reviewed.  Right mid chest:  - Benign lichenoid keratosis    Physical Exam:  Vitals: LMP 08/01/2016 (Approximate)   SKIN: Full skin, which includes the head/face, both arms, chest, back, abdomen,both legs, genitalia and/or groin buttocks, digits and/or nails, was examined.  - There are well-healing scars from previous excisions on the right lower leg, right arm, left cheek, right chest.   - Right lower leg well healed scar with pinpoint capillaries and pigment streaking, photo in chart today.  - There are dome shaped bright red papules on the chest.   - Multiple regular brown pigmented macules and papules are identified on the trunk and extremities.   - Scattered brown macules on sun exposed areas.  - There are waxy stuck on tan to brown papule on the trunk.   - Waxy stuck on papule with annular scale on the left hand.  - Soft skin colored papule beneath medial right lower eyelid.  - No other lesions of concern on areas examined.     Medications:  Current Outpatient Medications   Medication Sig Dispense Refill    acetaminophen (TYLENOL) 500 MG tablet Take 500-1,000 mg by mouth every 6 hours as needed for mild pain      calcium carbonate-vitamin D (CALTRATE) 600-10 MG-MCG per tablet Take 1 tablet by mouth daily.      ibuprofen (ADVIL/MOTRIN) 200 MG tablet Take 2-3 tablets by mouth every 6 hours as needed.      melatonin 5 MG tablet Take 5 mg by mouth nightly as needed for sleep      multivitamin w/minerals (THERA-VIT-M) tablet Take 1 tablet by mouth daily      Semaglutide-Weight Management (WEGOVY) 0.25 MG/0.5ML pen Inject 0.25 mg subcutaneously once a week. 2 mL 0    Semaglutide-Weight Management  (WEGOVY) 0.5 MG/0.5ML pen Inject 0.5 mg subcutaneously once a week. After completing 4 weeks of 0.25 mg weekly 2 mL 0    valACYclovir (VALTREX) 1000 mg tablet Take 2 tablets (2,000 mg) by mouth 2 times daily 4 tablet 4     No current facility-administered medications for this visit.      Past Medical History:   Patient Active Problem List   Diagnosis    Tear film insufficiency    Chronic allergic conjunctivitis - Both Eyes    Post-traumatic osteoarthritis of right knee    Ankle pain, left    Adjustment disorder with anxious mood    Melanoma of skin (H)    Neoplasm of uncertain behavior of skin of chest    Basal cell carcinoma (BCC) of left thigh    GERD (gastroesophageal reflux disease)    Class 3 severe obesity with serious comorbidity and body mass index (BMI) of 40.0 to 44.9 in adult, unspecified obesity type (H)     Past Medical History:   Diagnosis Date    Pain in joint, ankle and foot, left     traumatic injury in childhood    Post-traumatic osteoarthritis of right knee 07/11/2016     CC Fabrice Valera MD  [address] [address] on close of this encounter.

## 2024-09-27 NOTE — PROGRESS NOTES
EXAMINATION:    Liver FibroScan    DATE OF EXAM:  9/27/24    INDICATION:    Liver fibrosis assessment      A series of at least 10 Vibration Controlled Transient Elastography (VCTETM) measurements was performed by  placing the probe XL (M, XL) over the center of the liver parenchyma and mechanically inducing a 50 Hertz  shear wave.    Each resulting VCTETM measurement was analyzed to determine shear wave propagation speed and  calculate the equivalent liver stiffness.    All measurements were reviewed by the  and physician fortechnical accuracy. Data variability across the acquired measurements was quantified with IQR/Median Percentage.    FINDINGS:    The median liver stiffness was 6.5 kPa with IQR/Median percentage of 23 %.    The measure CAP ultrasound attenuation rate value was 276 dB/m.

## 2024-09-29 ENCOUNTER — NURSE TRIAGE (OUTPATIENT)
Dept: NURSING | Facility: CLINIC | Age: 59
End: 2024-09-29

## 2024-09-29 DIAGNOSIS — U07.1 INFECTION DUE TO 2019 NOVEL CORONAVIRUS: Primary | ICD-10-CM

## 2024-09-29 NOTE — TELEPHONE ENCOUNTER
COVID Positive/Requesting COVID treatment    Patient is positive for COVID and requesting treatment options.    Date of positive COVID test (PCR or at home)? 9/29/24  Current COVID symptoms: fever or chills, cough, muscle or body aches, headache, sore throat, and congestion or runny nose  Date COVID symptoms began: 9/27/24    Message should be routed to clinic RN pool. Best phone number to use for call back: [phone]    Reason for Disposition   [1] HIGH RISK patient (e.g., weak immune system, age > 64 years, obesity with BMI 30 or higher, pregnant, chronic lung disease) AND [2] COVID symptoms (e.g., cough, fever)  (Exceptions: Already seen by PCP and no new or worsening symptoms.)    Additional Information   Negative: SEVERE difficulty breathing (e.g., struggling for each breath, speaks in single words)   Negative: Difficult to awaken or acting confused (e.g., disoriented, slurred speech)   Negative: Bluish (or gray) lips or face now   Negative: Shock suspected (e.g., cold/pale/clammy skin, too weak to stand, low BP, rapid pulse)   Negative: Sounds like a life-threatening emergency to the triager   Negative: [1] Diagnosed or suspected COVID-19 AND [2] symptoms lasting 3 or more weeks   Negative: [1] COVID-19 exposure AND [2] no symptoms   Negative: COVID-19 vaccine reaction suspected (e.g., fever, headache, muscle aches) occurring 1 to 3 days after getting vaccine   Negative: COVID-19 vaccine, questions about   Negative: [1] Exposure to someone known to have influenza (flu test positive) AND [2] flu-like symptoms (e.g., cough, runny nose, sore throat, SOB; with or without fever)   Negative: [1] Possible COVID-19 symptoms AND [2] triager concerned about severity of symptoms or other causes   Negative: COVID-19 and breastfeeding, questions about   Negative: SEVERE or constant chest pain or pressure  (Exception: Mild central chest pain, present only when coughing.)   Negative: MODERATE difficulty breathing (e.g.,  speaks in phrases, SOB even at rest, pulse 100-120)   Negative: [1] Headache AND [2] stiff neck (can't touch chin to chest)   Negative: Oxygen level (e.g., pulse oximetry) 90% or lower     Unknown   Negative: Chest pain or pressure  (Exception: MILD central chest pain, present only when coughing.)   Negative: [1] Drinking very little AND [2] dehydration suspected (e.g., no urine > 12 hours, very dry mouth, very lightheaded)   Negative: Patient sounds very sick or weak to the triager   Negative: MILD difficulty breathing (e.g., minimal/no SOB at rest, SOB with walking, pulse <100)   Negative: Fever > 103 F (39.4 C)   Negative: [1] Fever > 101 F (38.3 C) AND [2] age > 60 years   Negative: [1] Fever > 100 F (37.8 C) AND [2] bedridden (e.g., CVA, chronic illness, recovering from surgery)   Negative: Oxygen level (e.g., pulse oximetry) 91 to 94%     Unknown    Protocols used: COVID-19 - Diagnosed or [id number]    Mesha Stone RN  [facility] Nurse Advisor   9/29/2024  6:31 PM

## 2024-09-30 NOTE — TELEPHONE ENCOUNTER
RN COVID TREATMENT VISIT  09/30/24      The patient has been triaged and does not require a higher level of care.    Jacqui Grijalva  59 year old  Current weight? 275lbs    Has the patient been seen by a primary care provider at an [facility] or [facility] within the past two years? Yes.   Have you been in close proximity to/do you have a known exposure to a person with a confirmed case of influenza? No.     General treatment eligibility:  Date of positive COVID test (PCR or at home)?  9/29/24    Are you or have you been hospitalized for this COVID-19 infection? No.   Have you received monoclonal antibodies or antiviral treatment for COVID-19 since this positive test? No.   Do you have any of the following conditions that place you at risk of being very sick from COVID-19?   - Age 50 years or older  - Cancer/active malignancy including patients with cancer who are currently receiving chemotherapy or radiation, metastatic cancer, advance cancer and are receiving palliative care  - Mental health disorders including mood disorders, depression, schizophrenia spectrum disorders   - Overweight or Obesity (BMI >85th percentile or BMI 25 or higher)  - Patient reports sedentary lifestyle (physically inactive)  Yes, patient has at least one high risk condition as noted above.     Current COVID symptoms:   - fever or chills  - cough  - muscle or body aches  - headache  - sore throat  - congestion or runny nose  Yes. Patient has at least one symptom as selected.     How many days since symptoms started? 5 days or less. Established patient, 12 years or older weighing at least 88.2 lbs, who has symptoms that started in the past 5 days, has not been hospitalized nor received treatment already, and is at risk for being very sick from COVID-19.     Treatment eligibility by RN:  Are you currently pregnant or nursing? No  Do you have a clinically significant hypersensitivity to nirmatrelvir or ritonavir,  or toxic epidermal necrolysis (TEN) or Vogt-Cody Syndrome? No  Do you have a history of hepatitis, any hepatic impairment on the Problem List (such as Child-Morse Class C, cirrhosis, fatty liver disease, alcoholic liver disease), or was the last liver lab (hepatic panel, ALT, AST, ALK Phos, bilirubin) elevated in the past 6 months? No  Do you have any history of severe renal impairment (eGFR < 30mL/min)? No    Is patient eligible to continue? Yes, patient meets all eligibility requirements for the RN COVID treatment (as denoted by all no responses above).     Current Outpatient Medications   Medication Sig Dispense Refill    acetaminophen (TYLENOL) 500 MG tablet Take 500-1,000 mg by mouth every 6 hours as needed for mild pain      calcium carbonate-vitamin D (CALTRATE) 600-10 MG-MCG per tablet Take 1 tablet by mouth daily.      ibuprofen (ADVIL/MOTRIN) 200 MG tablet Take 2-3 tablets by mouth every 6 hours as needed.      melatonin 5 MG tablet Take 5 mg by mouth nightly as needed for sleep      multivitamin w/minerals (THERA-VIT-M) tablet Take 1 tablet by mouth daily      Semaglutide-Weight Management (WEGOVY) 0.25 MG/0.5ML pen Inject 0.25 mg subcutaneously once a week. 2 mL 0    Semaglutide-Weight Management (WEGOVY) 0.5 MG/0.5ML pen Inject 0.5 mg subcutaneously once a week. After completing 4 weeks of 0.25 mg weekly 2 mL 0    valACYclovir (VALTREX) 1000 mg tablet Take 2 tablets (2,000 mg) by mouth 2 times daily 4 tablet 4       Medications from List 1 of the standing order (on medications that exclude the use of Paxlovid) that patient is taking: NONE. Is patient taking Chignik's Wort? No  Is patient taking Lilli's Wort or any meds from List 1? No.   Medications from List 2 of the standing order (on meds that provider needs to adjust) that patient is taking: NONE. Is patient on any of the meds from List 2? No.   Medications from List 3 of standing order (on meds that a RN needs to adjust) that patient is  taking: NONE. Is patient on any meds from List 3? No.     Paxlovid has an approximate 90% reduction in hospitalization. Paxlovid can possibly cause altered sense of taste, diarrhea (loose, watery stools), high blood pressure, muscle aches.     Would patient like a Paxlovid prescription?   Yes.   Lab Results   Component Value Date    GFRESTIMATED 75 11/07/2023       Was last eGFR reduced? No, eGFR 60 or greater/ No Result on record. Patient can receive the normal renal function dose. Paxlovid Rx sent to [facility] [phone]  [address] [place] [address]    Hours:  Monday - Friday: 8am - 6pm  Saturday - Sunday: 8am - 4pm    [facility] Delivery: Patient to call [phone] to set up  in the cul de sac of the professional building    Temporary change to home medications: None    All medication adjustments (holds, etc) were discussed with the patient and patient was asked to repeat back (teachback) their med adjustment.  Did patient understand med adjustment? No medication adjustments needed.         Reviewed the following instructions with the patient:    Paxlovid (nimatrelvir and ritonavir)    How it works  Two medicines (nirmatrelvir and ritonavir) are taken together. They stop the virus from growing. Less amount of virus is easier for your body to fight.    How to take  Medicine comes in a daily container with both medicine tablets. Take by mouth twice daily (once in the morning, once at night) for 5 days.  The number of tablets to take varies by patient.  Don't chew or break capsules. Swallow whole.    When to take  Take as soon as possible after positive COVID-19 test result, and within 5 days of your first symptoms.    Possible side effects  Can cause altered sense of taste, diarrhea (loose, watery stools), high blood pressure, muscle aches.    SAW ABDI RN

## 2024-10-07 ASSESSMENT — SLEEP AND FATIGUE QUESTIONNAIRES
HOW LIKELY ARE YOU TO NOD OFF OR FALL ASLEEP IN A CAR, WHILE STOPPED FOR A FEW MINUTES IN TRAFFIC: WOULD NEVER DOZE
HOW LIKELY ARE YOU TO NOD OFF OR FALL ASLEEP WHEN YOU ARE A PASSENGER IN A CAR FOR AN HOUR WITHOUT A BREAK: SLIGHT CHANCE OF DOZING
HOW LIKELY ARE YOU TO NOD OFF OR FALL ASLEEP WHILE SITTING AND READING: WOULD NEVER DOZE
HOW LIKELY ARE YOU TO NOD OFF OR FALL ASLEEP WHILE SITTING AND TALKING TO SOMEONE: WOULD NEVER DOZE
HOW LIKELY ARE YOU TO NOD OFF OR FALL ASLEEP WHILE LYING DOWN TO REST IN THE AFTERNOON WHEN CIRCUMSTANCES PERMIT: SLIGHT CHANCE OF DOZING
HOW LIKELY ARE YOU TO NOD OFF OR FALL ASLEEP WHILE SITTING INACTIVE IN A PUBLIC PLACE: WOULD NEVER DOZE
HOW LIKELY ARE YOU TO NOD OFF OR FALL ASLEEP WHILE WATCHING TV: WOULD NEVER DOZE
HOW LIKELY ARE YOU TO NOD OFF OR FALL ASLEEP WHILE SITTING QUIETLY AFTER LUNCH WITHOUT ALCOHOL: WOULD NEVER DOZE

## 2024-10-08 ENCOUNTER — OFFICE VISIT (OUTPATIENT)
Dept: SLEEP MEDICINE | Facility: CLINIC | Age: 59
End: 2024-10-08
Attending: INTERNAL MEDICINE
Payer: COMMERCIAL

## 2024-10-08 DIAGNOSIS — R06.83 SNORING: ICD-10-CM

## 2024-10-08 DIAGNOSIS — R06.81 WITNESSED EPISODE OF APNEA: ICD-10-CM

## 2024-10-08 PROCEDURE — 95800 SLP STDY UNATTENDED: CPT

## 2024-10-08 NOTE — PROGRESS NOTES
Pt is completing a home sleep test. Pt was instructed on how to put on the Noxturnal T3 device and associated equipment before going to bed and given the opportunity to practice putting it on before leaving the sleep center. Pt was reminded to bring the home sleep test kit back to the center tomorrow, at the scheduled time for download and reporting. Patient was instructed to complete study using the following treatment?  None  Neck circumference: 42 CM / 16.5 inches.  Device number: 03  Lucinda Cartwright , Home Sleep Studies Specialist  [place]  / [facility]

## 2024-10-09 ENCOUNTER — VIRTUAL VISIT (OUTPATIENT)
Dept: ENDOCRINOLOGY | Facility: CLINIC | Age: 59
End: 2024-10-09

## 2024-10-09 ENCOUNTER — DOCUMENTATION ONLY (OUTPATIENT)
Dept: SLEEP MEDICINE | Facility: CLINIC | Age: 59
End: 2024-10-09
Payer: COMMERCIAL

## 2024-10-09 DIAGNOSIS — E66.3 OVERWEIGHT: Primary | ICD-10-CM

## 2024-10-09 PROCEDURE — 99207 PR NO CHARGE LOS: CPT | Mod: 95

## 2024-10-09 NOTE — PROGRESS NOTES
HST POST-STUDY QUESTIONNAIRE    What time did you go to bed?  10  How long do you think it took to fall asleep?  30 min  What time did you wake up to start the day?  6  Did you get up during the night at all?  yes  If you woke up, do you remember approximately what time(s)? Countless times, at least 5  Did you have any difficulty with the equipment?  No  Did you us any type of treatment with this study?  None  Was the head of the bed elevated? No  Did you sleep in a recliner?  No  Did you stop using CPAP at least 3 days before this test?  NA  Any other information you'd like us to know? no

## 2024-10-09 NOTE — LETTER
10/9/2024       RE: Jacqui Grijalva  [address]  [place] [address]     Dear Colleague,    Thank you for referring your patient, Jacqui Grijalva, to the [facility] [place] at [facility]. Please see a copy of my visit note below.    24 wk  [facility] Pt garcía  Video visit  Visit #6      CW: 270 (last wght 275)  Started wegovy and going well  Went to clinic for body fat percentage and is at 46 percent and down 4 percent  Waiting for followup apt to find result of sleep study apt  Enjoying activity outside; golf and walking    GOALS  Continue with my 210 min of activity per week; golfing and walking  Try setting a timer for watching videos at night and falling asleep in savasana (with sleep meditation tape)    NEXT HEALTH  VISIT: NOV 1 at 9 am, video visit    Meri Greenberg  Critical access hospital[facility], National Board Certified Health &   Lead Health   [facility]  [email or web address]   To schedule: [phone]   Gender pronouns: she/her         Again, thank you for allowing me to participate in the care of your patient.      Sincerely,    Meri Greenberg

## 2024-10-09 NOTE — PROGRESS NOTES
This HSAT was performed using a Noxturnal T3 device which recorded snore, sound, movement activity, body position, nasal pressure, oronasal thermal airflow, pulse, oximetry and both chest and abdominal respiratory effort. HSAT data was restricted to the time patient states they were in bed.     HSAT was scored using 1B 4% hypopnea rule.     HST AHI (Non-PAT): 23  Snoring was reported as moderate and loud.  Time with SpO2 below 89% was 26.1 minutes.   Overall signal quality was good     Pt will follow up with sleep provider to determine appropriate therapy.

## 2024-10-09 NOTE — PROGRESS NOTES
24 wk  [facility]SC Pt garcía  Video visit  Visit #6      CW: 270 (last wght 275)  Started wegovy and going well  Went to clinic for body fat percentage and is at 46 percent and down 4 percent  Waiting for followup apt to find result of sleep study apt  Enjoying activity outside; golf and walking    GOALS  Continue with my 210 min of activity per week; golfing and walking  Try setting a timer for watching videos at night and falling asleep in savasana (with sleep meditation tape)    NEXT HEALTH  VISIT: NOV 1 at 9 am, video visit    Meri Greenberg  [facility], National Board Certified Health &   Lead Health   [facility]  [email or web address]  [email or web address]   To schedule: [phone]   Gender pronouns: she/her

## 2024-10-14 ENCOUNTER — THERAPY VISIT (OUTPATIENT)
Dept: PHYSICAL THERAPY | Facility: CLINIC | Age: 59
End: 2024-10-14
Payer: COMMERCIAL

## 2024-10-14 DIAGNOSIS — M17.31 POST-TRAUMATIC OSTEOARTHRITIS OF RIGHT KNEE: Primary | ICD-10-CM

## 2024-10-14 PROCEDURE — 97112 NEUROMUSCULAR REEDUCATION: CPT | Mod: GP | Performed by: PHYSICAL THERAPIST

## 2024-10-14 PROCEDURE — 97110 THERAPEUTIC EXERCISES: CPT | Mod: GP | Performed by: PHYSICAL THERAPIST

## 2024-10-14 NOTE — PROCEDURES
Home Sleep Study Interpretation    Patient: Jacqui Grijalva  MRN: [id number]  YOB: 1965  Study Date: 10/8/2024  PCP/Referring Provider: Bijal Browne;  Ordering Provider: Krysten Velarde MD    Indications for Home Study: Jacqui is a 59 Female with a history of class III obesity, GERD who presents with symptoms suggestive of obstructive sleep apnea      Weight: 275.0 lbs  BMI: 39.5   [place]:   STOP BAN/8      Data: A full night home sleep study was performed recording the standard physiologic parameters including body position, movement, sound, nasal pressure, thermal oral airflow, chest and abdominal movements with respiratory inductance plethysmography, and oxygen saturation by pulse oximetry. Pulse rate was estimated by oximetry recording. This study was considered adequate based on > 4 hours of quality oximetry and respiratory recording. As specified by the AASM Manual for the Scoring of Sleep and Associated events, version 2.3, Rule VIII.D 1B, 4% oxygen desaturation scoring for hypopneas is used as a standard of care on all home sleep apnea testing.    Analysis Time: 435.2 minutes    Respiration:  Sleep Associated Hypoxemia: Sustained hypoxemia was present. Baseline oxygen saturation was 92. Time with saturation less than 89% was 26.1 minutes. Time with saturation less than 90% was 66.3 minutes. The lowest oxygen saturation was 78.0%.  Snoring: Snoring was present 13% of the time with an average level of 73 dB. Duration of time snoring above 70 dB was 56.1 minutes.    Respiratory events: The home study revealed a presence of 23 obstructive apneas and 4 mixed and central apneas. There were 140 hypopneas resulting in a combined apnea/hypopnea index [AHI] of 23 events per hour with  53 per hour supine, 0  per hour prone, 0 per hour upright, 12  per hour left side, and 8 per hour right side.    Position: Percent of time spent: Supine 28%, prone 0%, upright 0%, on left 56%, on right  16%.    Heart Rate: By Pulse Oximetry normal rate was noted.    Assessment:  moderate obstructive sleep apnea.  Sleep associated hypoxemia was present.    Recommendations:  Consider auto-CPAP at 5-15 cmH2O, oral appliance therapy, or positional therapy  Suggest optimizing sleep hygiene and avoiding sleep deprivation  Weight management    Diagnosis Code(s): Obstructive Sleep Apnea G47.33, Hypoxemia G47.36, Snoring R06.83    Electronically signed by: Syd Bashir MD October 14, 2024  Diplomate, American Board of Internal Medicine, Sleep Medicine

## 2024-10-24 ENCOUNTER — DOCUMENTATION ONLY (OUTPATIENT)
Dept: FAMILY MEDICINE | Facility: CLINIC | Age: 59
End: 2024-10-24
Payer: COMMERCIAL

## 2024-10-24 DIAGNOSIS — Z28.39 INCOMPLETE IMMUNIZATION SERIES: Primary | ICD-10-CM

## 2024-10-24 NOTE — PROGRESS NOTES
Jacqui Grijalva has an upcoming lab appointment:    Future Appointments   Date Time Provider Department Center   10/30/2024  2:15 PM [facility] OX   10/30/2024  2:30 PM OX IM MA/LPN OXIM OX   10/31/2024 12:00 PM Ann Marie Bermudez, ROSEMARIE [facility]   11/1/2024  9:00 AM Yari Emri [facility]   11/1/2024 12:30 PM Meri Greenberg [facility]   11/6/2024  7:40 AM Romel Segura, PT [facility]   11/11/2024  2:00 PM Krysten Velarde MD [facility]   11/11/2024  3:30 PM Bijal Bronwe, KAYLIE CNP [facility]   11/25/2024  7:40 AM Romel Segura, PT [facility]   11/25/2024  3:00 PM Wendy Gonzalez, CINTIA [facility]   12/6/2024 12:30 PM Yari Meri [facility]   12/18/2024  7:40 AM Romel Segura, PT [facility]   1/13/2025  9:30 AM Bloch, Lauren Turner, [facility]     Patient is scheduled for the following lab(s):   Scheduling Notes: 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 Please review and place either future orders or HMPO (Review of Health Maintenance Protocol Orders), as appropriate.    Health Maintenance Due   Topic    ANNUAL REVIEW OF HM ORDERS      Maurizio Levi

## 2024-10-28 NOTE — PROGRESS NOTES
I Placed those orders for her, I also added the Hep B testing to make sure she has immunity for Hep B as well. I will send her a message with this information.

## 2024-10-29 NOTE — PROGRESS NOTES
"Video-Visit Details    Type of service:  Video Visit    Video Start Time: 12:05 PM   Video End Time: 12:29 PM    Originating Location (pt. Location): Home    Distant Location (provider location):  Offsite (providers home) [facility] [place]     Platform used for Video Visit: [employer]      Weight Management Nutrition Consultation    Jacqui Grijalva is a 59 year old female presents today for return weight management nutrition consultation.  Patient referred by Wendy Gonzalez PA-C on May 30, 2024.    Patient with Co-morbidities of obesity includin/29/2024     3:41 PM   --   I have the following health issues associated with obesity Sleep Apnea    GERD (Reflux)    Stress Incontinence    Osteoarthritis (joint disease)   I have the following symptoms associated with obesity Knee Pain    Fatigue     MAFLD     Anthropometrics:  Initial:  Estimated body mass index is 41 kg/m  as calculated from the following:    Height as of 24: 1.803 m (5' 11\").    Weight as of 24: 133.4 kg (294 lb).      Tanita Scale:  Date: 24  BW: 272 lbs - down 22 lbs from May body comp  Fat%: 46.9% (desirable range: 23--33.9%) - down 4%   Muscle%: 50.4%  Muscle Mass: 137.2 lbs - Up 1 lb   Visceral Fat Ratin (optimal less than 13) - Down two points  BMR: 2038 kcal    Current:  Estimated body mass index is 37.4 kg/m  as calculated from the following:    Height as of this encounter: 1.803 m (5' 10.98\").    Weight as of this encounter: 121.6 kg (268 lb). (-8 lbs from last RD visit 9/10, -26 lbs from initial)       Medications for Weight Loss:  Wegovy    NUTRITION HISTORY  Food allergies: None  Food intolerances: spicy foods   Vitamin/Mineral Supplements: MVI, calcium and collagen    Previous methods of diet modification for weight loss: In last 6 months, started tracking intermittently in [employer] mariela. Identifies needing to moderate carb intake.   Working 8-4, M-F  Doesn't love to cook  Tries not " to drink calories  Identifies over eating at night. Comfort eating - stress management, reward.   Pt goals - decrease carb intake, decrease binge eating, healthier swaps for snacking     7/18/24- Doing very well with diet change. Consistently tracking in Lose It mariela. Average calorie intake of 1600 tamie/day, 85 gm protein/day, 16 gm fiber/day.  If dining out, looking for healthier options.  Significantly reduced snacking, also if having portioning out and much more conscious.    Significantly increased exercise  Potential challenges - needing more variety, higher cost of protein foods    9/10/24 - Working on increasing fiber intake Has tracked consistently for 100 days. Some days more difficult to track - busy schedule, or knows she will go over calorie goal. Eating three meals daily.     Today - Started Wegovy 5 weeks ago, mild headache and nausea. Lost 6 lbs initially.  Has been at a plateau of 268 lbs for last 4 weeks.     Recent Diet Recall (Work days M-F):  Breakfast: sugar-free energy drink and Pure/Atkins protein bar; occ a protein shake; cheese stick; yogurt   Lunch: 12 pm cafeteria at work - Grain bowl; salad; soup; burrito bowl   Occ piece of chocolate   Dinner: higher protein frozen meals;   After dinner Snacks: HP protein yogurt and louise chips; crackers   Beverages: Water, Celsius, black iced coffee, unsweet iced tea.  Alcohol: two glass wine per week   Dining Out: Once weekly dining out for dinner.     Progress Towards Previous Goals:  1) Follow <1600 calorie/day plan in Lose It mariela. - Has tracked consistently for 130 days. Avg 1300 tamie, 80 gm pro. Reports calories may have not been tracked as cloasley lately and likely intake is grater than 1300 tamie/d.  Protein: 85+ gm daily (20+% of total calories)   Carb: 45-60 gm per meal, 15-30 gm per snack or less. (30-50% of total calories)   Fat: 60-85 gm daily (30-40% of total calories)  Fiber: 25+ gm   2) Track exercise frequency, duration and intensity. -  "150-300 mins per week (30-45 mins/d) - PT, walking, golfing. Unsure what plan for activity will be into the Winter. Would like to explore studio classes.     Physical Activity:  Exercise: 210-315 mins per week - PT for ankle, biking, walking, golfing, WellBeats exercises, little of weight lifting.    Golfing 1-2 times weekly.     Nutrition Prescription  Recommended energy/nutrient modification.  <1300 calories/day     Nutrition Diagnosis  Obesity r/t long history of positive energy balance aeb BMI >30. - Improving    Nutrition Intervention  Materials/education provided on hypocaloric diet for weight loss.   Reviewed progress towards previous goals. Praised pt on the progress she has made.   Encouraged continued nutritional tracking aiming for 1300 tamie/d, discussed using more precise measuring for more accurate calorie estimation.   Discussed activity over the winter.   Co-developed goals to work towards.   Provided pt with list of goals and resources below via [employer].     Expected Engagement: good  Follow-Up Plans: pt would like to discuss supplements    Nutrition Goals  1) Follow 1300 calorie/day plan in Lose It mariela.   Protein: 85+ gm daily (25+% of total calories)   Carb: 45-60 gm per meal, 15-30 gm per snack or less. (30-50% of total calories)   Fat: 45 gm daily (30% of total calories)  Fiber: 25+ gm   2) Look into \"Class Pass\" for studio exercise classes to try.           Follow-Up:  1/14    Time spent with patient: 24 minutes.  Ann Marie Bermudez, RD, LD      "

## 2024-10-30 ENCOUNTER — LAB (OUTPATIENT)
Dept: LAB | Facility: CLINIC | Age: 59
End: 2024-10-30
Payer: COMMERCIAL

## 2024-10-30 ENCOUNTER — ALLIED HEALTH/NURSE VISIT (OUTPATIENT)
Dept: INTERNAL MEDICINE | Facility: CLINIC | Age: 59
End: 2024-10-30
Payer: COMMERCIAL

## 2024-10-30 DIAGNOSIS — Z23 NEED FOR VACCINATION: Primary | ICD-10-CM

## 2024-10-30 DIAGNOSIS — Z28.39 INCOMPLETE IMMUNIZATION SERIES: ICD-10-CM

## 2024-10-30 DIAGNOSIS — Z23 HIGH PRIORITY FOR 2019-NCOV VACCINE: ICD-10-CM

## 2024-10-30 PROCEDURE — 86580 TB INTRADERMAL TEST: CPT

## 2024-10-30 PROCEDURE — 86762 RUBELLA ANTIBODY: CPT

## 2024-10-30 PROCEDURE — 90471 IMMUNIZATION ADMIN: CPT

## 2024-10-30 PROCEDURE — 91320 SARSCV2 VAC 30MCG TRS-SUC IM: CPT

## 2024-10-30 PROCEDURE — 99207 PR NO CHARGE NURSE ONLY: CPT | Mod: 25

## 2024-10-30 PROCEDURE — 90750 HZV VACC RECOMBINANT IM: CPT

## 2024-10-30 PROCEDURE — 90480 ADMN SARSCOV2 VAC 1/ONLY CMP: CPT

## 2024-10-30 PROCEDURE — 86765 RUBEOLA ANTIBODY: CPT

## 2024-10-30 PROCEDURE — 36415 COLL VENOUS BLD VENIPUNCTURE: CPT

## 2024-10-30 PROCEDURE — 86735 MUMPS ANTIBODY: CPT

## 2024-10-30 NOTE — NURSING NOTE
"Patient is here today for a Mantoux (TST) test placement.    Is there a current order in the chart? No. Placed order according to standing order (reference the \"Skin Test- Tuberculosis Screening- Ambulatory Care\" standing order in [employer]). Review the Inclusion and Exclusion Criteria.      Inclusion Criteria  Pre-employment screening for healthcare workers and correctional facility staff - Administer two-step TST. Patient to return for second test in 1-3 weeks after first test is read.     Exclusion Criteria  None - Place order for Mantoux (TST) test per standing order.    Reason for Mantoux (TST) in patient's own words:     Patient needs form signed? No - form not needed per patient.    Instructed patient to wait for 15 minutes post injection and to report any reactions immediately to staff.    Told patient to return to clinic in 48-72 hours to have Mantoux (TST) read.       Prior to immunization administration, verified patients identity using patient s name and date of birth. Please see Immunization Activity for additional information.     Screening Questionnaire for Adult Immunization    Are you sick today?   No   Do you have allergies to medications, food, a vaccine component or latex?   No   Have you ever had a serious reaction after receiving a vaccination?   No   Do you have a long-term health problem with heart, lung, kidney, or metabolic disease (e.g., diabetes), asthma, a blood disorder, no spleen, complement component deficiency, a cochlear implant, or a spinal fluid leak?  Are you on long-term aspirin therapy?   No   Do you have cancer, leukemia, HIV/AIDS, or any other immune system problem?   No   Do you have a parent, brother, or sister with an immune system problem?   No   In the past 3 months, have you taken medications that affect  your immune system, such as prednisone, other steroids, or anticancer drugs; drugs for the treatment of rheumatoid arthritis, Crohn s disease, or psoriasis; or have " you had radiation treatments?   No   Have you had a seizure, or a brain or other nervous system problem?   No   During the past year, have you received a transfusion of blood or blood    products, or been given immune (gamma) globulin or antiviral drug?   No   For women: Are you pregnant or is there a chance you could become       pregnant during the next month?   No   Have you received any vaccinations in the past 4 weeks?   No     Immunization questionnaire answers were all negative.    I have reviewed the following standing orders:   This patient is due and qualifies for the Zoster vaccine.    Click here for Zoster Standing Order    I have reviewed the vaccines inclusion and exclusion criteria; No concerns regarding eligibility.     Patient instructed to remain in clinic for 15 minutes afterwards, and to report any adverse reactions.     Screening performed by Belen Cruz on 10/30/2024 at 2:59 PM.

## 2024-10-31 ENCOUNTER — VIRTUAL VISIT (OUTPATIENT)
Dept: ENDOCRINOLOGY | Facility: CLINIC | Age: 59
End: 2024-10-31
Payer: COMMERCIAL

## 2024-10-31 VITALS — WEIGHT: 268 LBS | BODY MASS INDEX: 37.52 KG/M2 | HEIGHT: 71 IN

## 2024-10-31 DIAGNOSIS — E66.9 OBESITY: ICD-10-CM

## 2024-10-31 DIAGNOSIS — Z71.3 NUTRITIONAL COUNSELING: Primary | ICD-10-CM

## 2024-10-31 LAB
MEV IGG SER IA-ACNC: 159 AU/ML
MEV IGG SER IA-ACNC: POSITIVE
MUMPS ANTIBODY IGG INSTRUMENT VALUE: 106 AU/ML
MUV IGG SER QL IA: POSITIVE
RUBV IGG SERPL QL IA: 11.7 INDEX
RUBV IGG SERPL QL IA: POSITIVE

## 2024-10-31 PROCEDURE — 97803 MED NUTRITION INDIV SUBSEQ: CPT | Mod: 95 | Performed by: DIETITIAN, REGISTERED

## 2024-10-31 PROCEDURE — 99207 PR NO CHARGE LOS: CPT | Mod: 95 | Performed by: DIETITIAN, REGISTERED

## 2024-10-31 ASSESSMENT — PAIN SCALES - GENERAL: PAINLEVEL_OUTOF10: NO PAIN (0)

## 2024-10-31 NOTE — LETTER
"10/31/2024       RE: Jacqui Grijalva  [address]  [place] [address]     Dear Colleague,    Thank you for referring your patient, Jacqui Grijalva, to the [facility]NAGEMENT [facility] [place] at [facility]. Please see a copy of my visit note below.    Video-Visit Details    Type of service:  Video Visit    Video Start Time: 12:05 PM   Video End Time: 12:29 PM    Originating Location (pt. Location): Home    Distant Location (provider location):  Offsite (providers home) [facility] [place]     Platform used for Video Visit: "[employer]      Weight Management Nutrition Consultation    Jacqui Grijalva is a 59 year old female presents today for return weight management nutrition consultation.  Patient referred by Wendy Gonzalez PA-C on May 30, 2024.    Patient with Co-morbidities of obesity includin/29/2024     3:41 PM   --   I have the following health issues associated with obesity Sleep Apnea    GERD (Reflux)    Stress Incontinence    Osteoarthritis (joint disease)   I have the following symptoms associated with obesity Knee Pain    Fatigue     MAFLD     Anthropometrics:  Initial:  Estimated body mass index is 41 kg/m  as calculated from the following:    Height as of 24: 1.803 m (5' 11\").    Weight as of 24: 133.4 kg (294 lb).      Tanita Scale:  Date: 24  BW: 272 lbs - down 22 lbs from May body comp  Fat%: 46.9% (desirable range: 23--33.9%) - down 4%   Muscle%: 50.4%  Muscle Mass: 137.2 lbs - Up 1 lb   Visceral Fat Ratin (optimal less than 13) - Down two points  BMR: 2038 kcal    Current:  Estimated body mass index is 37.4 kg/m  as calculated from the following:    Height as of this encounter: 1.803 m (5' 10.98\").    Weight as of this encounter: 121.6 kg (268 lb). (-8 lbs from last RD visit 9/10, -26 lbs from initial)       Medications for Weight " Loss:  Wegovy    NUTRITION HISTORY  Food allergies: None  Food intolerances: spicy foods   Vitamin/Mineral Supplements: MVI, calcium and collagen    Previous methods of diet modification for weight loss: In last 6 months, started tracking intermittently in [employer] mariela. Identifies needing to moderate carb intake.   Working 8-4, M-F  Doesn't love to cook  Tries not to drink calories  Identifies over eating at night. Comfort eating - stress management, reward.   Pt goals - decrease carb intake, decrease binge eating, healthier swaps for snacking     7/18/24- Doing very well with diet change. Consistently tracking in nPicker It mariela. Average calorie intake of 1600 tamie/day, 85 gm protein/day, 16 gm fiber/day.  If dining out, looking for healthier options.  Significantly reduced snacking, also if having portioning out and much more conscious.    Significantly increased exercise  Potential challenges - needing more variety, higher cost of protein foods    9/10/24 - Working on increasing fiber intake Has tracked consistently for 100 days. Some days more difficult to track - busy schedule, or knows she will go over calorie goal. Eating three meals daily.     Today - Started Wegovy 5 weeks ago, mild headache and nausea. Lost 6 lbs initially.  Has been at a plateau of 268 lbs for last 4 weeks.     Recent Diet Recall (Work days M-F):  Breakfast: sugar-free energy drink and Pure/Atkins protein bar; occ a protein shake; cheese stick; yogurt   Lunch: 12 pm cafeteria at work - Grain bowl; salad; soup; burrito bowl   Occ piece of chocolate   Dinner: higher protein frozen meals;   After dinner Snacks: HP protein yogurt and louise chips; crackers   Beverages: Water, Celsius, black iced coffee, unsweet iced tea.  Alcohol: two glass wine per week   Dining Out: Once weekly dining out for dinner.     Progress Towards Previous Goals:  1) Follow <1600 calorie/day plan in Lose It mariela. - Has tracked consistently for 130 days. Avg 1300 tamie, 80 gm pro.  "Reports calories may have not been tracked as cloasley lately and likely intake is grater than 1300 tamie/d.  Protein: 85+ gm daily (20+% of total calories)   Carb: 45-60 gm per meal, 15-30 gm per snack or less. (30-50% of total calories)   Fat: 60-85 gm daily (30-40% of total calories)  Fiber: 25+ gm   2) Track exercise frequency, duration and intensity. - 150-300 mins per week (30-45 mins/d) - PT, walking, golfing. Unsure what plan for activity will be into the Winter. Would like to explore studio classes.     Physical Activity:  Exercise: 210-315 mins per week - PT for ankle, biking, walking, golfing, WellBeats exercises, little of weight lifting.    Golfing 1-2 times weekly.     Nutrition Prescription  Recommended energy/nutrient modification.  <1300 calories/day     Nutrition Diagnosis  Obesity r/t long history of positive energy balance aeb BMI >30. - Improving    Nutrition Intervention  Materials/education provided on hypocaloric diet for weight loss.   Reviewed progress towards previous goals. Praised pt on the progress she has made.   Encouraged continued nutritional tracking aiming for 1300 tamie/d, discussed using more precise measuring for more accurate calorie estimation.   Discussed activity over the winter.   Co-developed goals to work towards.   Provided pt with list of goals and resources below via [employer].     Expected Engagement: good  Follow-Up Plans: pt would like to discuss supplements    Nutrition Goals  1) Follow 1300 calorie/day plan in Lose It mariela.   Protein: 85+ gm daily (25+% of total calories)   Carb: 45-60 gm per meal, 15-30 gm per snack or less. (30-50% of total calories)   Fat: 45 gm daily (30% of total calories)  Fiber: 25+ gm   2) Look into \"Class Pass\" for studio exercise classes to try.           Follow-Up:  1/14    Time spent with patient: 24 minutes.  Ann Mraie Bermudez, RD, LD        Again, thank you for allowing me to participate in the care of your patient.      Sincerely,    Ann Marie" Lara, ROSEMARIE

## 2024-10-31 NOTE — PATIENT INSTRUCTIONS
"Rinku Osman,     Follow-up with dietitian on 1/14    Thank you,    Ann Marie Bermudez, ROSEMARIE, LD  If you would like to schedule or reschedule an appointment with the RD, please call [phone]    Nutrition Goals  1) Follow 1300 calorie/day plan in Lose It mariela.   Protein: 85+ gm daily (25+% of total calories)   Carb: 45-60 gm per meal, 15-30 gm per snack or less. (30-50% of total calories)   Fat: 45 gm daily (30% of total calories)  Fiber: 25+ gm   2) Look into \"Class Pass\" for studio exercise classes to try.                 [facility]   Healthy Lifestyle Group    Healthy Lifestyle Coaching Group?  This is a 60 minute virtual coaching group for those who want to lead a healthier lifestyle. Come together to set goals and overcome barriers in a supportive group environment. We will address the four pillars of health--nutrition, exercise, sleep and emotional well-being. This group is highly recommended for those who are participating in the 24 week Healthy Lifestyle Plan and our Health Coaching sessions. All are welcome!    WHEN: This group meets the first Friday of the month, 12:30 PM - 1:30 PM online, via a zoom meeting.      FACILITATOR: Led by National Board Certified Health and , Meri Greenberg [facility].     TO REGISTER: Please call the Call Center at [phone] to register. You will get an appointment to attend in [place]. Fifteen minutes prior to the meeting, complete the e-check in and you will get the link to join the meeting.  There is no charge to attend this group and space is limited.   Please register for each month you wish to attend    PLEASE NOTE: There will be NO GROUP on March 7 and July 4, 2025 2024 and 2025 GROUP MEETING DATES:   October 4, 2024  November 1, 2024  December 6, 2024  January 3, 2025  February 7, 2025  No meeting March 7, 2025  April 4, 2025  May 2, 2025  June 6, 2025  No meeting July 4, 2025 August 1, 2025      Work with A Health " !  Virtual 1:1 Sessions are Available through [facility]    To learn more, call to schedule a free, Health  Q&A appointment: [phone]     What is Health Coaching?  Do you know what you are supposed to do, but you just aren't doing it?  Then, HEALTH COACHING may help you!   Get unstuck and move forward with the support of a professionally trained NBC-HWC (National Board-Certified Health and ) who uses evidence-based approaches to help you move forward with healthy lifestyle changes in the areas of weight loss, stress management and overall well-being.    Health Coaches help you identify goals that will work best for you. Health Coaches provide support and encouragement with overcoming barriers and help you to find inspiration and motivation to lead a healthy lifestyle.    Option one:  Health Coaching 3-Pack; Three, 30-minute Health Coaching Visits, for $99  Visits are done virtually (phone or video)  This is a self pay service; we do not accept insurance for lauren coaching.    Option two:   The 24 week Plan; 11 Health Coaching Visits, and a 7 months subscription to [employer]- on-demand fitness, nutrition and mindfulness classes, for $499 (employee discounts may be available). Participants will also meet regularly with a weight management Medical Provider and a Registered/Licensed Dietician.  This is a self-pay service; we do not accept insurance for health coaching.    To Schedule a free Health  Q&A appointment to learn more,  call [phone].

## 2024-10-31 NOTE — NURSING NOTE
Current patient location: [address]   [place] [address]    Is the patient currently in the state of [place]? YES    Visit mode:VIDEO    If the visit is dropped, the patient can be reconnected by: VIDEO VISIT: Text to cell phone:   Telephone Information:   Mobile [phone]       Will anyone else be joining the visit? NO  (If patient encounters technical issues they should call [phone]    Are changes needed to the allergy or medication list? No    Are refills needed on medications prescribed by this physician? NO    Rooming Documentation:  Questionnaire(s) not pre-assigned    Reason for visit: Follow Up    Olga KRAMER

## 2024-11-01 ENCOUNTER — ALLIED HEALTH/NURSE VISIT (OUTPATIENT)
Dept: NURSING | Facility: CLINIC | Age: 59
End: 2024-11-01
Payer: COMMERCIAL

## 2024-11-01 ENCOUNTER — VIRTUAL VISIT (OUTPATIENT)
Dept: ENDOCRINOLOGY | Facility: CLINIC | Age: 59
End: 2024-11-01

## 2024-11-01 DIAGNOSIS — E66.3 OVERWEIGHT: Primary | ICD-10-CM

## 2024-11-01 DIAGNOSIS — Z11.1 SCREENING EXAMINATION FOR PULMONARY TUBERCULOSIS: Primary | ICD-10-CM

## 2024-11-01 LAB
PPDINDURATION: 0 MM (ref 0–4.99)
PPDREDNESS: NORMAL

## 2024-11-01 PROCEDURE — 99207 PR NO CHARGE NURSE ONLY: CPT

## 2024-11-01 PROCEDURE — 99207 PR NO CHARGE LOS: CPT | Mod: 95

## 2024-11-01 NOTE — PROGRESS NOTES
24 wk visit 7  Wendy Agosto    DO NOT COUNT as visit; less then 5 minutes bc pt was not feeling well. Thinks her shingles vaccine or GLP1 shot crept up on her and so ended early    Pt will reschedule     Planning to attend support group today     TIME SPENT: 5 min (count next visit as visit 7)    I checked pt in so too late to early cancel it.    Meri Greenberg  [facility], National Board Certified Health &   Lead Health   [facility] Comprehensive Weight Management  [email or web address]  [email or web address]   To schedule: [phone]   Gender pronouns: she/her

## 2024-11-01 NOTE — LETTER
"11/1/2024       RE: Jacqui Grijalva  [address]  [place] [address]     Dear Colleague,    Thank you for referring your patient, Jacqui Grijalva, to the [facility]NAGEMENT [facility]IN[facility] [place] at [facility]. Please see a copy of my visit note below.    Coaching Group  60 minutes  6 participants present    Opening Meditation  Check in on \"wins\"  Check in reflections; What brings me down/keeps me stuck; what inspires me and what keeps me going  Pts shared on the above questions    Meri Greenberg  [facility], National Board Certified Health &   Lead Health   [facility] Comprehensive Weight Management  [email or web address]   To schedule: [phone]   Gender pronouns: she/her      Again, thank you for allowing me to participate in the care of your patient.      Sincerely,    Meri Greenberg    "

## 2024-11-01 NOTE — LETTER
11/1/2024       RE: Jacqui Grijalva  [address]  [place] [address]     Dear Colleague,    Thank you for referring your patient, Jacqui Grijalva, to the [facility] MANAGEMENT CLIN[facility] [place] at [facility]. Please see a copy of my visit note below.    24 wk visit 7  Wendy Agosto    DO NOT COUNT as visit; less then 5 minutes bc pt was not feeling well. Thinks her shingles vaccine or GLP1 shot crept up on her and so ended early    Pt will reschedule     Planning to attend support group today     TIME SPENT: 5 min (count next visit as visit 7)    I checked pt in so too late to early cancel it.    Meri Greenberg  [facility], National Board Certified Health &   Lead Health   [facility] Comprehensive Weight Management  [email or web address]   To schedule: [phone]   Gender pronouns: she/her         Again, thank you for allowing me to participate in the care of your patient.      Sincerely,    Meri Greenberg

## 2024-11-01 NOTE — PROGRESS NOTES
"Coaching Group  60 minutes  6 participants present    Opening Meditation  Check in on \"wins\"  Check in reflections; What brings me down/keeps me stuck; what inspires me and what keeps me going  Pts shared on the above questions    Meri Greenberg  [facility], National Board Certified Health &   Lead Health   [facility] Comprehensive Weight Management  [email or web address]  [email or web address]   To schedule: [phone]   Gender pronouns: she/her  "

## 2024-11-01 NOTE — PROGRESS NOTES
Patient is here today for a Mantoux (TST) test results.    Did patient return to clinic 48-72 hours from Mantoux (TST) placement: Yes -     PPD Induration   Date Value Ref Range Status   11/01/2024 0 0 - 4.99 mm Final     PPD Redness   Date Value Ref Range Status   11/01/2024 Not Present  Final           Induration Size? Induration <5mm - Enter results in Enter/Edit Activity. Route results to ordering provider.     Patient needs form signed? Yes. Follow clinic form process.     Patient reports having previously had the BCG Vaccine: No    Does patient need a two step?  Yes - placed order according to standing order or notified LP for need for next TST.  Instructed patient when to return to the clinic.      Shameka Watson RN

## 2024-11-03 PROBLEM — Z85.820 HISTORY OF MELANOMA: Status: ACTIVE | Noted: 2024-11-03

## 2024-11-03 PROBLEM — L91.8 INFLAMED ACROCHORDON: Status: ACTIVE | Noted: 2024-11-03

## 2024-11-03 PROBLEM — L90.5 SCAR IRRITATION: Status: ACTIVE | Noted: 2024-11-03

## 2024-11-06 ENCOUNTER — THERAPY VISIT (OUTPATIENT)
Dept: PHYSICAL THERAPY | Facility: CLINIC | Age: 59
End: 2024-11-06
Payer: COMMERCIAL

## 2024-11-06 DIAGNOSIS — M17.31 POST-TRAUMATIC OSTEOARTHRITIS OF RIGHT KNEE: Primary | ICD-10-CM

## 2024-11-06 PROCEDURE — 97110 THERAPEUTIC EXERCISES: CPT | Mod: GP | Performed by: PHYSICAL THERAPIST

## 2024-11-06 PROCEDURE — 97112 NEUROMUSCULAR REEDUCATION: CPT | Mod: GP | Performed by: PHYSICAL THERAPIST

## 2024-11-06 NOTE — PROGRESS NOTES
11/06/24 0500   Appointment Info   Signing clinician's name / credentials Romel Bain, PT   Total/Authorized Visits 8 per PT   Visits Used 4   Medical Diagnosis Class 3 severe obesity with serious comorbidity and body mass index (BMI) of 40.0 to 44.9 in adult, unspecified obesity type (H)  Post-traumatic osteoarthritis of right knee   PT Tx Diagnosis Right knee pain, physical deconditioning   Progress Note/Certification   Onset of illness/injury or Date of Surgery 05/30/24   Therapy Frequency 1x per month   Predicted Duration 4 months+2 months per PN note   Progress Note Completed Date 11/06/24   GOALS   PT Goals 3   PT Goal 3   Goal Identifier ambulation   Goal Description patient will be able to ambulate >4 miles without a rest break with 0/10 knee pain and no rest breaks   Rationale to maximize safety and independence with performance of ADLs and functional tasks;to maximize safety and independence with self cares;to maximize safety and independence within the community   Goal Progress can walks 3 miles without rest breaks, 1-2/10 knee pain   Target Date 10/26/24   Subjective Report   Subjective Report Patient reports full compliance with her lower extremity exercises, but not her upper extremity exercise. She reports no longer walking outside and not playing golf in the cold weather. She is starting a new exercise class at [employer]. she plans to try a jazzercise, zoomba and barre classes. Patient reports some continued bilateral knee pain, but this does not limit her activity. SHe reports the pain over the inferior and lateral aspect of her anteiror knees bilaterally   Objective Measure 1   Objective Measure pelvifemoral stabliity   Details good with cues. fatigued at last set   Objective Measure 2   Objective Measure palpation   Details mild tenderness over patellar tendon and anteriolateral joint line bilaterally   Treatment Interventions (PT)   Interventions Therapeutic  Procedure/Exercise;Therapeutic Activity;Neuromuscular Re-education   Therapeutic Procedure/Exercise   Therapeutic Procedures: strength, endurance, ROM, flexibility minutes (09440) 30   PTRx Ther Proc 1 Bicep Curls with Dumbbells   PTRx Ther Proc 1 - Details x20 HEP   PTRx Ther Proc 2 Supine Elbow Extension   PTRx Ther Proc 2 - Details x20 HEP only   PTRx Ther Proc 3 Bent Over Rows   PTRx Ther Proc 3 - Details x20 HEP only   PTRx Ther Proc 4 hamstsring curl on exercise ball   PTRx Ther Proc 4 - Details x20   PTRx Ther Proc 5 Side Stepping With Theraband   PTRx Ther Proc 5 - Details X20 YTB around arches   PTRx Ther Proc 6 Standing Fire Hydrant Level 3   PTRx Ther Proc 6 - Details YTB x20 bilat   PTRx Ther Proc 7 Thread the Needle   PTRx Ther Proc 7 - Details standing x20   PTRx Ther Proc 8 leg press   PTRx Ther Proc 8 - Details 132 lbs 2x20   Skilled Intervention exeircse selection, education   Patient Response/Progress no pain reported, fatigued quickly   PTRx Ther Proc 9 knee extension while sitting   PTRx Ther Proc 9 - Details BTB x20 bilat   Neuromuscular Re-education   Neuromuscular re-ed of mvmt, balance, coord, kinesthetic sense, posture, proprioception minutes (87673) 12   Skilled Intervention verbal cues   Patient Response/Progress good pelvifemoral stability   PTRx Neuro Re-ed 2 star reach with slide disk   PTRx Neuro Re-ed 2 - Details x20 bilat - reaching to numbers on the clock   Education   Learner/Method Listening   Plan   Home program Ptrx, bike, walk   Plan for next session progress UE and LE strengthening   Total Session Time   Timed Code Treatment Minutes 42   Total Treatment Time (sum of timed and untimed services) 42         PLAN  Continue therapy per current plan of care with an additional 1 session per month for 2 more months.     Beginning/End Dates of Progress Note Reporting Period:  11/06/24 to 11/06/2024    Referring Provider:  Wendy Gonzalez

## 2024-11-07 ENCOUNTER — PATIENT OUTREACH (OUTPATIENT)
Dept: GASTROENTEROLOGY | Facility: CLINIC | Age: 59
End: 2024-11-07
Payer: COMMERCIAL

## 2024-11-07 DIAGNOSIS — Z12.11 SPECIAL SCREENING FOR MALIGNANT NEOPLASMS, COLON: Primary | ICD-10-CM

## 2024-11-07 NOTE — PROGRESS NOTES
"[facility] Screening Colonoscopy Referral Review    Patient meets the inclusion criteria for screening colonoscopy standing order.    Ordering/Referring Provider:  Bijal Browne      BMI: Estimated body mass index is 37.4 kg/m  as calculated from the following:    Height as of 10/31/24: 1.803 m (5' 10.98\").    Weight as of 10/31/24: 121.6 kg (268 lb).     Sedation:  Does patient have any of the following conditions affecting sedation?  No medical conditions affecting sedation.    Previous Scopes:  Any previous recommendations or follow up needs based on previous scope?  na / No recommendations.    Medical Concerns to Postpone Order:  Does patient have any of the following medical concerns that should postpone/delay colonoscopy referral?  No medical conditions affecting colonoscopy referral.    Final Referral Details:  Based on patient's medical history patient is appropriate for referral order with moderate sedation. If patient's BMI > 50 do not schedule in [facility].  "

## 2024-11-11 ENCOUNTER — OFFICE VISIT (OUTPATIENT)
Dept: FAMILY MEDICINE | Facility: CLINIC | Age: 59
End: 2024-11-11
Payer: COMMERCIAL

## 2024-11-11 ENCOUNTER — OFFICE VISIT (OUTPATIENT)
Dept: SLEEP MEDICINE | Facility: CLINIC | Age: 59
End: 2024-11-11
Payer: COMMERCIAL

## 2024-11-11 VITALS — BODY MASS INDEX: 37.24 KG/M2 | OXYGEN SATURATION: 97 % | HEART RATE: 98 BPM | HEIGHT: 71 IN | WEIGHT: 266 LBS

## 2024-11-11 VITALS
RESPIRATION RATE: 19 BRPM | BODY MASS INDEX: 37.03 KG/M2 | SYSTOLIC BLOOD PRESSURE: 115 MMHG | OXYGEN SATURATION: 99 % | WEIGHT: 264.5 LBS | DIASTOLIC BLOOD PRESSURE: 82 MMHG | HEIGHT: 71 IN | TEMPERATURE: 98 F | HEART RATE: 92 BPM

## 2024-11-11 DIAGNOSIS — M15.9 OSTEOARTHRITIS OF MULTIPLE JOINTS, UNSPECIFIED OSTEOARTHRITIS TYPE: ICD-10-CM

## 2024-11-11 DIAGNOSIS — R06.83 SNORING: ICD-10-CM

## 2024-11-11 DIAGNOSIS — G47.33 OSA (OBSTRUCTIVE SLEEP APNEA): Primary | ICD-10-CM

## 2024-11-11 DIAGNOSIS — G47.33 OBSTRUCTIVE SLEEP APNEA SYNDROME: ICD-10-CM

## 2024-11-11 DIAGNOSIS — K21.9 GASTROESOPHAGEAL REFLUX DISEASE WITHOUT ESOPHAGITIS: ICD-10-CM

## 2024-11-11 DIAGNOSIS — Z85.820 HISTORY OF MELANOMA: ICD-10-CM

## 2024-11-11 DIAGNOSIS — Z00.00 ROUTINE HISTORY AND PHYSICAL EXAMINATION OF ADULT: Primary | ICD-10-CM

## 2024-11-11 DIAGNOSIS — K76.0 METABOLIC DYSFUNCTION-ASSOCIATED STEATOTIC LIVER DISEASE (MASLD): ICD-10-CM

## 2024-11-11 DIAGNOSIS — E66.812 CLASS 2 SEVERE OBESITY DUE TO EXCESS CALORIES WITH SERIOUS COMORBIDITY AND BODY MASS INDEX (BMI) OF 36.0 TO 36.9 IN ADULT (H): ICD-10-CM

## 2024-11-11 DIAGNOSIS — E66.01 CLASS 2 SEVERE OBESITY DUE TO EXCESS CALORIES WITH SERIOUS COMORBIDITY AND BODY MASS INDEX (BMI) OF 36.0 TO 36.9 IN ADULT (H): ICD-10-CM

## 2024-11-11 LAB
EST. AVERAGE GLUCOSE BLD GHB EST-MCNC: 108 MG/DL
HBA1C MFR BLD: 5.4 % (ref 0–5.6)

## 2024-11-11 PROCEDURE — 99000 SPECIMEN HANDLING OFFICE-LAB: CPT | Performed by: PATHOLOGY

## 2024-11-11 PROCEDURE — 99396 PREV VISIT EST AGE 40-64: CPT | Performed by: NURSE PRACTITIONER

## 2024-11-11 PROCEDURE — 83970 ASSAY OF PARATHORMONE: CPT

## 2024-11-11 PROCEDURE — 82306 VITAMIN D 25 HYDROXY: CPT

## 2024-11-11 PROCEDURE — 80061 LIPID PANEL: CPT

## 2024-11-11 PROCEDURE — 82310 ASSAY OF CALCIUM: CPT

## 2024-11-11 PROCEDURE — 99215 OFFICE O/P EST HI 40 MIN: CPT | Performed by: INTERNAL MEDICINE

## 2024-11-11 SDOH — HEALTH STABILITY: PHYSICAL HEALTH: ON AVERAGE, HOW MANY DAYS PER WEEK DO YOU ENGAGE IN MODERATE TO STRENUOUS EXERCISE (LIKE A BRISK WALK)?: 3 DAYS

## 2024-11-11 SDOH — HEALTH STABILITY: PHYSICAL HEALTH: ON AVERAGE, HOW MANY MINUTES DO YOU ENGAGE IN EXERCISE AT THIS LEVEL?: 60 MIN

## 2024-11-11 ASSESSMENT — SLEEP AND FATIGUE QUESTIONNAIRES
HOW LIKELY ARE YOU TO NOD OFF OR FALL ASLEEP WHILE SITTING QUIETLY AFTER LUNCH WITHOUT ALCOHOL: WOULD NEVER DOZE
HOW LIKELY ARE YOU TO NOD OFF OR FALL ASLEEP WHILE SITTING INACTIVE IN A PUBLIC PLACE: WOULD NEVER DOZE
HOW LIKELY ARE YOU TO NOD OFF OR FALL ASLEEP WHILE LYING DOWN TO REST IN THE AFTERNOON WHEN CIRCUMSTANCES PERMIT: MODERATE CHANCE OF DOZING
HOW LIKELY ARE YOU TO NOD OFF OR FALL ASLEEP WHILE WATCHING TV: SLIGHT CHANCE OF DOZING
HOW LIKELY ARE YOU TO NOD OFF OR FALL ASLEEP WHEN YOU ARE A PASSENGER IN A CAR FOR AN HOUR WITHOUT A BREAK: SLIGHT CHANCE OF DOZING
HOW LIKELY ARE YOU TO NOD OFF OR FALL ASLEEP WHILE SITTING AND TALKING TO SOMEONE: WOULD NEVER DOZE
HOW LIKELY ARE YOU TO NOD OFF OR FALL ASLEEP IN A CAR, WHILE STOPPED FOR A FEW MINUTES IN TRAFFIC: WOULD NEVER DOZE
HOW LIKELY ARE YOU TO NOD OFF OR FALL ASLEEP WHILE SITTING AND READING: SLIGHT CHANCE OF DOZING

## 2024-11-11 ASSESSMENT — PAIN SCALES - GENERAL: PAINLEVEL_OUTOF10: NO PAIN (0)

## 2024-11-11 ASSESSMENT — SOCIAL DETERMINANTS OF HEALTH (SDOH): HOW OFTEN DO YOU GET TOGETHER WITH FRIENDS OR RELATIVES?: ONCE A WEEK

## 2024-11-11 NOTE — PATIENT INSTRUCTIONS
"          MY TREATMENT INFORMATION FOR SLEEP APNEA-  Jacqui Grijalva    DOCTOR : Syd Bashir MD    Am I having a sleep study at a sleep center?  --->Due to normal delays, you will be contacted within 2-4 weeks to schedule    Am I having a home sleep study?  --->Watch the video for the device you are using:    -/drop off device-   https://www.[employer].com/watch?v=yGGFBdELGhk    -Disposable device sent out require phone/computer application-   https://www.[employer].com/watch?v=BCce_vbiwxE      Frequently asked questions:  1. What is Obstructive Sleep Apnea (ANUP)? ANUP is the most common type of sleep apnea. Apnea means, \"without breath.\"  Apnea is most often caused by narrowing or collapse of the upper airway as muscles relax during sleep.   Almost everyone has occasional apneas. Most people with sleep apnea have had brief interruptions at night frequently for many years.  The severity of sleep apnea is related to how frequent and severe the events are.   2. What are the consequences of ANUP? Symptoms include: feeling sleepy during the day, snoring loudly, gasping or stopping of breathing, trouble sleeping, and occasionally morning headaches or heartburn at night.  Sleepiness can be serious and even increase the risk of falling asleep while driving. Other health consequences may include development of high blood pressure and other cardiovascular disease in persons who are susceptible. Untreated ANUP  can contribute to heart disease, stroke and diabetes.   3. What are the treatment options? In most situations, sleep apnea is a lifelong disease that must be managed with daily therapy. Medications are not effective for sleep apnea and surgery is generally not considered until other therapies have been tried. Your treatment is your choice . Continuous Positive Airway (CPAP) works right away and is the therapy that is effective in nearly everyone. An oral device to hold your jaw forward is usually the next most " reliable option. Other options include postioning devices (to keep you off your back), weight loss, and surgery including a tongue pacing device. There is more detail about some of these options below.  4. Are my sleep studies covered by insurance? Although we will request verification of coverage, we advise you also check in advance of the study to ensure there is coverage.    Important tips for those choosing CPAP and similar devices  REMEMBER-IF YOU RECEIVE A CALL FROM  [phone]-->IT IS TO SETUP A DEVICE  For new devices, sign up for device MARIELA to monitor your device for your followup visits  We encourage you to utilize the [employer] mariela or website ( https://[employer].EatWith/ ) to monitor your therapy progress and share the data with your healthcare team when you discuss your sleep apnea.                                                    Know your equipment:  CPAP is continuous positive airway pressure that prevents obstructive sleep apnea by keeping the throat from collapsing while you are sleeping. In most cases, the device is  smart  and can slowly self-adjusts if your throat collapses and keeps a record every day of how well you are treated-this information is available to you and your care team.  BPAP is bilevel positive airway pressure that keeps your throat open and also assists each breath with a pressure boost to maintain adequate breathing.  Special kinds of BPAP are used in patients who have inadequate breathing from lung or heart disease. In most cases, the device is  smart  and can slowly self-adjusts to assist breathing. Like CPAP, the device keeps a record of how well you are treated.  Your mask is your connection to the device. You get to choose what feels most comfortable and the staff will help to make sure if fits. Here: are some examples of the different masks that are available: Magnetic mask aids may assist with use but there are safety issues that should be addressed when  considering with magnets* ( see end of discussion).       Key points to remember on your journey with sleep apnea:  Sleep study.  PAP devices often need to be adjusted during a sleep study to show that they are effective and adjusted right.  Good tips to remember: Try wearing just the mask during a quiet time during the day so your body adapts to wearing it. A humidifier is recommended for comfort in most cases to prevent drying of your nose and throat. Allergy medication from your provider may help you if you are having nasal congestion.  Getting settled-in. It takes more than one night for most of us to get used to wearing a mask. Try wearing just the mask during a quiet time during the day so your body adapts to wearing it. A humidifier is recommended for comfort in most cases. Our team will work with you carefully on the first day and will be in contact within 4 days and again at 2 and 4 weeks for advice and remote device adjustments. Your therapy is evaluated by the device each day.   Use it every night. The more you are able to sleep naturally for 7-8 hours, the more likely you will have good sleep and to prevent health risks or symptoms from sleep apnea. Even if you use it 4 hours it helps. Occasionally all of us are unable to use a medical therapy, in sleep apnea, it is not dangerous to miss one night.   Communicate. Call our skilled team on the number provided on the first day if your visit for problems that make it difficult to wear the device. Over 2 out of 3 patients can learn to wear the device long-term with help from our team. Remember to call our team or your sleep providers if you are unable to wear the device as we may have other solutions for those who cannot adapt to mask CPAP therapy. It is recommended that you sleep your sleep provider within the first 3 months and yearly after that if you are not having problems.   Use it for your health. We encourage use of CPAP masks during daytime quiet  periods to allow your face and brain to adapt to the sensation of CPAP so that it will be a more natural sensation to awaken to at night or during naps. This can be very useful during the first few weeks or months of adapting to CPAP though it does not help medically to wear CPAP during wakefulness and  should not be used as a strategy just to meet guidelines.  Take care of your equipment. Make sure you clean your mask and tubing using directions every day and that your filter and mask are replaced as recommended or if they are not working.     *Masks with magnets:  Updated Contraindications  Masks with magnetic components are contraindicated for use by patients where they, or anyone in close physical contact while using the mask, have the following:   Active medical implants that interact with magnets (i.e., pacemakers, implantable cardioverter defibrillators (ICD), neurostimulators, cerebrospinal fluid (CSF) shunts, insulin/infusion pumps)   Metallic implants/objects containing ferromagnetic material (i.e., aneurysm clips/flow disruption devices, embolic coils, stents, valves, electrodes, implants to restore hearing or balance with implanted magnets, ocular implants, metallic splinters in the eye)  Updated Warning  Keep the mask magnets at a safe distance of at least 6 inches (150 mm) away from implants or medical devices that may be adversely affected by magnetic interference. This warning applies to you or anyone in close physical contact with your mask. The magnets are in the frame and lower headgear clips, with a magnetic field strength of up to 400mT. When worn, they connect to secure the mask but may inadvertently detach while asleep.  Implants/medical devices, including those listed within contraindications, may be adversely affected if they change function under external magnetic fields or contain ferromagnetic materials that attract/repel to magnetic fields (some metallic implants, e.g., contact lenses  with metal, dental implants, metallic cranial plates, screws, wilner hole covers, and bone substitute devices). Consult your physician and  of your implant / other medical device for information on the potential adverse effects of magnetic fields.    BESIDES CPAP, WHAT OTHER THERAPIES ARE THERE?    Positioning Device  Positioning devices are generally used when sleep apnea is mild and only occurs on your back.This example shows a pillow that straps around the waist. It may be appropriate for those whose sleep study shows milder sleep apnea that occurs primarily when lying flat on one's back. Preliminary studies have shown benefit but effectiveness at home may need to be verified by a home sleep test. These devices are generally not covered by medical insurance.  Examples of devices that maintain sleeping on the back to prevent snoring and mild sleep apnea.    Belt type body positioner  http://Flud/    Electronic reminder  http://nightshifttherapy.StudyMax/            Oral Appliance  What is oral appliance therapy?  An oral appliance device fits on your teeth at night like a retainer used after having braces. The device is made by a specialized dentist and requires several visits over 1-2 months before a manufactured device is made to fit your teeth and is adjusted to prevent your sleep apnea. Once an oral device is working properly, snoring should be improved. A home sleep test may be recommended at that time if to determine whether the sleep apnea is adequately treated.       Some things to remember:  -Oral devices are often, but not always, covered by your medical insurance. Be sure to check with your insurance provider.   -If you are referred for oral therapy, you will be given a list of specialized dentists to consider or you may choose to visit the Web site of the American Academy of Dental Sleep Medicine  -Oral devices are less likely to work if you have severe sleep apnea or are extremely  overweight.     More detailed information  An oral appliance is a small acrylic device that fits over the upper and lower teeth  (similar to a retainer or a mouth guard). This device slightly moves jaw forward, which moves the base of the tongue forward, opens the airway, improves breathing for effective treat snoring and obstructive sleep apnea in perhaps 7 out of 10 people .  The best working devices are custom-made by a dental device  after a mold is made of the teeth 1, 2, 3.  When is an oral appliance indicated?  Oral appliance therapy is recommended as a first-line treatment for patients with primary snoring, mild sleep apnea, and for patients with moderate sleep apnea who prefer appliance therapy to use of CPAP4, 5. Severity of sleep apnea is determined by sleep testing and is based on the number of respiratory events per hour of sleep.   How successful is oral appliance therapy?  The success rate of oral appliance therapy in patients with mild sleep apnea is 75-80% while in patients with moderate sleep apnea it is 50-70%. The chance of success in patients with severe sleep apnea is 40-50%. The research also shows that oral appliances have a beneficial effect on the cardiovascular health of ANUP patients at the same magnitude as CPAP therapy7.  Oral appliances should be a second-line treatment in cases of severe sleep apnea, but if not completely successful then a combination therapy utilizing CPAP plus oral appliance therapy may be effective. Oral appliances tend to be effective in a broad range of patients although studies show that the patients who have the highest success are females, younger patients, those with milder disease, and less severe obesity. 3, 6.   Finding a dentist that practices dental sleep medicine  Specific training is available through the American Academy of Dental Sleep Medicine for dentists interested in working in the field of sleep. To find a dentist who is educated in  the field of sleep and the use of oral appliances, near you, visit the Web site of the American Academy of Dental Sleep Medicine.    References  1. Dalton, et al. Objectively measured vs self-reported compliance during oral appliance therapy for sleep-disordered breathing. Chest 2013; 144(5): 5181-1563.  2. Calin et al. Objective measurement of compliance during oral appliance therapy for sleep-disordered breathing. Thorax 2013; 68(1): 91-96.  3. Arcadio et al. Mandibular advancement devices in 620 men and women with ANUP and snoring: tolerability and predictors of treatment success. Chest 2004; 125: 2906-8599.  4. Jairo, et al. Oral appliances for snoring and ANUP: a review. Sleep 2006; 29: 244-262.  5. Bautista et al. Oral appliance treatment for ANUP: an update. J Clin Sleep Med 2014; 10(2): 215-227.  6. Nelson et al. Predictors of OSAH treatment outcome. J Dent Res 2007; 86: 7454-0266.      Weight Loss:   Your Body mass index is 37.1 kg/m .    Being overweight does not necessarily mean you will have health consequences.  Those who have BMI over 35 or over 27 with existing medical conditions carries greater risk.   Weight loss decreases severity of sleep apnea in most people with obesity. For those with mild obesity who have developed snoring with weight gain, even 15-30 pound weight loss can improve and occasionally milder eliminate sleep apnea.  Structured and life-long dietary and health habits are necessary to lose weight and keep healthier weight levels.     The Comprehensive Weight loss program offers all aspects of weight loss strategies including two Non-Surgical Weight Loss Programs: Medical Weight Management and our 24 Week Healthy Lifestyle Program:    Medical Weight Management: You will meet with a Medical Weight Management Provider, as well as a Registered Dietician. The program may include medication therapy, dietary education, recommended exercise and physical therapy programs,  monthly support group meetings, and possible psychological counseling. Follow up visits with the provider or dietician are scheduled based on your progress and needs.    24 Week Healthy Lifestyle Program: This unique program is designed to give you the support of weekly appointments and activities thru a 24-week period. It may include all of the components of the basic program (above), with the addition of 11 individual Health  Visits, 24-week access to the vivit website for over 700 online classes, and monthly support group meetings. This program has an out-of-pocket expense of $499 to cover the items that can not be billed to insurance (health coaches and vivit access), and is non-refundable/non-transferable (you may be able to use a Health Savings Account; ask your HSA provider). There may be an optional meal replacement plan prescribed as well.   Surgical management achieves meaningful long-term weight loss and improvement in health risks in most patients with more severe obesity.      Sleep Apnea Surgery:    Surgery for obstructive sleep apnea is considered generally only when other therapies fail to work. Surgery may be discussed with you if you are having a difficult time tolerating CPAP and or when there is an abnormal structure that requires surgical correction.  Nose and throat surgeries often enlarge the airway to prevent collapse.  Most of these surgeries create pain for 1-2 weeks and up to half of the most common surgeries are not effective throughout life.  You should carefully discuss the benefits and drawbacks to surgery with your sleep provider and surgeon to determine if it is the best solution for you.   More information  Surgery for ANUP is directed at areas that are responsible for narrowing or complete obstruction of the airway during sleep.  There are a wide range of procedures available to enlarge and/or stabilize the airway to prevent blockage of breathing in the three major  areas where it can occur: the palate, tongue, and nasal regions.  Successful surgical treatment depends on the accurate identification of the factors responsible for obstructive sleep apnea in each person.  A personalized approach is required because there is no single treatment that works well for everyone.  Because of anatomic variation, consultation with an examination by a sleep surgeon is a critical first step in determining what surgical options are best for each patient.  In some cases, examination during sedation may be recommended in order to guide the selection of procedures.  Patients will be counseled about risks and benefits as well as the typical recovery course after surgery. Surgery is typically not a cure for a person s ANUP.  However, surgery will often significantly improve one s ANUP severity (termed  success rate ).  Even in the absence of a cure, surgery will decrease the cardiovascular risk associated with OSA7; improve overall quality of life8 (sleepiness, functionality, sleep quality, etc).      Palate Procedures:  Patients with ANUP often have narrowing of their airway in the region of their tonsils and uvula.  The goals of palate procedures are to widen the airway in this region as well as to help the tissues resist collapse.  Modern palate procedure techniques focus on tissue conservation and soft tissue rearrangement, rather than tissue removal.  Often the uvula is preserved in this procedure. Residual sleep apnea is common in patient after pharyngoplasty with an average reduction in sleep apnea events of 33%2.      Tongue Procedures:  ExamWhile patients are awake, the muscles that surround the throat are active and keep this region open for breathing. These muscles relax during sleep, allowing the tongue and other structures to collapse and block breathing.  There are several different tongue procedures available.  Selection of a tongue base procedure depends on characteristics seen on  physical exam.  Generally, procedures are aimed at removing bulky tissues in this area or preventing the back of the tongue from falling back during sleep.  Success rates for tongue surgery range from 50-62%3.    Hypoglossal Nerve Stimulation:  Hypoglossal nerve stimulation has recently received approval from the [place] Food and Drug Administration for the treatment of obstructive sleep apnea.  This is based on research showing that the system was safe and effective in treating sleep apnea6.  Results showed that the median AHI score decreased 68%, from 29.3 to 9.0. This therapy uses an implant system that senses breathing patterns and delivers mild stimulation to airway muscles, which keeps the airway open during sleep.  The system consists of three fully implanted components: a small generator (similar in size to a pacemaker), a breathing sensor, and a stimulation lead.  Using a small handheld remote, a patient turns the therapy on before bed and off upon awakening.    Candidates for this device must be greater than 18 years of age, have moderate to severe obstructive sleep apnea with less than 25% central events  (AHI between 15-65), BMI less than 35, have tried CPAP/oral appliance for at least 8 weeks without success, and have appropriate upper airway anatomy (determined by a sleep endoscopy performed by Dr. Mark Easton or Dr. Sandeep Styles).    Nasal Procedures:  Nasal obstruction can interfere with nasal breathing during the day and night.  Studies have shown that relief of nasal obstruction can improve the ability of some patients to tolerate positive airway pressure therapy for obstructive sleep apnea1.  Treatment options include medications such as nasal saline, topical corticosteroid and antihistamine sprays, and oral medications such as antihistamines or decongestants. Non-surgical treatments can include external nasal dilators for selected patients. If these are not successful by themselves,  surgery can improve the nasal airway either alone or in combination with these other options.        Combination Procedures:  Combination of surgical procedures and other treatments may be recommended, particularly if patients have more than one area of narrowing or persistent positional disease.  The success rate of combination surgery ranges from 66-80%2,3.    References  Jammie PEREZ. The Role of the Nose in Snoring and Obstructive Sleep Apnoea: An Update.  Eur Arch Otorhinolaryngol. 2011; 268: 1365-73.   Quiana SM; Alli JA; Bernabe JR; Pallanch JF; Olayinka MB; Sacha SG; Ken JI. Surgical modifications of the upper airway for obstructive sleep apnea in adults: a systematic review and meta-analysis. SLEEP 2010;33(10):1497-0422. Sabrina CM. Hypopharyngeal surgery in obstructive sleep apnea: an evidence-based medicine review.  Arch Otolaryngol Head Neck Surg. 2006 Feb;132(2):206-13.  Jose YH1, Matthew Y, Jose CINDY. The efficacy of anatomically based multilevel surgery for obstructive sleep apnea. Otolaryngol Head Neck Surg. 2003 Oct;129(4):327-35.  Sabrina CM, Goldberg A. Hypopharyngeal Surgery in Obstructive Sleep Apnea: An Evidence-Based Medicine Review. Arch Otolaryngol Head Neck Surg. 2006 Feb;132(2):206-13.  Jonas MYERS et al. Upper-Airway Stimulation for Obstructive Sleep Apnea.  N Engl J Med. 2014 Jan 9;370(2):139-49.  Mayda Y et al. Increased Incidence of Cardiovascular Disease in Middle-aged Men with Obstructive Sleep Apnea. Am J Respir Crit Care Med; 2002 166: 159-165  Giles EM et al. Studying Life Effects and Effectiveness of Palatopharyngoplasty (SLEEP) study: Subjective Outcomes of Isolated Uvulopalatopharyngoplasty. Otolaryngol Head Neck Surg. 2011; 144: 623-631.        WHAT IF I ONLY HAVE SNORING?    Mandibular advancement devices, lateral sleep positioning, long-term weight loss and treatment of nasal allergies have been shown to improve snoring.  Exercising tongue muscles with a game  (https://[employer]oc Pub/us/mariela/soundly-reduce-snoring/ep5510997055) or stimulating the tongue during the day with a device (https://doi.org/10.3390/mzd35032691) have improved snoring in some individuals.  https://www.Inviragen.[employer] Pub/  https://www.sleepfoundation.org/best-anti-snoring-mouthpieces-and-mouthguards    Remember to Drive Safe... Drive Alive     Sleep health profoundly affects your health, mood, and your safety.  Thirty three percent of the population (one in three of us) is not getting enough sleep and many have a sleep disorder. Not getting enough sleep or having an untreated / undertreated sleep condition may make us sleepy without even knowing it. In fact, our driving could be dramatically impaired due to our sleep health. As your provider, here are some things I would like you to know about driving:     Here are some warning signs for impairment and dangerous drowsy driving:              -Having been awake more than 16 hours               -Looking tired               -Eyelid drooping              -Head nodding (it could be too late at this point)              -Driving for more than 30 minutes     Some things you could do to make the driving safer if you are experiencing some drowsiness:              -Stop driving and rest              -Call for transportation              -Make sure your sleep disorder is adequately treated     Some things that have been shown NOT to work when experiencing drowsiness while driving:              -Turning on the radio              -Opening windows              -Eating any  distracting  /  entertaining  foods (e.g., sunflower seeds, candy, or any other)              -Talking on the phone      Your decision may not only impact your life, but also the life of others. Please, remember to drive safe for yourself and all of us.

## 2024-11-11 NOTE — PROGRESS NOTES
[facility] SLEEP [address] [address]  Phone: [phone]    Patient:  Jacqui Grijalva, Date of birth 1965  Date of Visit:  11/11/2024  Referring Provider Krysten MICHAEL [facility] Sleep Health  Outpatient Sleep Medicine Consultation  November 11, 2024    Name: Jacqui Grijalva MRN# [id number]   Age: 59 year old YOB: 1965     Date of Consultation: November 11, 2024  Consultation is requested by: Krysten Velarde MD  [address] [place] [id number]  Primary care provider: Bijal Browne  Jacqui Grijalva is a 59 year old female              Assessment and Plan:   Jacqui Grijalva is a 59 year old female with history of class III obesity on a weight loss program with ongoing success, recently underwent a type III home sleep test which confirmed presence of moderate to severe obstructive sleep apnea.  She is here to discuss the results of her sleep study and discuss treatment options.   Sleep disordered breathing.  Moderate obstructive sleep apnea with a combined apnea hypopnea index of 23/h.  She does have significant positional variability with her supine apnea-hypopnea index at 53/h.  We discussed treatment options including positional and mandibular advancement therapies.  She is not a good candidate for it due to significant positional variability and the severity of her disease particularly in supine position.  We also discussed regarding hypoglossal nerve stimulation.  She is not a candidate for that therapy at this time due to increased BMI but with her ongoing weight loss can be considered in the future.  Continuous positive airway therapy was discussed in detail including interface types and overall treatment compliance.  In that would like to pursue CPAP therapy at this time.    Nocturia.  She is waking up between 3-5 times during the night due to urinary urgency.   This likely is related to untreated sleep disordered breathing.  Gastroesophageal reflux disease.  Interval improvement with ongoing weight loss and dietary modifications.      Comorbid Diagnoses:     Class III obesity.  Gastroesophageal reflux disease.    Summary Recommendations:    Start auto titrating CPAP therapy 5-15 cm of water.  Jacqui is a mouth breather and hence would benefit from a fullface interface.  Prescription for PAP supplies was also provided today.  She was congratulated on her ongoing weight loss and if she continues to lose significant weight reevaluation of sleep disordered breathing can be considered.    Summary Counseling:    Check out [email or web address]           History of Present Illness:     Jacqui Grijalva is a 59 year old female with history of chronic pain, now class II obesity with ongoing weight loss was recently diagnosed with moderate obstructive sleep apnea with a type III home sleep test in October 2024.  The combined apnea hypopnea index was 23 events per hour with significant positional variability, supine apnea-hypopnea index of 53 events per hour.  She is here today to discuss the findings of the sleep study in detail and also the treatment plan.    PREVIOUS SLEEP STUDIES: NOx T3 HST  Date: 10/8/2024  AHI: 23/h, supine AHI 53/h  Sleep Architecture: Analysis time 435 minutes    Sleep wake schedule:   Workday bedtime 10 PM  Awakening 6 AM with using alarm   Nonworkday bedtime 11 PM Awakening 7 AM without alarm   Awakenings 3-5 for 1-5 minutes either due pain in past or to use the bathroom/week  Naps: Denies     She does not feel rested in the morning.    [place] Sleepiness Scale: 5/24      KOMAL Total Score: (Patient-Rptd) 13         Medications:     Current Outpatient Medications   Medication Sig Dispense Refill    acetaminophen (TYLENOL) 500 MG tablet Take 500-1,000 mg by mouth every 6 hours as needed for mild pain      calcium carbonate-vitamin D (CALTRATE) 600-10  "MG-MCG per tablet Take 1 tablet by mouth daily.      ibuprofen (ADVIL/MOTRIN) 200 MG tablet Take 2-3 tablets by mouth every 6 hours as needed.      melatonin 5 MG tablet Take 5 mg by mouth nightly as needed for sleep      multivitamin w/minerals (THERA-VIT-M) tablet Take 1 tablet by mouth daily      triamcinolone (KENALOG) 0.1 % external ointment Apply topically 2 times daily. 160 g 5    valACYclovir (VALTREX) 1000 mg tablet Take 2 tablets (2,000 mg) by mouth 2 times daily 4 tablet 4     No current facility-administered medications for this visit.        Allergies   Allergen Reactions    Pcn [Penicillins]             Past Medical History:     Does not need 02 supplement at night   Past Medical History:   Diagnosis Date    Pain in joint, ankle and foot, left     traumatic injury in childhood    Post-traumatic osteoarthritis of right knee 07/11/2016             Past Surgical History:    No h/o  upper airway surgery  Past Surgical History:   Procedure Laterality Date    ARTHRODESIS ANKLE Left 12/27/2023    Procedure: FUSION, JOINT, ANKLE - LEFT;  Surgeon: Aman Rincon MD;  Location: [facility] OR    COLONOSCOPY N/A 7/23/2024    Procedure: Colonoscopy;  Surgeon: Arthur Lujan MD;  Location:  GI    right knee      Meniscus injection    STRIP VEIN Bilateral           Physical Examination:     Objective   Vitals: Pulse 98   Ht 1.803 m (5' 11\")   Wt 120.7 kg (266 lb)   LMP 08/01/2016 (Approximate)   SpO2 97%   BMI 37.10 kg/m  .  General: healthy, alert, and no distress  Psych: Alert and oriented times 3; coherent speech, normal   rate and volume, able to articulate logical thoughts, able   to abstract reason, no tangential thoughts, no hallucinations   or delusions  His affect is normal.    Copy to: Bijal Browne    Total of 41 minutes of time was spent with patient, this included the interview and exam, and review of the chart/labs/imaging/sleep study/PAP therapy data on 11/11/2024 . Greater than 50% of " which was spent counseling and coordinating care.   Syd Bashir MD 11/11/2024     [facility]   Fl[address] 1, [address]   [address] [place], [place] [address]. [place] [address]   Appointments: [phone]  Syd Bashir MD

## 2024-11-11 NOTE — PROGRESS NOTES
Preventive Care Visit  [facility]  KAYLIE Latham CNP, Family Medicine  Nov 11, 2024        ICD-10-CM    1. Routine history and physical examination of adult  Z00.00       2. Class 2 severe obesity due to excess calories with serious comorbidity and body mass index (BMI) of 36.0 to 36.9 in adult (H)  E66.812     E66.01     Z68.36       3. Osteoarthritis of multiple joints, unspecified osteoarthritis type  M15.9       4. Obstructive sleep apnea syndrome  G47.33       5. Gastroesophageal reflux disease without esophagitis  K21.9       6. Metabolic dysfunction-associated steatotic liver disease (MASLD)  K76.0       7. History of melanoma  Z85.820         Osteoarthritis improving with recent physical therapy and weight loss  She will soon start CPAP for her recently diagnosed ANUP  GERD well controlled with Pepcid OTC  MASLD likely improving with weight loss  History of Melanoma- getting regular checks with dermatology  Mood improving  Follow up 6-12 months    Subjective   Jacqui is a 59 year old, presenting for the following:  Physical        11/11/2024     3:11 PM   Additional Questions   Roomed by Opal CAMARA          HPI        11/11/2024   General Health   How would you rate your overall physical health? Good   Feel stress (tense, anxious, or unable to sleep) Only a little      (!) STRESS CONCERN      11/11/2024   Nutrition   Three or more servings of calcium each day? Yes   Diet: Carbohydrate counting    Other   If other, please elaborate: High fiber and high protein   How many servings of fruit and vegetables per day? (!) 2-3   How many sweetened beverages each day? 0-1       Multiple values from one day are sorted in reverse-chronological order         11/11/2024   Exercise   Days per week of moderate/strenous exercise 3 days   Average minutes spent exercising at this level 60 min            11/11/2024   Social Factors   Frequency of gathering with friends or relatives  Once a week   Worry food won't last until get money to buy more No   Food not last or not have enough money for food? No   Do you have housing? (Housing is defined as stable permanent housing and does not include staying ouside in a car, in a tent, in an abandoned building, in an overnight shelter, or couch-surfing.) No   Are you worried about losing your housing? No   Lack of transportation? No   Unable to get utilities (heat,electricity)? No   Want help with housing or utility concern? No      (!) HOUSING CONCERN PRESENT      11/11/2024   Fall Risk   Fallen 2 or more times in the past year? No    Trouble with walking or balance? No        Patient-reported          11/11/2024   Dental   Dentist two times every year? Yes                 Today's PHQ-2 Score:       10/31/2024    12:03 PM   PHQ-2 ( 1999 [employer])   Q1: Little interest or pleasure in doing things 0   Q2: Feeling down, depressed or hopeless 0   PHQ-2 Score 0         11/11/2024   Substance Use   Alcohol more than 3/day or more than 7/wk No   Do you use any other substances recreationally? No        Social History     Tobacco Use    Smoking status: Never    Smokeless tobacco: Never   Vaping Use    Vaping status: Never Used   Substance Use Topics    Alcohol use: Yes     Comment: 2 PER WEEK    Drug use: No           12/6/2023   LAST FHS-7 RESULTS   1st degree relative breast or ovarian cancer Yes    Any relative bilateral breast cancer No    Any male have breast cancer No    Any ONE woman have BOTH breast AND ovarian cancer Yes    Any woman with breast cancer before 50yrs No    2 or more relatives with breast AND/OR ovarian cancer Yes    2 or more relatives with breast AND/OR bowel cancer No        Patient-reported        Mammogram Screening - Mammogram every 1-2 years updated in Health Maintenance based on mutual decision making          11/11/2024   One time HIV Screening   Previous HIV test? Yes          11/11/2024   STI Screening   New sexual partner(s)  "since last STI/HIV test? No        History of abnormal Pap smear: No - age 30-64 HPV with reflex Pap every 5 years recommended        Latest Ref Rng & Units 11/7/2023     9:04 AM 8/19/2016    10:49 AM 8/19/2016    12:00 AM   PAP / HPV   PAP  Negative for Intraepithelial Lesion or Malignancy (NILM)      PAP (Historical)    NIL    HPV 16 DNA Negative Negative  Negative     HPV 18 DNA Negative Negative  Negative     Other HR HPV Negative Negative  Negative       ASCVD Risk   The 10-year ASCVD risk score (Tre PIERCE, et al., 2019) is: 2.1%    Values used to calculate the score:      Age: 59 years      Sex: Female      Is Non- : No      Diabetic: No      Tobacco smoker: No      Systolic Blood Pressure: 115 mmHg      Is BP treated: No      HDL Cholesterol: 65 mg/dL      Total Cholesterol: 225 mg/dL         Reviewed and updated as needed this visit by Provider       Med Hx   Fam Hx   Sexual Activity          Past Medical History:   Diagnosis Date    Pain in joint, ankle and foot, left     traumatic injury in childhood    Post-traumatic osteoarthritis of right knee 07/11/2016     Past Surgical History:   Procedure Laterality Date    ARTHRODESIS ANKLE Left 12/27/2023    Procedure: FUSION, JOINT, ANKLE - LEFT;  Surgeon: Aman Rincon MD;  [facility]    COLONOSCOPY N/A 7/23/2024    Procedure: Colonoscopy;  Surgeon: Arthur Lujan MD;  Location:  GI    right knee      Meniscus injection    STRIP VEIN Bilateral          Review of Systems  Constitutional, HEENT, cardiovascular, pulmonary, GI, , musculoskeletal, neuro, skin, endocrine and psych systems are negative, except as otherwise noted.     Objective    Exam  /82 (BP Location: Right arm, Patient Position: Sitting, Cuff Size: Adult Large)   Pulse 92   Temp 98  F (36.7  C) (Temporal)   Resp 19   Ht 1.805 m (5' 11.06\")   Wt 120 kg (264 lb 8 oz)   LMP 08/01/2016 (Approximate)   SpO2 99%   BMI 36.82 kg/m   " "  Estimated body mass index is 36.82 kg/m  as calculated from the following:    Height as of this encounter: 1.805 m (5' 11.06\").    Weight as of this encounter: 120 kg (264 lb 8 oz).    Physical Exam  GENERAL: alert and no distress  EYES: Eyes grossly normal to inspection, PERRL and conjunctivae and sclerae normal  HENT: ear canals and TM's normal, nose and mouth without ulcers or lesions  NECK: no adenopathy, no asymmetry, masses, or scars  RESP: lungs clear to auscultation - no rales, rhonchi or wheezes  CV: regular rate and rhythm, normal S1 S2, no S3 or S4, no murmur, click or rub, no peripheral edema  ABDOMEN: soft, nontender, no hepatosplenomegaly, no masses and bowel sounds normal  MS: no gross musculoskeletal defects noted, no edema  NEURO: Normal strength and tone, mentation intact and speech normal  PSYCH: mentation appears normal, affect normal/bright        Signed Electronically by: KAYLIE Latham CNP    "

## 2024-11-12 PROBLEM — E66.813 CLASS 3 SEVERE OBESITY WITH SERIOUS COMORBIDITY AND BODY MASS INDEX (BMI) OF 40.0 TO 44.9 IN ADULT, UNSPECIFIED OBESITY TYPE (H): Status: RESOLVED | Noted: 2024-06-04 | Resolved: 2024-11-12

## 2024-11-12 PROBLEM — E66.01 CLASS 2 SEVERE OBESITY DUE TO EXCESS CALORIES WITH SERIOUS COMORBIDITY IN ADULT (H): Status: ACTIVE | Noted: 2024-11-12

## 2024-11-12 PROBLEM — E66.812 CLASS 2 SEVERE OBESITY DUE TO EXCESS CALORIES WITH SERIOUS COMORBIDITY IN ADULT (H): Status: ACTIVE | Noted: 2024-11-12

## 2024-11-12 PROBLEM — E66.01 CLASS 3 SEVERE OBESITY WITH SERIOUS COMORBIDITY AND BODY MASS INDEX (BMI) OF 40.0 TO 44.9 IN ADULT, UNSPECIFIED OBESITY TYPE (H): Status: RESOLVED | Noted: 2024-06-04 | Resolved: 2024-11-12

## 2024-11-12 PROBLEM — K76.0 METABOLIC DYSFUNCTION-ASSOCIATED STEATOTIC LIVER DISEASE (MASLD): Status: ACTIVE | Noted: 2024-11-12

## 2024-11-12 PROBLEM — G47.33 OBSTRUCTIVE SLEEP APNEA SYNDROME: Status: ACTIVE | Noted: 2024-11-12

## 2024-11-12 LAB
ALBUMIN SERPL BCG-MCNC: 4.3 G/DL (ref 3.5–5.2)
ALP SERPL-CCNC: 71 U/L (ref 40–150)
ALT SERPL W P-5'-P-CCNC: 14 U/L (ref 0–50)
ANION GAP SERPL CALCULATED.3IONS-SCNC: 11 MMOL/L (ref 7–15)
AST SERPL W P-5'-P-CCNC: 19 U/L (ref 0–45)
BILIRUB SERPL-MCNC: 0.3 MG/DL
BUN SERPL-MCNC: 14.9 MG/DL (ref 6–20)
CALCIUM SERPL-MCNC: 9.5 MG/DL (ref 8.8–10.4)
CHLORIDE SERPL-SCNC: 104 MMOL/L (ref 98–107)
CHOLEST SERPL-MCNC: 208 MG/DL
CREAT SERPL-MCNC: 0.99 MG/DL (ref 0.51–0.95)
EGFRCR SERPLBLD CKD-EPI 2021: 66 ML/MIN/1.73M2
FASTING STATUS PATIENT QL REPORTED: NO
FASTING STATUS PATIENT QL REPORTED: NO
GLUCOSE SERPL-MCNC: 95 MG/DL (ref 70–99)
HCO3 SERPL-SCNC: 25 MMOL/L (ref 22–29)
HDLC SERPL-MCNC: 53 MG/DL
LDLC SERPL CALC-MCNC: 133 MG/DL
NONHDLC SERPL-MCNC: 155 MG/DL
POTASSIUM SERPL-SCNC: 5 MMOL/L (ref 3.4–5.3)
PROT SERPL-MCNC: 7 G/DL (ref 6.4–8.3)
PTH-INTACT SERPL-MCNC: 43 PG/ML (ref 15–65)
SODIUM SERPL-SCNC: 140 MMOL/L (ref 135–145)
TRIGL SERPL-MCNC: 109 MG/DL
VIT D+METAB SERPL-MCNC: 31 NG/ML (ref 20–50)

## 2024-11-15 ENCOUNTER — ALLIED HEALTH/NURSE VISIT (OUTPATIENT)
Dept: FAMILY MEDICINE | Facility: CLINIC | Age: 59
End: 2024-11-15
Payer: COMMERCIAL

## 2024-11-15 DIAGNOSIS — Z11.1 ENCOUNTER FOR TUBERCULIN SKIN TEST: Primary | ICD-10-CM

## 2024-11-15 PROCEDURE — 86580 TB INTRADERMAL TEST: CPT

## 2024-11-15 PROCEDURE — 99207 PR NO CHARGE NURSE ONLY: CPT

## 2024-11-15 NOTE — PROGRESS NOTES
"Patient is here today for a Mantoux (TST) test placement.    Is there a current order in the chart? No. Placed order according to standing order (reference the \"Skin Test- Tuberculosis Screening- Ambulatory Care\" standing order in Policy Tech). Review the Inclusion and Exclusion Criteria.    PLACED AT 9:43AM IN LEFT FOREARM EXPLAINED TO PATIENT TO RETURN BY THIS TIME MONDAY PATIENT UNDERSTANDS HAS APPOINMENT SCHEDULE MONDAY AT 8:30AM.    Inclusion Criteria  Pre-employment screening for healthcare workers and correctional facility staff - Administer two-step TST. Patient to return for second test in 1-3 weeks after first test is read.     Exclusion Criteria  None - Place order for Mantoux (TST) test per standing order.    Reason for Mantoux (TST) in patient's own words: Provider who needs it for pre employment.    Patient needs form signed? No - form not needed per patient.    Instructed patient to wait for 15 minutes post injection and to report any reactions immediately to staff.    Told patient to return to clinic in 48-72 hours to have Mantoux (TST) read.          "

## 2024-11-18 ENCOUNTER — ALLIED HEALTH/NURSE VISIT (OUTPATIENT)
Dept: FAMILY MEDICINE | Facility: CLINIC | Age: 59
End: 2024-11-18
Payer: COMMERCIAL

## 2024-11-18 DIAGNOSIS — Z11.1 ENCOUNTER FOR TUBERCULIN SKIN TEST: Primary | ICD-10-CM

## 2024-11-18 LAB
PPDINDURATION: 0 MM (ref 0–4.99)
PPDREDNESS: NORMAL

## 2024-11-18 PROCEDURE — 99207 PR NO CHARGE NURSE ONLY: CPT

## 2024-11-18 NOTE — PROGRESS NOTES
Patient is here today for a Mantoux (TST) test results.    Did patient return to clinic 48-72 hours from Mantoux (TST) placement: Yes -     PPD Induration   Date Value Ref Range Status   11/18/2024 0 0 - 4.99 mm Final     PPD Redness   Date Value Ref Range Status   11/18/2024 Not Present  Final       Induration Size? Induration <5mm - Enter results in Enter/Edit Activity. Route results to ordering provider.     Patient needs form signed? No    Patient reports having previously had the BCG Vaccine: No    Does patient need a two step? No--This was the final read of two-step TB test.     Thank you,  Nader PINZON  [facility]

## 2024-11-19 ENCOUNTER — MYC REFILL (OUTPATIENT)
Dept: FAMILY MEDICINE | Facility: CLINIC | Age: 59
End: 2024-11-19
Payer: COMMERCIAL

## 2024-11-19 DIAGNOSIS — E66.01 CLASS 2 SEVERE OBESITY DUE TO EXCESS CALORIES WITH SERIOUS COMORBIDITY AND BODY MASS INDEX (BMI) OF 36.0 TO 36.9 IN ADULT (H): Primary | ICD-10-CM

## 2024-11-19 DIAGNOSIS — E66.812 CLASS 2 SEVERE OBESITY DUE TO EXCESS CALORIES WITH SERIOUS COMORBIDITY AND BODY MASS INDEX (BMI) OF 36.0 TO 36.9 IN ADULT (H): Primary | ICD-10-CM

## 2024-11-20 ENCOUNTER — MYC MEDICAL ADVICE (OUTPATIENT)
Dept: PHARMACY | Facility: CLINIC | Age: 59
End: 2024-11-20
Payer: COMMERCIAL

## 2024-11-20 DIAGNOSIS — E66.01 CLASS 2 SEVERE OBESITY DUE TO EXCESS CALORIES WITH SERIOUS COMORBIDITY AND BODY MASS INDEX (BMI) OF 36.0 TO 36.9 IN ADULT (H): ICD-10-CM

## 2024-11-20 DIAGNOSIS — E66.812 CLASS 2 SEVERE OBESITY DUE TO EXCESS CALORIES WITH SERIOUS COMORBIDITY AND BODY MASS INDEX (BMI) OF 36.0 TO 36.9 IN ADULT (H): ICD-10-CM

## 2024-11-21 NOTE — TELEPHONE ENCOUNTER
Primary care provider wishing to increase Wegovy to 1 mg weekly.     increase Wegovy to 1 mg weekly. CPA with Yanira Nails, NP.     Yanira Nails, CNP as one of the credentialed prescriber of the [facility] Weight Mgmt Program    Lauren Bloch, PharmD, BCACP   Medication Therapy Management Pharmacist   [facility]        Signed Prescriptions:                        Disp   Refills    Semaglutide-Weight Management (WEGOVY) 1 M*2 mL   0        Sig: Inject 1 mg subcutaneously once a week.  Authorizing Provider: YANIRA NAILS  Ordering User: BLOCH, LAUREN T

## 2024-11-25 ENCOUNTER — THERAPY VISIT (OUTPATIENT)
Dept: PHYSICAL THERAPY | Facility: CLINIC | Age: 59
End: 2024-11-25
Payer: COMMERCIAL

## 2024-11-25 ENCOUNTER — VIRTUAL VISIT (OUTPATIENT)
Dept: ENDOCRINOLOGY | Facility: CLINIC | Age: 59
End: 2024-11-25
Payer: COMMERCIAL

## 2024-11-25 VITALS — HEIGHT: 71 IN | WEIGHT: 262 LBS | BODY MASS INDEX: 36.68 KG/M2

## 2024-11-25 DIAGNOSIS — E66.01 CLASS 2 SEVERE OBESITY WITH SERIOUS COMORBIDITY AND BODY MASS INDEX (BMI) OF 36.0 TO 36.9 IN ADULT, UNSPECIFIED OBESITY TYPE (H): Primary | ICD-10-CM

## 2024-11-25 DIAGNOSIS — E66.812 CLASS 2 SEVERE OBESITY WITH SERIOUS COMORBIDITY AND BODY MASS INDEX (BMI) OF 36.0 TO 36.9 IN ADULT, UNSPECIFIED OBESITY TYPE (H): Primary | ICD-10-CM

## 2024-11-25 DIAGNOSIS — M17.31 POST-TRAUMATIC OSTEOARTHRITIS OF RIGHT KNEE: Primary | ICD-10-CM

## 2024-11-25 PROCEDURE — 99215 OFFICE O/P EST HI 40 MIN: CPT | Mod: 95

## 2024-11-25 PROCEDURE — G2211 COMPLEX E/M VISIT ADD ON: HCPCS | Mod: 95

## 2024-11-25 PROCEDURE — 97112 NEUROMUSCULAR REEDUCATION: CPT | Mod: GP | Performed by: PHYSICAL THERAPIST

## 2024-11-25 PROCEDURE — 97110 THERAPEUTIC EXERCISES: CPT | Mod: GP | Performed by: PHYSICAL THERAPIST

## 2024-11-25 NOTE — PROGRESS NOTES
"Virtual Visit Details    Type of service:  Video Visit     Originating Location (pt. Location): {video visit patient location:075094::\"Home\"}  {PROVIDER LOCATION On-site should be selected for visits conducted from your clinic location or adjoining [facility] hospital, academic office, or other nearby [facility] building. Off-site should be selected for all other provider locations, including home:539625}  Distant Location (provider location):  {virtual location provider:728364}  Platform used for Video Visit: {Virtual Visit Platforms:301472::\"[employer]\"}    "

## 2024-11-25 NOTE — PROGRESS NOTES
Virtual Visit Details    Type of service:  Video Visit     Originating Location (pt. Location): Home    Distant Location (provider location):  On-site  Platform used for Video Visit: [facility] Medical Weight Management Note     Jacqui Grijalva  MRN:  [id number]  :  1965  MERCEDES:  2024    Dear KAYLIE Latham CNP,    I had the pleasure of seeing your patient Jacqui Grijalva. She is a 59 year old female who I am continuing to see for treatment of obesity related to:        2024     3:41 PM   --   I have the following health issues associated with obesity Sleep Apnea    GERD (Reflux)    Stress Incontinence    Osteoarthritis (joint disease)   I have the following symptoms associated with obesity Knee Pain    Fatigue       Assessment & Plan   Problem List Items Addressed This Visit       Class 2 severe obesity with serious comorbidity in adult (H) - Primary      Increase Wegovy 1.0mg once weekly. Once insurance no longer covers, will transition to compounded semaglutide  Try gas-x to help with gas side effects   Constipation - can continue metamucil, can also try miralax 1 cap daily to see if a better fit. .   Will reach out in 3 weeks about how 1.0mg dose is going to discuss what dose to send in December for last fill before insurance no longer covers    Wendy Agosto in 3 months     INTERVAL HISTORY:  [facility] on [facility]. Referrals placed for health , health psychology and get moving program. Decided to hold off on AOMs. Really focused on life style changes - lost 17lbs. Continued to have increase hunger and weight stall.   Last seen by Carole Guo PA-C on 9/3/2024 and started on Wegovy through [facility] pharmacist.   [employer] employee. Starting BMI >40      Has continued to see Meri Greenberg and has found it to be really helpful - at first was seeing her every 3 weeks. Really focuses on the 4 pillars of health. Did not see health psych as she felt like Meri filled that roll.   Has been to  one support group. Unsure if finds this to be helpful.     Overall is really proud and happy wither results. She was able to lose weight with life style changes, and with weight stall added Wegovy.     Anti-obesity medication history    Current:   Wegovy 1.0mg - has not yet gotten it due to shortages. Has been shipped. Last injection on the 0.5mg was 1 week ago.   Side effects of mild nausea, but is tolerable. Has constipation, but is well controlled on metamucil. Stools are soft. Has some gas. Has been helpful with weight loss.       Recent diet changes: Has been tracking food every day since June 1st. This has been really helpful to keep her accountable. Average daily - Protein 75g, Fiber 18g, 7259-1371. Drinking lots of water - doesn't need to track with how much she eats. Only drinking water. Really focusing on increase protein and fiber. Less snacking.     Recent exercise/activity changes: ankle fusion around 1 year ago. Was doing ankle PT after our first visit in May. Started to increase walking, biking, and golfing. [place] the [place] and walk a 9 hole golf course. Has recently added exercise classes - joined class pass - enjoys cardio dance classes. Has tried a bar class, jazzercise, yoga. Goal of 200min of activity every week. Has a gym at [employer].     Recent stressors: Minimal stress.     Recent sleep changes: Started CPAP on Friday - is still adjusting. But overall has gone well.     Vitamins/Labs: Taking MV, Calcium 1000mg, Magnesium citrate 250mg daily, collagen. Reviewed labs. No concerns.     CURRENT WEIGHT:   262 lbs 0 oz    Initial Weight (lbs): 294 lbs  Last Visits Weight: 119.7 kg (264 lb)  Cumulative weight loss (lbs): 32  Weight Loss Percentage: 10.88%    Wt Readings from Last 5 Encounters:   11/25/24 118.8 kg (262 lb)   11/11/24 120 kg (264 lb 8 oz)   11/11/24 120.7 kg (266 lb)   10/31/24 121.6 kg (268 lb)   09/19/24 124.7 kg (275 lb)             11/25/2024     2:20 PM   Changes and  "Difficulties   I have made the following changes to my diet since my last visit: Same as before   With regards to my diet, I am still struggling with: Hunger   I have made the following changes to my activity/exercise since my last visit: Added exercise classes. Seeing PT   With regards to my activity/exercise, I am still struggling with: Motivation         MEDICATIONS:   Current Outpatient Medications   Medication Sig Dispense Refill    acetaminophen (TYLENOL) 500 MG tablet Take 500-1,000 mg by mouth every 6 hours as needed for mild pain      calcium carbonate-vitamin D (CALTRATE) 600-10 MG-MCG per tablet Take 1 tablet by mouth daily.      ibuprofen (ADVIL/MOTRIN) 200 MG tablet Take 2-3 tablets by mouth every 6 hours as needed.      melatonin 5 MG tablet Take 5 mg by mouth nightly as needed for sleep      multivitamin w/minerals (THERA-VIT-M) tablet Take 1 tablet by mouth daily      Semaglutide-Weight Management (WEGOVY) 1 MG/0.5ML pen Inject 1 mg subcutaneously once a week. 2 mL 0    triamcinolone (KENALOG) 0.1 % external ointment Apply topically 2 times daily. 160 g 5    valACYclovir (VALTREX) 1000 mg tablet Take 2 tablets (2,000 mg) by mouth 2 times daily 4 tablet 4           11/25/2024     2:20 PM   Weight Loss Medication History Reviewed With Patient   Which weight loss medications are you currently taking on a regular basis? Wegovy   Are you having any side effects from the weight loss medication that we have prescribed you? Yes   If you are having side effects please describe: Constipation, gas           5/30/2024    12:43 PM   PRITI Score (Last Two)   PRITI Raw Score 32   Activation Score 62.6   PRITI Level 3         PHYSICAL EXAM:  Objective    Ht 1.805 m (5' 11.06\")   Wt 118.8 kg (262 lb)   LMP 08/01/2016 (Approximate)   BMI 36.48 kg/m             Vitals:  No vitals were obtained today due to virtual visit.    GENERAL: alert and no distress  EYES: Eyes grossly normal to inspection.  No discharge or erythema, " or obvious scleral/conjunctival abnormalities.  RESP: No audible wheeze, cough, or visible cyanosis.    SKIN: Visible skin clear. No significant rash, abnormal pigmentation or lesions.  NEURO: Cranial nerves grossly intact.  Mentation and speech appropriate for age.  PSYCH: Appropriate affect, tone, and pace of words        Sincerely,    Wendy Gonzalez PA-C      47 minutes spent by me on the date of the encounter doing chart review, history and exam, documentation and further activities per the note    The longitudinal plan of care for the diagnosis(es)/condition(s) as documented were addressed during this visit. Due to the added complexity in care, I will continue to support Jacqui in the subsequent management and with ongoing continuity of care.

## 2024-11-25 NOTE — NURSING NOTE
Is the patient currently in the state of [place]? YES    Location: home    Visit mode:VIDEO    If the visit is dropped, the patient can be reconnected by: VIDEO VISIT: Text to cell phone:   Telephone Information:   Mobile [phone]    and VIDEO VISIT: Send to e-mail at: [email or web address]    Will anyone else be joining the visit? NO  (If patient encounters technical issues they should call [phone])    Are changes needed to the allergy or medication list? No    Are refills needed on medications prescribed by this physician? NO    Reason for visit: GITA Armenta, Virtual Visit Facilitator    QNR Status: completed

## 2024-11-25 NOTE — LETTER
2024       RE: Jacqui Grijalva  [address]  [place] [address]     Dear Colleague,    Thank you for referring your patient, Jacqui Grijalva, to the [facility] WEIGHT MANAGEMENT [facility]INIC [place] at [facility]. Please see a copy of my visit note below.    Virtual Visit Details    Type of service:  Video Visit     Originating Location (pt. Location): Home    Distant Location (provider location):  On-site  Platform used for Video Visit: [facility] Weight Management Note     Jacqui Grjialva  MRN:  [id number]  :  1965  MERCEDES:  2024    Dear Bijal Browne, KAYLIE FLETCHER,    I had the pleasure of seeing your patient Jacqui Grijalva. She is a 59 year old female who I am continuing to see for treatment of obesity related to:        2024     3:41 PM   --   I have the following health issues associated with obesity Sleep Apnea    GERD (Reflux)    Stress Incontinence    Osteoarthritis (joint disease)   I have the following symptoms associated with obesity Knee Pain    Fatigue       Assessment & Plan  Problem List Items Addressed This Visit       Class 2 severe obesity with serious comorbidity in adult (H) - Primary      Increase Wegovy 1.0mg once weekly. Once insurance no longer covers, will transition to compounded semaglutide  Try gas-x to help with gas side effects   Constipation - can continue metamucil, can also try miralax 1 cap daily to see if a better fit. .   Will reach out in 3 weeks about how 1.0mg dose is going to discuss what dose to send in December for last fill before insurance no longer covers    Wendy Agosto in 3 months     INTERVAL HISTORY:  [facility] on 2024. Referrals placed for health , health psychology and get moving program. Decided to hold off on AOMs. Really focused on life style changes - lost 17lbs. Continued to have increase hunger and weight stall.   Last seen by Carole  CINTIA Guo on 9/3/2024 and started on Wegovy through [facility] pharmacist.   [employer] employee. Starting BMI >40      Has continued to see Meri Greenberg and has found it to be really helpful - at first was seeing her every 3 weeks. Really focuses on the 4 pillars of health. Did not see health psych as she felt like Meri filled that roll.   Has been to one support group. Unsure if finds this to be helpful.     Overall is really proud and happy wither results. She was able to lose weight with life style changes, and with weight stall added Wegovy.     Anti-obesity medication history    Current:   Wegovy 1.0mg - has not yet gotten it due to shortages. Has been shipped. Last injection on the 0.5mg was 1 week ago.   Side effects of mild nausea, but is tolerable. Has constipation, but is well controlled on metamucil. Stools are soft. Has some gas. Has been helpful with weight loss.       Recent diet changes: Has been tracking food every day since June 1st. This has been really helpful to keep her accountable. Average daily - Protein 75g, Fiber 18g, [id number]. Drinking lots of water - doesn't need to track with how much she eats. Only drinking water. Really focusing on increase protein and fiber. Less snacking.     Recent exercise/activity changes: ankle fusion around 1 year ago. Was doing ankle PT after our first visit in May. Started to increase walking, biking, and golfing. Was able to walk the state fair and walk a 9 hole golf course. Has recently added exercise classes - joined class pass - enjoys cardio dance classes. Has tried a bar class, jazzercise, yoga. Goal of 200min of activity every week. Has a gym at [employer]     Recent stressors: Minimal stress.     Recent sleep changes: Started CPAP on Friday - is still adjusting. But overall has gone well.     Vitamins/Labs: Taking MV, Calcium 1000mg, Magnesium citrate 250mg daily, collagen. Reviewed labs. No concerns.     CURRENT WEIGHT:   262 lbs 0 oz    Initial Weight (lbs): 294  lbs  Last Visits Weight: 119.7 kg (264 lb)  Cumulative weight loss (lbs): 32  Weight Loss Percentage: 10.88%    Wt Readings from Last 5 Encounters:   11/25/24 118.8 kg (262 lb)   11/11/24 120 kg (264 lb 8 oz)   11/11/24 120.7 kg (266 lb)   10/31/24 121.6 kg (268 lb)   09/19/24 124.7 kg (275 lb)             11/25/2024     2:20 PM   Changes and Difficulties   I have made the following changes to my diet since my last visit: Same as before   With regards to my diet, I am still struggling with: Hunger   I have made the following changes to my activity/exercise since my last visit: Added exercise classes. Seeing PT   With regards to my activity/exercise, I am still struggling with: Motivation         MEDICATIONS:   Current Outpatient Medications   Medication Sig Dispense Refill     acetaminophen (TYLENOL) 500 MG tablet Take 500-1,000 mg by mouth every 6 hours as needed for mild pain       calcium carbonate-vitamin D (CALTRATE) 600-10 MG-MCG per tablet Take 1 tablet by mouth daily.       ibuprofen (ADVIL/MOTRIN) 200 MG tablet Take 2-3 tablets by mouth every 6 hours as needed.       melatonin 5 MG tablet Take 5 mg by mouth nightly as needed for sleep       multivitamin w/minerals (THERA-VIT-M) tablet Take 1 tablet by mouth daily       Semaglutide-Weight Management (WEGOVY) 1 MG/0.5ML pen Inject 1 mg subcutaneously once a week. 2 mL 0     triamcinolone (KENALOG) 0.1 % external ointment Apply topically 2 times daily. 160 g 5     valACYclovir (VALTREX) 1000 mg tablet Take 2 tablets (2,000 mg) by mouth 2 times daily 4 tablet 4           11/25/2024     2:20 PM   Weight Loss Medication History Reviewed With Patient   Which weight loss medications are you currently taking on a regular basis? Wegovy   Are you having any side effects from the weight loss medication that we have prescribed you? Yes   If you are having side effects please describe: Constipation, gas           5/30/2024    12:43 PM   PRITI Score (Last Two)   PRITI Raw  "Score 32   Activation Score 62.6   PRITI Level 3         PHYSICAL EXAM:  Objective   Ht 1.805 m (5' 11.06\")   Wt 118.8 kg (262 lb)   LMP 08/01/2016 (Approximate)   BMI 36.48 kg/m             Vitals:  No vitals were obtained today due to virtual visit.    GENERAL: alert and no distress  EYES: Eyes grossly normal to inspection.  No discharge or erythema, or obvious scleral/conjunctival abnormalities.  RESP: No audible wheeze, cough, or visible cyanosis.    SKIN: Visible skin clear. No significant rash, abnormal pigmentation or lesions.  NEURO: Cranial nerves grossly intact.  Mentation and speech appropriate for age.  PSYCH: Appropriate affect, tone, and pace of words        Sincerely,    Wendy Gonzalez PA-C      47 minutes spent by me on the date of the encounter doing chart review, history and exam, documentation and further activities per the note    The longitudinal plan of care for the diagnosis(es)/condition(s) as documented were addressed during this visit. Due to the added complexity in care, I will continue to support Jacqui in the subsequent management and with ongoing continuity of care.      Again, thank you for allowing me to participate in the care of your patient.      Sincerely,    Wendy Gonzalez PA-C    "

## 2024-11-26 ENCOUNTER — DOCUMENTATION ONLY (OUTPATIENT)
Dept: SLEEP MEDICINE | Facility: CLINIC | Age: 59
End: 2024-11-26
Payer: COMMERCIAL

## 2024-11-26 NOTE — PATIENT INSTRUCTIONS
"Rinku Osman,   Thank you for allowing us the privilege of caring for you. We hope we provided you with the excellent service you deserve.   Please let us know if there is anything else we can do for you so that we can be sure you are completely satisfied with your care experience.    To ensure the quality of our services you may be receiving a patient satisfaction survey from an independent patient satisfaction monitoring company.    The greatest compliment you can give is a \"Likely to Recommend\"    Your visit was with Wendy Agosto CINTIA Gonzalez today.    Instructions per today's visit:     Increase Wegovy 1.0mg once weekly. Once insurance no longer covers, will transition to compounded semaglutide  Try gas-x to help with gas side effects   Constipation - can continue metamucil, can also try miralax 1 cap daily to see if a better fit. .   Will reach out in 3 weeks about how 1.0mg dose is going to discuss what dose to send in December for last fill before insurance no longer covers    Wendy Agosto in 3 months     ___________________________________________________________________________  Important contact and scheduling information:  Please call our contact center at [phone] to schedule your next appointments.  For any nursing questions or concerns call Dione De La Torre LPN at [phone] or Estephania Gill RN at [phone]  Please call during clinic hours Monday through Friday 8:00a - 4:00p if you have questions or you can contact us via [employer] at anytime and we will reply during clinic hours.    Lab results will be communicated through My Chart or letter (if My Chart not used). Please call the clinic if you have not received communication after 1 week or if you have any questions.?  Clinic Fax: [phone]  __________________________________________________________________________    If labs were ordered today:    Please make an appointment to have them drawn at your convenience.     To schedule the Lab Appointment " "using [employer]:  Select \"Schedule an Appointment\"  Select \"Lab Only\"  For \"A couple of questions\", select \"Other\"  For \"Which locations work for you?, select the location and set up the appointment    To schedule by phone call [phone] to schedule a lab only appointment at any [facility] lab.  ___________________________________________________________________________  Work with A Health !  Virtual Sessions are Available through [facility] Weight Management Clinics    To learn more, call to schedule a free, Health  Q&A appointment: [phone]     What is Health Coaching?  Do you know what you are supposed to do, but you just aren't doing it?  Then, HEALTH COACHING may help you!   Get unstuck and move forward with the support of a professionally trained NBC-HWC (National Board-Certified Health and ) who uses evidence-based approaches to help you move forward with healthy lifestyle changes in the areas of weight loss, stress management and overall well-being.    Health Coaches help you identify goals that will work best for you. Health Coaches provide support and encouragement with overcoming barriers and help you to find inspiration and motivation to lead a healthy lifestyle.    Option one:  Health Coaching 3-Pack; Three, 30-minute Health Coaching Visits, for $99  Visits are done virtually (phone or video)  This is a self pay service; we do not accept insurance for lauren coaching.    Option two:   The 24 week Plan; 11 Health Coaching Visits, and a 7 months subscription to [employer]-- on-demand fitness, nutrition and mindfulness classes, for $499 (employee discounts may be available). Participants will also meet regularly with a weight management Medical Provider and a Registered/Licensed Dietician.  This is a self-pay service; we do not accept insurance for health coaching.    To Schedule a free Health  Q&A appointment to learn more,  call " "[phone].  ____________________________________________________________________  [facility]  Healthy Lifestyle Group    Healthy Lifestyle Group  This is a 60 minute virtual coaching group for those who want to lead a healthier lifestyle. Come together to set goals and overcome barriers in a supportive group environment. We will address the four pillars of health--nutrition, exercise, sleep and emotional well-being.  This group is highly recommended for those who are participating in the 24 week Healthy Lifestyle Plan and our Health Coaching sessions.    WHEN: This group meets the first Friday of the month, 12:30 PM - 1:30 PM online, via a zoom meeting.      FACILITATOR: Led by National Board Certified Health and , Meri Greenberg [facility].    TO REGISTER: Please call the Call Center at [phone] to register. You will get an appointment to attend in [facility]. Fifteen minutes prior to the meeting, complete the e-check in and you will get the link to join the meeting.  There is no charge to attend this group and space is limited.      2023 and 2024 Meeting Topics and Dates:    November 3: Introduction to Mindfulness (Learn simple and effective mindfulness practices and how it can benefit you)    December 8: Let's Talk (guided discussion on our wins and challenges)    January 5: New Years Vision: Manifest your Best 2024! (Guided imagery,  journaling and discussion)    February 2: Let's Talk    March 1: 10 Percent Happier by Vel Hagan (Book Bites; a guided discussion on the nuggets of wisdom from favorite wellness books; no need to read the book but highly encouraged)    April 5: Let's Talk    May 3: \"Essentialism; The Disciplined Pursuit of Less by Giovanny John (book bites discussion)    June 7: Let's Talk    July 5: NO MEETING, off for the 4th of July Holiday    August 2: The Blue Zones, Secrets for Living a Longer Life by Vel Florian (book bites " discussion)      If you would like bariatric surgery specific support group info please let your care team know.         Thank you,   FERNANDA Jenkins Comprehensive Weight Management Team

## 2024-11-26 NOTE — PROGRESS NOTES
3 day Sleep therapy management telephone visit    Diagnostic AHI:    HST: 23        LEFT VOICE MESSAGE FOR PATIENT TO RETURN CALL      Order settings:  CPAP MIN CPAP MAX   5 cm H2O 15 cm H2O         Device settings:  CPAP MIN CPAP MAX EPR RESMED SOFT RESPONSE SETTING   5.0 cm  H20 15.0 cm  H20 TWO OFF         Patient has the following upcoming sleep appts:  Future Sleep Appointments         Provider Department    2/19/2025 11:00 AM (Arrive by 10:45 AM) Krysten Velarde MD [facility]            Replacement device: No  STM ordered by provider: Yes     Total time spent on accessing and  interpreting remote patient PAP therapy data  10 minutes    Total time spent counseling, coaching  and reviewing PAP therapy data with patient  0 minutes

## 2024-11-27 ENCOUNTER — TELEPHONE (OUTPATIENT)
Dept: GASTROENTEROLOGY | Facility: CLINIC | Age: 59
End: 2024-11-27

## 2024-12-04 ENCOUNTER — DOCUMENTATION ONLY (OUTPATIENT)
Dept: SLEEP MEDICINE | Facility: CLINIC | Age: 59
End: 2024-12-04
Payer: COMMERCIAL

## 2024-12-04 NOTE — PROGRESS NOTES
Pt reports doing fairly well with CPAP. She has some air leaks which we discussed. Could be mouth breathing or mask shifting. She had questions about her leak- advised not to worry about it too much as she is comfortable and her lima is well controlled. She had questions about her my air score which were answered.

## 2024-12-18 ENCOUNTER — THERAPY VISIT (OUTPATIENT)
Dept: PHYSICAL THERAPY | Facility: CLINIC | Age: 59
End: 2024-12-18
Payer: COMMERCIAL

## 2024-12-18 DIAGNOSIS — M17.31 POST-TRAUMATIC OSTEOARTHRITIS OF RIGHT KNEE: Primary | ICD-10-CM

## 2024-12-18 PROCEDURE — 97110 THERAPEUTIC EXERCISES: CPT | Mod: GP | Performed by: PHYSICAL THERAPIST

## 2024-12-18 PROCEDURE — 97112 NEUROMUSCULAR REEDUCATION: CPT | Mod: GP | Performed by: PHYSICAL THERAPIST

## 2024-12-18 NOTE — PROGRESS NOTES
DISCHARGE  Reason for Discharge: Patient has met all goals.    Equipment Issued: NA    Discharge Plan: Patient to continue home program.    Referring Provider:  Wendy Gonzalez

## 2025-01-13 ENCOUNTER — VIRTUAL VISIT (OUTPATIENT)
Dept: PHARMACY | Facility: CLINIC | Age: 60
End: 2025-01-13
Attending: NURSE PRACTITIONER
Payer: COMMERCIAL

## 2025-01-13 VITALS — WEIGHT: 255 LBS | HEIGHT: 71 IN | BODY MASS INDEX: 35.7 KG/M2

## 2025-01-13 DIAGNOSIS — E66.01 CLASS 2 SEVERE OBESITY DUE TO EXCESS CALORIES WITH SERIOUS COMORBIDITY AND BODY MASS INDEX (BMI) OF 36.0 TO 36.9 IN ADULT (H): Primary | ICD-10-CM

## 2025-01-13 DIAGNOSIS — E66.812 CLASS 2 SEVERE OBESITY DUE TO EXCESS CALORIES WITH SERIOUS COMORBIDITY AND BODY MASS INDEX (BMI) OF 36.0 TO 36.9 IN ADULT (H): Primary | ICD-10-CM

## 2025-01-13 RX ORDER — DOCUSATE SODIUM 100 MG/1
100 CAPSULE, LIQUID FILLED ORAL 2 TIMES DAILY PRN
COMMUNITY

## 2025-01-13 ASSESSMENT — PAIN SCALES - GENERAL: PAINLEVEL_OUTOF10: NO PAIN (0)

## 2025-01-13 NOTE — PROGRESS NOTES
"Video-Visit Details    Type of service:  Video Visit    Video Start Time: 3:23 PM   Video End Time: 3:45 PM     Originating Location (pt. Location): Home    Distant Location (provider location):  Offsite (providers home) [facility] WE[facility] MANAGEMENT CLINIC [place]     Platform used for Video Visit: [employer]      Weight Management Nutrition Consultation    Jacqui Grijalva is a 59 year old female presents today for return weight management nutrition consultation.  Patient referred by Wendy Gonzalez PA-C on May 30, 2024.    Patient with Co-morbidities of obesity includin/29/2024     3:41 PM   --   I have the following health issues associated with obesity Sleep Apnea    GERD (Reflux)    Stress Incontinence    Osteoarthritis (joint disease)   I have the following symptoms associated with obesity Knee Pain    Fatigue     MAFLD     Anthropometrics:  Initial:  Estimated body mass index is 41 kg/m  as calculated from the following:    Height as of 24: 1.803 m (5' 11\").    Weight as of 24: 133.4 kg (294 lb).      Tanita Scale:  Date: 24  BW: 272 lbs - down 22 lbs from May body comp  Fat%: 46.9% (desirable range: 23--33.9%) - down 4%   Muscle%: 50.4%  Muscle Mass: 137.2 lbs - Up 1 lb   Visceral Fat Ratin (optimal less than 13) - Down two points  BMR: 2038 kcal    Current:  Estimated body mass index is 35.57 kg/m  as calculated from the following:    Height as of 25: 1.803 m (5' 11\").    Weight as of this encounter: 115.7 kg (255 lb). (-13 lbs from last RD visit 10/31, -39 lbs (13%) from initial)       Medications for Weight Loss:  Wegovy    NUTRITION HISTORY  Food allergies: None  Food intolerances: spicy foods   Vitamin/Mineral Supplements: MVI, calcium and collagen    Previous methods of diet modification for weight loss: In last 6 months, started tracking intermittently in [employer] mariela. Identifies needing to moderate carb intake.   Working 8-4, M-F  Doesn't love to cook  Tries not to " "drink calories  Identifies over eating at night. Comfort eating - stress management, reward.   Pt goals - decrease carb intake, decrease binge eating, healthier swaps for snacking     7/18/24- Doing very well with diet change. Consistently tracking in [employer] mariela. Average calorie intake of 1600 tamie/day, 85 gm protein/day, 16 gm fiber/day.  If dining out, looking for healthier options.  Significantly reduced snacking, also if having portioning out and much more conscious.    Significantly increased exercise  Potential challenges - needing more variety, higher cost of protein foods    9/10/24 - Working on increasing fiber intake Has tracked consistently for 100 days. Some days more difficult to track - busy schedule, or knows she will go over calorie goal. Eating three meals daily.     10/31/24 - Started Wegovy 5 weeks ago, mild headache and nausea. Lost 6 lbs initially.  Has been at a plateau of 268 lbs for last 4 weeks.     Today - Continues Wegovy 1 mg. Some mild constipation, nausea.   Stopped nutritional tracking in Dec. Doing well with maintaining healthful diet change. Would like to discuss strategies for continued calorie deficit without tracking.     Progress Towards Previous Goals:  1) Follow 1300 calorie/day plan in [employer] mariela. - Met, logged for 6 months continuously and now would like to focus on other strategies.   Protein: 85+ gm daily (25+% of total calories)   Carb: 45-60 gm per meal, 15-30 gm per snack or less. (30-50% of total calories)   Fat: 45 gm daily (30% of total calories)  Fiber: 25+ gm   2) Look into \"Class Pass\" for studio exercise classes to try. - Met. Explored lots of different class on the mariela. Found some she really enjoys and continues three times weekly.    Physical Activity:  Exercise: 210-315 mins per week - PT for ankle, biking, walking, golfing, WellBeats exercises, little of weight lifting.    Class Pass class three times weekly.     Nutrition Prescription  Recommended " energy/nutrient modification.    Nutrition Diagnosis  Obesity r/t long history of positive energy balance aeb BMI >30. - Improving    Nutrition Intervention  Materials/education provided on hypocaloric diet for weight loss.   Reviewed progress towards previous goals. Praised pt on the progress she has made.   Discussed strategies for portion control and lower calorie food choices to maintain calor deficit and progress weight loss.   Co-developed goals to work towards.   Provided pt with list of goals and resources below via [employer].     Expected Engagement: good  Follow-Up Plans: portion control     Nutrition Goals  1) At meals, use the following serving recommendations:    - 1 Palm-size portion of lean protein   - 1-2 servings starch (whole grains and starchy veggies)   - 2 servings veggies and/or fruit   - 1-2 servings fat   - See The Plate Method below for guidance on portion sizes  2) Keep up the excellent work with exercise!      The Plate Method: https://[email or web address]/[email or web address]      Follow-Up:  2 months    Time spent with patient: 22 minutes.  Ann Marie Bermudez, ROSEMARIE, LD

## 2025-01-13 NOTE — PROGRESS NOTES
Medication Therapy Management (MTM) Encounter    ASSESSMENT:                            Medication Adherence/Access: No issues identified.    Weight Management:   Due to [facility] insurance discontinuing coverage of GLP1 agonists for Weight Management in 2025 year, much of today's discussion was spent discussing next steps including alternative injectable options - paying out of pocket w/ savings card cost, compound product through [facility], and others. From this, patient wishing to transition to compound semaglutide through [facility] after current supply of Wegovy is out.    Completed teaching of administration of compound semaglutide. Discussed mechanism of action, side effects, warnings, and efficacy. Discussed appropriate storage and stability. Answered patient questions.     PLAN:                            Continue Wegovy at 1 mg weekly for now  Can trial extending dosing to every 10-14 days if you wish  Once you run out of your Wegovy stock, to transition to compound [facility]ide [facility].     Follow-up: Return in about 8 weeks (around 3/10/2025) for Medication Therapy Management Pharmacist Visit.    SUBJECTIVE/OBJECTIVE:                          Jacqui Grijalva is a 59 year old female seen for a follow-up visit.       Reason for visit: Weight Management follow up. [facility] insurance.     Allergies/ADRs: Reviewed in chart  Past Medical History: Reviewed in chart  Tobacco: She reports that she has never smoked. She has never used smokeless tobacco.    Medication Adherence/Access: no issues reported.    Weight Management   Wegovy 1 mg weekly     Follows Carole Guo PA-C for [facility].  Working with Meri Greenberg, health . She is finding that Wegovy continues to work well as tool to diminish food noise, appetite lowering. No medication side effects with increase Wegovy to 1 mg  "weekly other than nausea day after injection. Was having constipation but with docusate and increasing fiber this has improved. Headaches intermittently and unsure if related but not bothersome, alleviated by acetaminophen when does need to take something for headache.    Nutrition/Eating Habits: working with dietitian, Ann Marie Bermudez. She feels she \"totally changed\" her diet. Has been successful in increasing protein intake and fiber. She is consistent with meals. She has been able to eat in caloric deficit.   Exercise/Activity: Ankle fusion surgery 12/2023. In summer she achieved her goals of walkign 9 hold golf course and then [place] steps [place]. Now doing exercise classes 2-3 days per week.     Initial Consult Weight: 294 lb      Current weight today: 255 lbs 0 oz  Cumulative Weight Loss: -39 lb, -13.3% from baseline    Wt Readings from Last 4 Encounters:   01/13/25 255 lb (115.7 kg)   11/25/24 262 lb (118.8 kg)   11/11/24 264 lb 8 oz (120 kg)   11/11/24 266 lb (120.7 kg)     Estimated body mass index is 35.57 kg/m  as calculated from the following:    Height as of this encounter: 5' 11\" (1.803 m).    Weight as of this encounter: 255 lb (115.7 kg).    Today's Vitals: Ht 5' 11\" (1.803 m)   Wt 255 lb (115.7 kg)   LMP 08/01/2016 (Approximate)   BMI 35.57 kg/m    ----------------    I spent 15 minutes with this patient today. All changes were made via collaborative practice agreement with Sophie Vivar.     A summary of these recommendations was sent via [employer].    Lauren Bloch, PharmD, BCACP   Medication Therapy Management Pharmacist   [facility] Comprehensive [facility] Management [facility]    Telemedicine Visit Details  The patient's medications can be safely assessed via a telemedicine encounter.  Type of service:  Telephone visit  Originating Location (pt. Location): Home    Distant Location (provider location):  On-site  Start Time:  9:40 AM  End Time:  9:55 AM     Medication Therapy " Recommendations  No medication therapy recommendations to display

## 2025-01-13 NOTE — NURSING NOTE
Current patient location: Patient declined to provide     Is the patient currently in the state of [place]? YES    Visit mode: TELEPHONE    If the visit is dropped, the patient can be reconnected by:TELEPHONE VISIT: Phone number:   Telephone Information:   Mobile [phone]       Will anyone else be joining the visit? NO  (If patient encounters technical issues they should call [phone])    Are changes needed to the allergy or medication list? Yes Pt states taking Magnesium Caltrate OTC, Pepcid PRN, Psyllium, Gas X, and Melatonin dose increased to 10mg.  Pt states there are additional OTC medications she cannot think of right now, but will provide during visit.    Are refills needed on medications prescribed by this physician? Discuss with provider    Rooming Documentation:  Questionnaire(s) not pre-assigned    Reason for visit: Medication Therapy Management    Khari KRAMER

## 2025-01-13 NOTE — PATIENT INSTRUCTIONS
Recommendations from today's [facility] visit:                                                         Continue Wegovy at 1 mg weekly for now  Can trial extending dosing to every 10-14 days if you wish  Once you run out of your Wegovy stock, to transition to compound semaglutide through [facility].     Follow-up: 8-12 weeks with [facility] pharmacist  Appointments in Next Year      Jan 14, 2025 3:30 PM  (Arrive by 3:15 PM)  Return Weight Management Nutrition with Ann Marie Bermudez RD  [facility] Weight Management Clinic [facility] ) [phone]     Feb 07, 2025 12:30 PM  (Arrive by 12:15 PM)  Support Group with Meri Greenberg  [facility] Weight Management [facility] ([facility] ) [phone]     Feb 14, 2025 2:00 PM  (Arrive by 1:45 PM)  Return 24 Week Plan Health  with Meri Greenberg  [facility] Weight Management [facility] ) [phone]     Feb 19, 2025 11:00 AM  (Arrive by 10:45 AM)  Return Sleep Patient with Krysten Velarde MD  [facility] [place] ([facility] - [facility] ) [phone]     Apr 04, 2025 12:30 PM  (Arrive by 12:15 PM)  Support Group with Meri Greenberg  [facility] [place] ([facility] ) [phone]     May 29, 2025 2:15 PM  (Arrive by 2:00 PM)  MyChart Dermatology Return with Fabrice Valera MD  [facility] Dermatology [facility] ([facility] ) [phone]     May 29, 2025 4:30 PM  (Arrive by 4:15 PM)  Return Weight Management Visit with Wendy Gonzalez PA-C  [facility] Weight Management [facility] ([facility] ) [phone]            It was great speaking with you today.  I value your experience and  "would be very thankful for your time in providing feedback in our clinic survey. In the next few days, you may receive an email or text message from [facility] [employer] with a link to a survey related to your  clinical pharmacist.\"     To schedule another [facility] appointment, please call the clinic directly or you may call the MTM scheduling line at [phone] or toll-free at [phone].     My Clinical Pharmacist's contact information:                                                      Please feel free to contact me with any questions or concerns you have.      Lauren Bloch, PharmD  Medication Therapy Management Pharmacist   [facility] [place]    Compound Semaglutide at [facility]  [facility] is offering compounded semaglutide during the time of Wegovy/Ozempic national shortage. Semaglutide is the generic name of Wegovy (for Weight Management) and Ozempic (for Type 2 Diabetes). [facility] is following the highest standards for sterility and compounding practices. Compounding of semaglutide is legal for as long as Wegovy/Ozempic are on the FDA's drug shortage list. When Wegovy/Ozempic are taken off the FDA's shortage list, compounding semaglutide will no longer be available/legal. Pharmacy can provide 1 last refill up to 1 month from resolution of shortage date on FDA drug shortage list. When this occurs, patients will have to turn to acquiring Wegovy via its available manufactured pen, look into alternative weight loss medication(s), or stop the medication. Due to high demand of compound semaglutide, orders may take 1-2 weeks to obtain from time of prescribing.     As with any weight loss medication(s), there is a risk of weight regain should you stop semaglutide. It is important to be aware of this risk should you stop compounded semaglutide with no plans to transition back to an alternative injectable option. The use of semaglutide as a " "weight management medication, is intended for long term use.     Obtaining Medication From Pharmacy:   Automated call will contact patient when pharmacy has received RX. Patient must call the [facility] back to speak directly to team member prior to shipping medication to verify patient name, product, shipping, and cost. During this call, pharmacy technician will verify if needles/syringes will be needed and can be added to order for $5 additional cost per 10 unit syringe packs. [facility]ials [facility] compounded [facility]ag[facility] your home in a refrigerated box. The vial you will be given is a multi-use vial, meaning that you will use the same vial during the next 28 days for each of your injections. Use a new syringe for each injection. The syringe that will be used to draw up your dose is a \"unit\" syringe, meaning your dose will have an associated \"mg\" and \"units\". Please see your prescription and the below information to verify the dose you should be injecting in UNITS prior to injection.     Storage:  Keep your medication stored in the refrigerator. The vials are to be discarded 28 days after opening (even if there is remaining medication in the vial).     Do not pre-fill syringes from the multi-use vial for future use. Sterility cannot be verified. You should only be drawing up the amount needed for the injection that day, then placing vial back into refrigerator for storage for next injection.     Common Side Effects:  Side effect profile is the same as Wegovy. Monitor for nausea, diarrhea, constipation, headache, indigestion, tiredness (fatigue), stomach upset or abdominal pain. Less commonly, semaglutide can cause low blood sugar (symptoms: shaky, dizzy, sweaty, agitation). Please reach out to the care team should you feel like this is occurring. It is important to ensure that you are eating consistent meals and not skipping meals. Ensure you are getting at least 64 oz " water daily. If any side effects become unmanageable, contact the care team immediately. See Wegovy package insert for rare but serious side effects, contraindications and warnings.     Dosing:  Each week you will have the opportunity/option to increase your dose. However, therapy is individualized for each patient. The below is a general guide should someone increase dosage of compound semaglutide each month.     *If you are having nausea or other side effects to where you are hesitant to move up to the next dose, stay at the same dose you are on for an additional 2-4 weeks to see if side effect(s) improve/resolve. Make sure to take this time to hydrate and utilizing smaller more consistent meals, such as 4-6 small meals per day.  It may be advantageous to stay at the same dose if you are seeing good efficacy (both on and off the scale) and having minimal/manageable side effects. If you do not have additional refills on that dose's prescription, please reach out to the clinic.    Mg to Unit Conversion Chart for Reference:         Administration:  Follow the instructions to fill the syringe with medicine. Instructions can be accessed at [email or web address] or by scanning this QR code-    Follow the instructions to inject your medication. Instructions can be accessed at [email or web address]  or by scanning this QR code-      Syringe Disposal:   Place the used syringe in a sharps container. You can buy a sharps container at your local pharmacy.   If you don't have a sharps container, you can use a plastic detergent container with a lid.   Visit Learning About Safe Needle Use and Disposal (healthwise.net) and safeneedledisposal.org for more information.     Cost:   $230 per month for doses 1 mg or less (one 1 mL vial), $370 per month for doses higher than 1 mg (two 1 mL vials)   Optional $5 per 10 pack unit needle/syringe, no additional RX required    [facility] Phone:   [phone]    States to which [facility] is able to ship to: [place], [place], [place], [place], [place], [place]

## 2025-01-14 ENCOUNTER — VIRTUAL VISIT (OUTPATIENT)
Dept: ENDOCRINOLOGY | Facility: CLINIC | Age: 60
End: 2025-01-14
Payer: COMMERCIAL

## 2025-01-14 VITALS — BODY MASS INDEX: 35.57 KG/M2 | WEIGHT: 255 LBS

## 2025-01-14 DIAGNOSIS — Z71.3 NUTRITIONAL COUNSELING: Primary | ICD-10-CM

## 2025-01-14 DIAGNOSIS — E66.9 OBESITY: ICD-10-CM

## 2025-01-14 PROCEDURE — 99207 PR NO CHARGE LOS: CPT | Mod: 95 | Performed by: DIETITIAN, REGISTERED

## 2025-01-14 PROCEDURE — 97803 MED NUTRITION INDIV SUBSEQ: CPT | Mod: 95 | Performed by: DIETITIAN, REGISTERED

## 2025-01-14 NOTE — PATIENT INSTRUCTIONS
Rinku Osman,     Follow-up with dietitian in 2 months.     Thank you,    Ann Marie Bermudez, ROSEMARIE, LD  If you would like to schedule or reschedule an appointment with the RD, please call [phone]    Nutrition Goals  1) At meals, use the following serving recommendations:    - 1 Palm-size portion of lean protein   - 1-2 servings starch (whole grains and starchy veggies)   - 2 servings veggies and/or fruit   - 1-2 servings fat   - See The Plate Method below for guidance on portion sizes  2) Keep up the excellent work with exercise!      The Plate Method: https://[email or web address]              [facility]   Healthy Lifestyle Group    Healthy Lifestyle Coaching Group?  This is a 60 minute virtual coaching group for those who want to lead a healthier lifestyle. Come together to set goals and overcome barriers in a supportive group environment. We will address the four pillars of health--nutrition, exercise, sleep and emotional well-being. This group is highly recommended for those who are participating in the 24 week Healthy Lifestyle Plan and our Health Coaching sessions. All are welcome!    WHEN: This group meets the first Friday of the month, 12:30 PM - 1:30 PM online, via a zoom meeting.      FACILITATOR: Led by National Board Certified Health and , Meri Greenberg [facility].     TO REGISTER: Please call the Call Center at [phone] to register. You will get an appointment to attend in [employer]. Fifteen minutes prior to the meeting, complete the e-check in and you will get the link to join the meeting.  There is no charge to attend this group and space is limited.   Please register for each month you wish to attend    PLEASE NOTE: There will be NO GROUP on March 7 and July 4, 2025 2024 and 2025 GROUP MEETING DATES:   October 4, 2024  November 1, 2024  December 6, 2024  January 3, 2025  February 7, 2025  No meeting March 7, 2025  April 4, 2025  May 2,  2025 June 6, 2025  No meeting July 4, 2025 August 1, 2025      Work with A Health !  Virtual 1:1 Sessions are Available through [facility]    To learn more, call to schedule a free, Health  Q&A appointment: [phone]     What is Health Coaching?  Do you know what you are supposed to do, but you just aren't doing it?  Then, HEALTH COACHING may help you!   Get unstuck and move forward with the support of a professionally trained NBC-HWC (National Board-Certified Health and ) who uses evidence-based approaches to help you move forward with healthy lifestyle changes in the areas of weight loss, stress management and overall well-being.    Health Coaches help you identify goals that will work best for you. Health Coaches provide support and encouragement with overcoming barriers and help you to find inspiration and motivation to lead a healthy lifestyle.    Option one:  Health Coaching 3-Pack; Three, 30-minute Health Coaching Visits, for $99  Visits are done virtually (phone or video)  This is a self pay service; we do not accept insurance for lauren coaching.    Option two:   The 24 week Plan; 11 Health Coaching Visits, and a 7 months subscription to [employer]- on-demand fitness, nutrition and mindfulness classes, for $499 (employee discounts may be available). Participants will also meet regularly with a weight management Medical Provider and a Registered/Licensed Dietician.  This is a self-pay service; we do not accept insurance for health coaching.    To Schedule a free Health  Q&A appointment to learn more,  call [phone].

## 2025-01-14 NOTE — NURSING NOTE
Current patient location: [address]   [place] [address]    Is the patient currently in the state of [place]? YES    Visit mode:VIDEO    If the visit is dropped, the patient can be reconnected by: VIDEO VISIT: Send to e-mail at: [email or web address]    Will anyone else be joining the visit? NO  (If patient encounters technical issues they should call [phone]    Are changes needed to the allergy or medication list? Pt stated no changes to allergies and Pt stated no med changes    Are refills needed on medications prescribed by this physician? NO    Reason for visit: RECHECK    Cristina KRAMER

## 2025-01-14 NOTE — LETTER
"2025       RE: Jacqui Grijalva  [address]  [place] [address]     Dear Colleague,    Thank you for referring your patient, Jacqui Grijalva, to the [facility]NAG[facility]IC [place] at [facility]. Please see a copy of my visit note below.    Video-Visit Details    Type of service:  Video Visit    Video Start Time: 3:23 PM   Video End Time: 3:45 PM     Originating Location (pt. Location): Home    Distant Location (provider location):  Offsite (providers home) [facility] [place]     Platform used for Video Visit: [employer]      Weight Management Nutrition Consultation    Jacqui Grijalva is a 59 year old female presents today for return weight management nutrition consultation.  Patient referred by Wendy Gonzalez PA-C on May 30, 2024.    Patient with Co-morbidities of obesity includin/29/2024     3:41 PM   --   I have the following health issues associated with obesity Sleep Apnea    GERD (Reflux)    Stress Incontinence    Osteoarthritis (joint disease)   I have the following symptoms associated with obesity Knee Pain    Fatigue     MAFLD     Anthropometrics:  Initial:  Estimated body mass index is 41 kg/m  as calculated from the following:    Height as of 24: 1.803 m (5' 11\").    Weight as of 24: 133.4 kg (294 lb).      Tanita Scale:  Date: 24  BW: 272 lbs - down 22 lbs from May body comp  Fat%: 46.9% (desirable range: 23--33.9%) - down 4%   Muscle%: 50.4%  Muscle Mass: 137.2 lbs - Up 1 lb   Visceral Fat Ratin (optimal less than 13) - Down two points  BMR: 2038 kcal    Current:  Estimated body mass index is 35.57 kg/m  as calculated from the following:    Height as of 25: 1.803 m (5' 11\").    Weight as of this encounter: 115.7 kg (255 lb). (-13 lbs from last RD visit 10/31, -39 lbs (13%) from initial)       Medications for Weight " "Loss:  Wegovy    NUTRITION HISTORY  Food allergies: None  Food intolerances: spicy foods   Vitamin/Mineral Supplements: MVI, calcium and collagen    Previous methods of diet modification for weight loss: In last 6 months, started tracking intermittently in [employer] mariela. Identifies needing to moderate carb intake.   Working 8-4, M-F  Doesn't love to cook  Tries not to drink calories  Identifies over eating at night. Comfort eating - stress management, reward.   Pt goals - decrease carb intake, decrease binge eating, healthier swaps for snacking     7/18/24- Doing very well with diet change. Consistently tracking in [employer] mariela. Average calorie intake of 1600 tamie/day, 85 gm protein/day, 16 gm fiber/day.  If dining out, looking for healthier options.  Significantly reduced snacking, also if having portioning out and much more conscious.    Significantly increased exercise  Potential challenges - needing more variety, higher cost of protein foods    9/10/24 - Working on increasing fiber intake Has tracked consistently for 100 days. Some days more difficult to track - busy schedule, or knows she will go over calorie goal. Eating three meals daily.     10/31/24 - Started Wegovy 5 weeks ago, mild headache and nausea. Lost 6 lbs initially.  Has been at a plateau of 268 lbs for last 4 weeks.     Today - Continues Wegovy 1 mg. Some mild constipation, nausea.   Stopped nutritional tracking in Dec. Doing well with maintaining healthful diet change. Would like to discuss strategies for continued calorie deficit without tracking.     Progress Towards Previous Goals:  1) Follow 1300 calorie/day plan in [employer] mariela. - Met, logged for 6 months continuously and now would like to focus on other strategies.   Protein: 85+ gm daily (25+% of total calories)   Carb: 45-60 gm per meal, 15-30 gm per snack or less. (30-50% of total calories)   Fat: 45 gm daily (30% of total calories)  Fiber: 25+ gm   2) Look into \"Class Pass\" for studio exercise " classes to try. - Met. Explored lots of different class on the mariela. Found some she really enjoys and continues three times weekly.    Physical Activity:  Exercise: 210-315 mins per week - PT for ankle, biking, walking, golfing, Well[employer] exercises, little of weight lifting.    Class Pass class three times weekly.     Nutrition Prescription  Recommended energy/nutrient modification.    Nutrition Diagnosis  Obesity r/t long history of positive energy balance aeb BMI >30. - Improving    Nutrition Intervention  Materials/education provided on hypocaloric diet for weight loss.   Reviewed progress towards previous goals. Praised pt on the progress she has made.   Discussed strategies for portion control and lower calorie food choices to maintain calor deficit and progress weight loss.   Co-developed goals to work towards.   Provided pt with list of goals and resources below via [employer].     Expected Engagement: good  Follow-Up Plans: portion control     Nutrition Goals  1) At meals, use the following serving recommendations:    - 1 Palm-size portion of lean protein   - 1-2 servings starch (whole grains and starchy veggies)   - 2 servings veggies and/or fruit   - 1-2 servings fat   - See The Plate Method below for guidance on portion sizes  2) Keep up the excellent work with exercise!      The Plate Method: [email or web address]      Follow-Up:  2 months    Time spent with patient: 22 minutes.  Ann Marie Bermudez RD, LD        Again, thank you for allowing me to participate in the care of your patient.      Sincerely,    Ann Marie Bermudez RD

## 2025-02-09 ENCOUNTER — MYC REFILL (OUTPATIENT)
Dept: FAMILY MEDICINE | Facility: CLINIC | Age: 60
End: 2025-02-09
Payer: COMMERCIAL

## 2025-02-09 DIAGNOSIS — B00.1 COLD SORE: ICD-10-CM

## 2025-02-09 NOTE — TELEPHONE ENCOUNTER
DIAGNOSIS: left knee pain / no images / self / UMR    APPOINTMENT DATE: 2/11/25   NOTES STATUS DETAILS   OFFICE NOTE from referring provider Internal Self    MEDICATION LIST Internal

## 2025-02-10 RX ORDER — VALACYCLOVIR HYDROCHLORIDE 1 G/1
2000 TABLET, FILM COATED ORAL 2 TIMES DAILY
Qty: 4 TABLET | Refills: 4 | Status: SHIPPED | OUTPATIENT
Start: 2025-02-10

## 2025-02-11 ENCOUNTER — ANCILLARY PROCEDURE (OUTPATIENT)
Dept: GENERAL RADIOLOGY | Facility: CLINIC | Age: 60
End: 2025-02-11
Attending: FAMILY MEDICINE
Payer: COMMERCIAL

## 2025-02-11 ENCOUNTER — PRE VISIT (OUTPATIENT)
Dept: ORTHOPEDICS | Facility: CLINIC | Age: 60
End: 2025-02-11

## 2025-02-11 ENCOUNTER — OFFICE VISIT (OUTPATIENT)
Dept: ORTHOPEDICS | Facility: CLINIC | Age: 60
End: 2025-02-11
Payer: COMMERCIAL

## 2025-02-11 DIAGNOSIS — M25.562 ACUTE PAIN OF LEFT KNEE: Primary | ICD-10-CM

## 2025-02-11 DIAGNOSIS — M17.11 LOCALIZED OSTEOARTHRITIS OF RIGHT KNEE: ICD-10-CM

## 2025-02-11 PROCEDURE — 99214 OFFICE O/P EST MOD 30 MIN: CPT | Performed by: FAMILY MEDICINE

## 2025-02-11 PROCEDURE — 73562 X-RAY EXAM OF KNEE 3: CPT | Mod: LT | Performed by: RADIOLOGY

## 2025-02-11 NOTE — PROGRESS NOTES
[facility]  SPORTS & ORTHOPEDIC CLINIC VISIT     Feb 11, 2025        ASSESSMENT & PLAN    59-year-old with left posterior medial knee pain that is likely due to degenerative meniscus injury    Reviewed imaging and assessment with patient in detail  Recommended trial of topical anti-inflammatory such as diclofenac.  Was also provided with home exercises.  Continue activity modification but okay to resume activity as able.  Could try simple knee sleeve or simple hinged knee brace with return to activity for comfort.  Additionally we discussed the utility of an MRI.  Given her previous experience with her right knee she would be interested in pursuing MRI for further evaluation of the meniscus.  Order was placed she will follow-up with us after the imaging is complete to discuss results and next steps in treatment.    Hal Betancourt MD  [facility] SPORTS MEDICINE CLINIC [place]    -----  Chief Complaint   Patient presents with    Left Knee - Pain       SUBJECTIVE  Jacqui Grijalva is a/an 59 year old female who is seen as a self referral for evaluation of  left knee pain.     The patient is seen by themselves.  The patient is Right handed    Onset: 3 week(s) ago. Patient describes injury as working out at [employer] and hurt the knee on the rowing machine.   Location of Pain: left posterior knee   Worsened by: Bending, vigorous activity, walking   Better with: Tylenol and ice   Treatments tried: ice and Tylenol  Associated symptoms: no distal numbness or tingling; denies swelling or warmth    Orthopedic/Surgical history: NO  Social History/Occupation: [employer]     Tore meniscus in R knee doing yoga ~10y ago. This feels similarly. Had to do surg.   S/p L ankle fusion surgery- stiffness, but otherwise feels good.     REVIEW OF SYSTEMS:  Do you have fever, chills, weight loss? No  Do you have any vision problems? No  Do you have any chest pain or edema? No  Do you have any shortness  of breath or wheezing?  No  Do you have stomach problems? No  Do you have any numbness or focal weakness? No  Do you have diabetes? No  Do you have problems with bleeding or clotting? No  Do you have an rashes or other skin lesions? No    OBJECTIVE:  [facility] 08/01/2016 (Approximate)      Patient is alert, No acute distress, pleasant and conversational.    Gait: nonantalgic. Normal heel toe gait.    left knee:   Skin intact. No erythema or ecchymosis.  No effusion or soft tissue swelling.    AROM: Zero to approximately 135  without restriction or reported pain.    Palpation: No medial or lateral facet joint tenderness.  Ttp posterior medial joint line. No ttp lateral joint line    Special Tests:  Negative bounce test, +forced flexion and + Lucho's.  No ligamentous laxity or pain with valgus or varus stress.  Negative Anterior Drawer and Posterior Drawer     Full Isometric quad strength, extensor mechanism in place     Neurovascularly intact in the lower extremity      RADIOLOGY:    3 view xrays of left knee performed and reviewed independently demonstrating no acute fx. No sifnificant djd. Moderat medial compartment narrowing on right. See EMR for formal radiology report.

## 2025-02-11 NOTE — LETTER
2/11/2025      RE: Jacqui Grijalva  [address]  [place] [address]     Dear Colleague,    Thank you for referring your patient, Jacqui Grijalva, to the [facility]. Please see a copy of my visit note below.      [facility]  SPORTS & ORTHOPEDIC CLINIC VISIT     Feb 11, 2025        ASSESSMENT & PLAN    59-year-old with left posterior medial knee pain that is likely due to degenerative meniscus injury    Reviewed imaging and assessment with patient in detail  Recommended trial of topical anti-inflammatory such as diclofenac.  Was also provided with home exercises.  Continue activity modification but okay to resume activity as able.  Could try simple knee sleeve or simple hinged knee brace with return to activity for comfort.  Additionally we discussed the utility of an MRI.  Given her previous experience with her right knee she would be interested in pursuing MRI for further evaluation of the meniscus.  Order was placed she will follow-up with us after the imaging is complete to discuss results and next steps in treatment.    Hal Betancourt MD  [facility]    -----  Chief Complaint   Patient presents with     Left Knee - Pain       SUBJECTIVE  Jacqui Grijalva is a/an 59 year old female who is seen as a self referral for evaluation of  left knee pain.     The patient is seen by themselves.  The patient is Right handed    Onset: 3 week(s) ago. Patient describes injury as working out at [employer] and hurt the knee on the rowing machine.   Location of Pain: left posterior knee   Worsened by: Bending, vigorous activity, walking   Better with: Tylenol and ice   Treatments tried: ice and Tylenol  Associated symptoms: no distal numbness or tingling; denies swelling or warmth    Orthopedic/Surgical history: NO  Social History/Occupation: [employer]     Tore meniscus in R knee doing yoga ~10y ago.  This feels similarly. Had to do surg.   S/p L ankle fusion surgery- stiffness, but otherwise feels good.     REVIEW OF SYSTEMS:  Do you have fever, chills, weight loss? No  Do you have any vision problems? No  Do you have any chest pain or edema? No  Do you have any shortness of breath or wheezing?  No  Do you have stomach problems? No  Do you have any numbness or focal weakness? No  Do you have diabetes? No  Do you have problems with bleeding or clotting? No  Do you have an rashes or other skin lesions? No    OBJECTIVE:  [facility] 08/01/2016 (Approximate)      Patient is alert, No acute distress, pleasant and conversational.    Gait: nonantalgic. Normal heel toe gait.    left knee:   Skin intact. No erythema or ecchymosis.  No effusion or soft tissue swelling.    AROM: Zero to approximately 135  without restriction or reported pain.    Palpation: No medial or lateral facet joint tenderness.  Ttp posterior medial joint line. No ttp lateral joint line    Special Tests:  Negative bounce test, +forced flexion and + Lucho's.  No ligamentous laxity or pain with valgus or varus stress.  Negative Anterior Drawer and Posterior Drawer     Full Isometric quad strength, extensor mechanism in place     Neurovascularly intact in the lower extremity      RADIOLOGY:    3 view xrays of left knee performed and reviewed independently demonstrating no acute fx. No sifnificant djd. Moderat medial compartment narrowing on right. See EMR for formal radiology report.           Again, thank you for allowing me to participate in the care of your patient.      Sincerely,    Hal Betancourt MD

## 2025-02-11 NOTE — PATIENT INSTRUCTIONS
Try using topical diclofenac on the knee 2-3 times daily as needed for pain    KEY FACTS ABOUT MENISCAL TEARS  Click to enlarge A meniscal tear can be caused by a sudden activity that twists or tears a ligament, such as a forced twisting motion of your knee, or a fall or impact during football, basketball, or soccer.    Straightening your knee further than it can normally be straightened (hyperextension) or trouble bending, like when you land from a jump, is also another cause of a meniscal tear.  A loud, painful pop at the time of the injury is very common. It causes an immediate swelling and pain around the knee as if loose or unstable.    WHAT IS MENISCAL TEAR?  A meniscal tear is tear to the meniscal ligament that keeps the knee from bending inward. The meniscal, along with other ligaments, keeps your knee and leg bones in place when you walk or run. When a ligament is injured, it can be stretched, partially torn, or completely torn. Complete tears make the knee joint very loose and unstable.    A ligament injury is also called a sprain.    HOW IS MENISCAL TEAR DIAGNOSED AND TREATED?  A physician will examine the knee, ligaments, and tissue to make an initial assessment. Typically, he or she will recommend a combination of X-rays, CT scans, or MRIs to determine the exact extent of the damage.     You will need to change or stop doing the activities that cause pain until the ligament has healed.    If you have swelling in your joint, your healthcare team may need to remove fluid from your knee with a needle and syringe.  Your care team may wrap an elastic bandage around your knee to keep the swelling from getting worse. You may need to keep your knee in a knee immobilizer and use crutches to protect your knee while you heal.  For complete tears, you and your healthcare team will decide if you should have intense rehabilitation therapy or if you should have surgery followed by rehab. A torn meniscal cannot be sewn  back together. The ligament must be surgically reconstructed by taking ligaments or tendons from another part of your leg and connecting them to the thighbone and shinbone.  If you have a completely torn mensical and it is not repaired with surgery, the effects will be life-long. Your knee may feel loose and feel like it will give way when you are running and making quick turns. Rehabilitation exercises and a special brace will help improve these symptoms. If you can do your normal activities without pain and are willing to give up activities that put extra stress on your knee, you may not need surgery.    You may consider having reconstructive meniscal tear surgery if:  Your knee is unstable and gives out during routine or athletic activity.  You are a  and your knee could be unstable and give out during your sport (for example, basketball, football, or soccer).  You are not willing to give up sports that involve pivoting and cutting, like soccer and basketball.  You want to prevent further injury to your knee. An unstable knee may lead to more injuries and arthritis.    HOW CAN I MANAGE MENISCAL TEAR?  Follow your healthcare team's instructions, including any recommended physical therapy or exercises.  To keep swelling down and help relieve pain for the first few days after the injury:  Put an ice pack, gel pack, or package of frozen vegetables wrapped in a cloth on the injured area every 3 to 4 hours for up to 20 minutes at a time.  Keep your knee up on a pillow when you sit or lie down.  Take nonprescription pain medicine, such as acetaminophen, ibuprofen, or naproxen. Read the label and take as directed. Unless recommended by your healthcare team, you should not take this medicine for more than 10 days.    Knee Exercises (Meniscal tear)    You may do the first 7 exercises right away. You may do the rest of the exercises when the pain in your knee has decreased.    Passive knee extension: Do  this exercise if you are unable to extend your knee fully. While lying on your back, place a rolled-up towel under the heel of your injured leg so the heel is about 6 inches off the ground. Relax your leg muscles and let gravity slowly straighten your knee. Try to hold this position for 2 minutes. Repeat 3 times. You may feel some discomfort while doing this exercise. Do the exercise several times a day.  This exercise can also be done while sitting in a chair with your heel on another chair or stool.    Heel slide: Sit on a firm surface with your legs straight in front of you. Slowly slide the heel of the foot on your injured side toward your buttock by pulling your knee toward your chest as you slide the heel. Return to the starting position. Do 2 sets of 15.  Standing calf stretch: Stand facing a wall with your hands on the wall at about eye level. Keep your injured leg back with your heel on the floor. Keep the other leg forward with the knee bent. Turn your back foot slightly inward (as if you were pigeon-toed). Slowly lean into the wall until you feel a stretch in the back of your calf. Hold the stretch for 15 to 30 seconds. Return to the starting position. Repeat 3 times. Do this exercise several times each day.    Hamstring stretch on wall: Lie on your back with your buttocks close to a doorway. Stretch your uninjured leg straight out in front of you on the floor through the doorway. Raise your injured leg and rest it against the wall next to the door frame. Keep your leg as straight as possible. You should feel a stretch in the back of your thigh. Hold this position for 15 to 30 seconds. Repeat 3 times.  Straight leg raise: Lie on your back with your legs straight out in front of you. Bend the knee on your uninjured side and place the foot flat on the floor. Tighten the thigh muscle on your injured side and lift your leg about 8 inches off the floor. Keep your leg straight and your thigh muscle tight.  Slowly lower your leg back down to the floor. Do 2 sets of 15.    Prone hip extension: Lie on your stomach with your legs straight out behind you. Fold your arms under your head and rest your head on your arms. Draw your belly button in towards your spine and tighten your abdominal muscles. Tighten the buttocks and thigh muscles of the leg on your injured side and lift the leg off the floor about 8 inches. Keep your leg straight. Hold for 5 seconds. Then lower your leg and relax. Do 2 sets of 15.  Clam exercise: Lie on your uninjured side with your hips and knees bent and feet together. Slowly raise your top leg toward the ceiling while keeping your heels touching each other. Hold for 2 seconds and lower slowly. Do 2 sets of 15 repetitions.    Wall squat with a ball: Stand with your back, shoulders, and head against a wall. Look straight ahead. Keep your shoulders relaxed and your feet 3 feet (90 centimeters) from the wall and shoulder's width apart. Place a soccer or basketball-sized ball behind your back. Keeping your back against the wall, slowly squat down to a 45-degree angle. Your thighs will not yet be parallel to the floor. Hold this position for 10 seconds and then slowly slide back up the wall. Repeat 10 times. Build up to 2 sets of 15.  Step-up: Stand with the foot of your injured leg on a support 3 to 5 inches (8 to 13 centimeters) high --like a small step or block of wood. Keep your other foot flat on the floor. Shift your weight onto the injured leg on the support. Straighten your injured leg as the other leg comes off the floor. Return to the starting position by bending your injured leg and slowly lowering your uninjured leg back to the floor. Do 2 sets of 15.    Knee stabilization: Wrap a piece of elastic tubing around the ankle of your uninjured leg. Tie a knot in the other end of the tubing and close it in a door at about ankle height.  Stand facing the door on the leg without tubing (your injured  leg) and bend your knee slightly, keeping your thigh muscles tight. Stay in this position while you move the leg with the tubing (the uninjured leg) straight back behind you. Do 2 sets of 15.  Turn 90 degrees so the leg without tubing is closest to the door. Move the leg with tubing away from your body. Do 2 sets of 15.  Turn 90 degrees again so your back is to the door. Move the leg with tubing straight out in front of you. Do 2 sets of 15.  Turn your body 90 degrees again so the leg with tubing is closest to the door. Move the leg with tubing across your body. Do 2 sets of 15.  Hold onto a chair if you need help balancing. This exercise can be made more challenging by standing on a firm pillow or foam mat while you move the leg with tubing.    Resisted terminal knee extension: Make a loop with a piece of elastic tubing by tying a knot in both ends. Close the knot in a door at knee height. Step into the loop with your injured leg so the tubing is around the back of your knee. Lift the other foot off the ground and hold onto a chair for balance, if needed. Bend the knee with tubing about 45 degrees. Slowly straighten your leg, keeping your thigh muscle tight as you do this. Repeat 15 times. Do 2 sets of 15. If you need an easier way to do this, stand on both legs for better support while you do the exercise.  If you have access to a wobble board, do the following exercises:            Developed by [employer].  Published by [employer].  Copyright  2014 [employer] and/or one of its subsidiaries. All rights reserved.

## 2025-02-19 ENCOUNTER — OFFICE VISIT (OUTPATIENT)
Dept: SLEEP MEDICINE | Facility: CLINIC | Age: 60
End: 2025-02-19
Payer: COMMERCIAL

## 2025-02-19 VITALS
WEIGHT: 254.1 LBS | HEART RATE: 83 BPM | DIASTOLIC BLOOD PRESSURE: 77 MMHG | HEIGHT: 71 IN | SYSTOLIC BLOOD PRESSURE: 114 MMHG | OXYGEN SATURATION: 100 % | BODY MASS INDEX: 35.57 KG/M2

## 2025-02-19 DIAGNOSIS — R35.1 NOCTURIA: ICD-10-CM

## 2025-02-19 DIAGNOSIS — G47.33 OSA (OBSTRUCTIVE SLEEP APNEA): Primary | ICD-10-CM

## 2025-02-19 DIAGNOSIS — Z72.820 POOR SLEEP: ICD-10-CM

## 2025-02-19 PROCEDURE — 99213 OFFICE O/P EST LOW 20 MIN: CPT | Performed by: INTERNAL MEDICINE

## 2025-02-19 ASSESSMENT — SLEEP AND FATIGUE QUESTIONNAIRES
HOW LIKELY ARE YOU TO NOD OFF OR FALL ASLEEP WHEN YOU ARE A PASSENGER IN A CAR FOR AN HOUR WITHOUT A BREAK: SLIGHT CHANCE OF DOZING
HOW LIKELY ARE YOU TO NOD OFF OR FALL ASLEEP WHILE SITTING QUIETLY AFTER LUNCH WITHOUT ALCOHOL: WOULD NEVER DOZE
HOW LIKELY ARE YOU TO NOD OFF OR FALL ASLEEP WHILE SITTING INACTIVE IN A PUBLIC PLACE: WOULD NEVER DOZE
HOW LIKELY ARE YOU TO NOD OFF OR FALL ASLEEP WHILE SITTING AND READING: WOULD NEVER DOZE
HOW LIKELY ARE YOU TO NOD OFF OR FALL ASLEEP IN A CAR, WHILE STOPPED FOR A FEW MINUTES IN TRAFFIC: WOULD NEVER DOZE
HOW LIKELY ARE YOU TO NOD OFF OR FALL ASLEEP WHILE WATCHING TV: WOULD NEVER DOZE
HOW LIKELY ARE YOU TO NOD OFF OR FALL ASLEEP WHILE LYING DOWN TO REST IN THE AFTERNOON WHEN CIRCUMSTANCES PERMIT: SLIGHT CHANCE OF DOZING
HOW LIKELY ARE YOU TO NOD OFF OR FALL ASLEEP WHILE SITTING AND TALKING TO SOMEONE: WOULD NEVER DOZE

## 2025-02-19 NOTE — NURSING NOTE
"Chief Complaint   Patient presents with    CPAP Follow Up       Initial /77   Pulse 83   Ht 1.803 m (5' 10.98\")   Wt 115.3 kg (254 lb 1.6 oz)   LMP 08/01/2016 (Approximate)   SpO2 100%   BMI 35.46 kg/m   Estimated body mass index is 35.46 kg/m  as calculated from the following:    Height as of this encounter: 1.803 m (5' 10.98\").    Weight as of this encounter: 115.3 kg (254 lb 1.6 oz).    Medication Reconciliation: complete    Neck circumference: 42 centimeters.    DME: Julieta Trejo on 2/19/2025  "

## 2025-02-19 NOTE — PROGRESS NOTES
[facility] [address] [address]  Phone: [phone]    Patient:  Jacqui Grijalva, Date of birth 1965  Date of Visit:  02/19/2025  Referring Provider Krysten Velarde                  Assessment and Plan:   Jacqui Grijalva is a 59 year old female with history of class III obesity on a weight loss program with ongoing success, has been diagnosed with moderate obstructive sleep apnea with significant positional variability.  After initial consistent CPAP usage there has been a significant decline in her recent CPAP usage as she continues to wake up quite frequently during the night.   Sleep disordered breathing.  Moderate obstructive sleep apnea with a combined apnea hypopnea index of 23/h.  She does have significant positional variability with her supine apnea-hypopnea index at 53/h.  There is a significant decline in her PAP usage which is associated with nocturnal arousals with frequent intermittent air leak findings.  She is currently using a nasal pillow.  Presentation is most suggestive of nosebleeding leading to suboptimal airway pressures due to residual respiratory events.  Patient stable to be treated.  Nocturia.  She is waking up between 3-5 times during the night due to urinary urgency.  This likely is related to untreated sleep disordered  Gastroesophageal reflux disease.  Further improvement with ongoing weight loss and dietary modifications and partial CPAP usage.     Comorbid Diagnoses:     Class III obesity.  Gastroesophageal reflux disease.     Summary Recommendations:     Patient is strongly advised to improve her CPAP therapy usage.  She has been a mouth breather and likely the current nasal pillow interface is leading to intermittent large air leaks, suboptimal airway pressures and residual respiratory events.  Changed to a fullface mask or hybrid F30 mask.  Jacqui will be enrolled in our sleep therapy management program for close  follow-up, optimizing prescribed CPAP therapy.  Advised to follow-up in 3 months to reassess PAP tolerance and usage.    Summary Counseling:  Check out [email or web address]           History of Present Illness:     Jacqui Grijalva is a 59 year old female with above-mentioned medical history has underlying moderate obstructive sleep apnea with significant positional variability.  Despite initial recommendations, the DME offered her a nasal pillow.  Being a mouth breather she is waking up frequently during the night with a dry mouth and there is evidence of moderate to large air leak.  His most suggestive of suboptimal airway pressures which can result in arousals either due to large air leaks or respiratory events including upper airway resistance.  She is waking up every 2 hours during the night and is very discontent with the quality of her sleep.  We had a detailed discussion regarding optimum usage of positive airway pressure therapy and she is in agreement to try a fullface interface.  Health    PREVIOUS SLEEP STUDIES: NOx T3 HST  Date: 10/8/2024  AHI: 23/h, supine AHI 53/h  Sleep Architecture: Analysis time 435 minutes    CURRENT THERAPY-Subjective:  Workday bedtime 10 PM  Awakening 6 AM with using alarm   Nonworkday bedtime 11 PM Awakening 7 AM without alarm   Awakenings 3-5 for 1-5 minutes either due pain in past or to use the bathroom/week  Naps: Denies     Overall, she rates the experience with PAP as 4 (0 poor, 10 great). The mask is comfortable. The mask is leaking.  She is not snoring with the mask on. She possibly having gasp arousals.  She is having significant oral/nasal dryness. The pressure is comfortable.     Her PAP interface is Nasal Pillows.    She does not feel rested in the morning.    Sabin Sleepiness Scale: 2/24    KOMAL Total Score: (Patient-Rptd) 19      Objective:  CPAP Compliance Targets:   >70% days > 4 hours AHI < 5   30 days ending February 19, 2025   [employer]   Auto-PAP 5.0 -  15.0 cmH2O 30 day usage data:    23% of days with > 4 hours of use. 5/30 days with no use.   Average use 154 minutes per day.   95%ile Leak 27.46 L/min.   CPAP 95% pressure 10.9 cm.   AHI 2 events per hour.                 Medications:     Current Outpatient Medications   Medication Sig Dispense Refill    acetaminophen (TYLENOL) 500 MG tablet Take 500-1,000 mg by mouth every 6 hours as needed for mild pain      calcium carbonate-vitamin D (CALTRATE) 600-10 MG-MCG per tablet Take 1 tablet by mouth daily.      diclofenac (VOLTAREN) 1 % topical gel Apply 4 grams to knees up to four times daily using enclosed dosing card. 100 g 1    docusate sodium (COLACE) 100 MG capsule Take 100 mg by mouth 2 times daily as needed for constipation.      ibuprofen (ADVIL/MOTRIN) 200 MG tablet Take 2-3 tablets by mouth every 6 hours as needed.      melatonin 5 MG tablet Take 5 mg by mouth nightly as needed for sleep      multivitamin w/minerals (THERA-VIT-M) tablet Take 1 tablet by mouth daily      Semaglutide-Weight Management (WEGOVY) 1 MG/0.5ML pen Inject 1 mg subcutaneously once a week. 6 mL 0    triamcinolone (KENALOG) 0.1 % external ointment Apply topically 2 times daily. 160 g 5    valACYclovir (VALTREX) 1000 mg tablet Take 2 tablets (2,000 mg) by mouth 2 times daily. 4 tablet 4     No current facility-administered medications for this visit.        Allergies   Allergen Reactions    Pcn [Penicillins]             Past Medical History:     Does not need 02 supplement at night   Past Medical History:   Diagnosis Date    Pain in joint, ankle and foot, left     traumatic injury in childhood    Post-traumatic osteoarthritis of right knee 07/11/2016             Past Surgical History:    No h/o  upper airway surgery  Past Surgical History:   Procedure Laterality Date    ARTHRODESIS ANKLE Left 12/27/2023    Procedure: FUSION, JOINT, ANKLE - LEFT;  Surgeon: Aman Rincon MD;  Location: [facility] OR    COLONOSCOPY N/A 7/23/2024     "Procedure: Colonoscopy;  Surgeon: Arthur Lujan MD;  Location:  GI    right knee      Meniscus injection    STRIP VEIN Bilateral               Physical Examination:     Objective   Vitals: /77   Pulse 83   Ht 1.803 m (5' 10.98\")   Wt 115.3 kg (254 lb 1.6 oz)   LMP 08/01/2016 (Approximate)   SpO2 100%   BMI 35.46 kg/m     General: Normal.  Healthy, alert, and no distress  Psych: Alert and oriented times 3; coherent speech, normal   rate and volume, able to articulate logical thoughts, able   to abstract reason, no tangential thoughts, no hallucinations   or delusions  His affect is normal.    Copy to: Bijal Browne    Total of 28 minutes of time was spent with patient, this included the interview and exam, and review of the chart/labs/imaging/sleep study/PAP therapy data on 02/19/2025. Greater than 50% of which was spent counseling and coordinating care.   Syd Bashir MD 2/19/2025     [facility]   [address]   [address] [place] [address]. [place] [address]   Appointments: [phone]  Syd Bashir MD         "

## 2025-02-20 ENCOUNTER — TELEPHONE (OUTPATIENT)
Dept: ORTHOPEDICS | Facility: CLINIC | Age: 60
End: 2025-02-20
Payer: COMMERCIAL

## 2025-02-20 NOTE — TELEPHONE ENCOUNTER
Other: Patient called and stated her knee pain is feeling better. Patient is wondering if she should cancel her MRI and follow . Please call patient back.     Could we send this information to you in [employer] or would you prefer to receive a phone call?:   Patient would prefer a phone call   Okay to leave a detailed message?: Yes at Cell number on file:    Telephone Information:   Mobile [phone]

## 2025-02-20 NOTE — TELEPHONE ENCOUNTER
I left a message for the patient and let her know that if she is feeling better and able to do everything she would like to do she can cancel her MRI if she would like. I let her know the order does work for about a year so if something comes up in the future and she would like to get it completed later on that is an option. Call back number was given for any additional questions or concerns.     KRISTIN Quan

## 2025-03-04 ENCOUNTER — VIRTUAL VISIT (OUTPATIENT)
Dept: PHARMACY | Facility: CLINIC | Age: 60
End: 2025-03-04
Payer: COMMERCIAL

## 2025-03-04 DIAGNOSIS — E66.812 CLASS 2 SEVERE OBESITY DUE TO EXCESS CALORIES WITH SERIOUS COMORBIDITY AND BODY MASS INDEX (BMI) OF 36.0 TO 36.9 IN ADULT (H): Primary | ICD-10-CM

## 2025-03-04 DIAGNOSIS — E66.01 CLASS 2 SEVERE OBESITY DUE TO EXCESS CALORIES WITH SERIOUS COMORBIDITY AND BODY MASS INDEX (BMI) OF 36.0 TO 36.9 IN ADULT (H): Primary | ICD-10-CM

## 2025-03-04 ASSESSMENT — PAIN SCALES - GENERAL: PAINLEVEL_OUTOF10: NO PAIN (0)

## 2025-03-04 NOTE — PROGRESS NOTES
Medication Therapy Management (MTM) Encounter    ASSESSMENT:                            Medication Adherence/Access: No issues identified.    Weight Management:   Due to [facility] insurance discontinuing coverage of GLP1 agonists for Weight Management in 2025 year, much of today's discussion was spent discussing next steps including alternative injectable options - paying out of pocket w/ savings card cost, compound product through [facility], and others. From this, patient wishing to transition to compound semaglutide through [facility] after current supply of Wegovy is out.  She is also wishing to increase to 1.7 mg for now, so will send in RX for this to [facility].     PLAN:                            Transition from Wegovy to compound semaglutide and increase to 1.7 mg (34 units) once weekly for now  As of now compound semaglutide is available through [employer] until 4/22/2025 or unless caught up in legality issues   From there can look at alternative/next steps if needed.     Follow-up: Return in about 3 months (around 6/4/2025).    SUBJECTIVE/OBJECTIVE:                          Jacqui Grijalva is a 59 year old female seen for a follow-up visit.       Reason for visit: Weight Management follow up.    Allergies/ADRs: Reviewed in chart  Past Medical History: Reviewed in chart  Tobacco: She reports that she has never smoked. She has never used smokeless tobacco.  Alcohol: not currently using    Medication Adherence/Access: no issues reported.    Weight Management   Wegovy 1 mg weekly     Follows Wendy Gonzalez PA-C for [facility].  Working with Meri Greenberg, health . She did extend dosing to 10 days X 2 and felt more hungry when she did this. So for the last 2 doses she has been taking every 7 days and feels hunger is better. Has 3 injections left of Wegovy. She would like to consider increasing dose of  "Wegovy. She is losing < 1 lb per week. Tolerating Wegovy well, minor nausea. Gas at times. Headaches at times, manageable at this point. Unsure if related to Wegovy.    Nutrition/Eating Habits: working with dietitian, Ann Maire Bermudez. She feels she \"totally changed\" her diet. Eating 3 meals per day. Getting in good protein/fiber with each meal.   Exercise/Activity: Ankle fusion surgery 12/2023. In summer she achieved her goals of walkign 9 hold golf course and then [place] Now doing exercise classes 2-3 days per week.     Initial Consult Weight: 294 lb      Current Weight: 250 lb   Cumulative Weight Loss: -44 lb, -15%    Wt Readings from Last 4 Encounters:   02/19/25 254 lb 1.6 oz (115.3 kg)   01/14/25 255 lb (115.7 kg)   01/13/25 255 lb (115.7 kg)   11/25/24 262 lb (118.8 kg)     Estimated body mass index is 35.46 kg/m  as calculated from the following:    Height as of 2/19/25: 5' 10.98\" (1.803 m).    Weight as of 2/19/25: 254 lb 1.6 oz (115.3 kg).  ----------------    I spent 25 minutes with this patient today. All changes were made via collaborative practice agreement with Wendy Gonzalez PA-C.     A summary of these recommendations available via clinic portal.     Lauren Bloch, PharmD, BCACP   Medication Therapy Management Pharmacist   [facility]    Telemedicine Visit Details  The patient's medications can be safely assessed via a telemedicine encounter.  Type of service:  Telephone visit  Originating Location (pt. Location): Home    Distant Location (provider location):  Off-site  Start Time:  3:10 PM  End Time:  3:35 PM     Medication Therapy Recommendations  Class 2 severe obesity with serious comorbidity in adult (H)   1 Current Medication: COMPOUNDED NON-CONTROLLED SUBSTANCE (CMPD RX) - PHARMACY TO MIX COMPOUNDED MEDICATION   Current Medication Sig: Compound semaglutide 1.7 mg (34 units) once weekly. OK to compound during Wegovy " shortage.   Rationale: Dose too low - Dosage too low - Effectiveness   Recommendation: Increase Dose - COMPOUNDED NON-CONTROLLED SUBSTANCE - PHARMACY TO MIX COMPOUNDED MEDICATION   Status: Accepted per CPA   Identified Date: 3/4/2025 Completed Date: 3/4/2025

## 2025-03-10 NOTE — PROGRESS NOTES
"Video-Visit Details    Type of service:  Video Visit    Video Start Time: 9:23 am  Video End Time: 9:50 am    Originating Location (pt. Location): Home    Distant Location (provider location):  Offsite (providers home) [facility] [place]     Platform used for Video Visit: COTA      Weight Management Nutrition Consultation    Jacqui Grijalva is a 59 year old female presents today for return weight management nutrition consultation.  Patient referred by Wendy Gonzalez PA-C on May 30, 2024.    Patient with Co-morbidities of obesity includin/29/2024     3:41 PM   --   I have the following health issues associated with obesity Sleep Apnea    GERD (Reflux)    Stress Incontinence    Osteoarthritis (joint disease)   I have the following symptoms associated with obesity Knee Pain    Fatigue     MAFLD     Anthropometrics:  24: 133.4 kg (294 lb).    Tanita Scale:  Date: 24  BW: 272 lbs   Fat%: 46.9% (desirable range: 23--33.9%) - down 4% from May  Muscle%: 50.4%  Muscle Mass: 137.2 lbs - Up 1 lb from May  Visceral Fat Ratin (optimal less than 13) - Down two points since May  BMR: 2038 kcal    Estimated body mass index is 34.6 kg/m  as calculated from the following:    Height as of 25: 1.803 m (5' 10.98\").    Weight as of this encounter: 112.5 kg (248 lb).     Current weight: 248 lbs    Medications for Weight Loss:  Wegovy with plan to switch to compounded semaglutide once out    NUTRITION HISTORY  Food allergies: None  Food intolerances: spicy foods   Vitamin/Mineral Supplements: MVI, calcium and collagen    Previous methods of diet modification for weight loss: In last 6 months, started tracking intermittently in [employer] mariela. Identifies needing to moderate carb intake.   Working 8-4, M-F  Doesn't love to cook  Tries not to drink calories  Identifies over eating at night. Comfort eating - stress management, reward.   Pt goals - decrease carb intake, decrease binge " eating, healthier swaps for snacking     Please see previous RD notes for more information     10/31/24 - Started Wegovy 5 weeks ago, mild headache and nausea. Lost 6 lbs initially.  Has been at a plateau of 268 lbs for last 4 weeks.     January- Continues Wegovy 1 mg. Some mild constipation, nausea.   Stopped nutritional tracking in Dec. Doing well with maintaining healthful diet change. Would like to discuss strategies for continued calorie deficit without tracking.     Physical Activity:  Exercise: 210-315 mins per week - PT for ankle, biking, walking, golfing, [employer] exercises, little of weight lifting.    Class Pass class three times weekly.     March 2025:  Continuing to lose weight but hoping to speed up her rate of weight loss.     Hydration: no concerns, mostly water. Low kcal caffeine in morning. Alcohol: maybe 2 drinks/week    PA: doing 3 hrs/week of cardiovascular and strength training   - Will be adding in golfing come Spring 2x/week.     Progress Towards Previous Goals:  1) At meals, use the following serving recommendations:    - 1 Palm-size portion of lean protein   - 1-2 servings starch (whole grains and starchy veggies)   - 2 servings veggies and/or fruit   - 1-2 servings fat   - See The Plate Method below for guidance on portion sizes - Following this most of the time. Less likely to follow if experiencing more hunger or if out of routing (holiday, traveling, etc).   2) Keep up the excellent work with exercise! - Doing 3 hours/week of cardiovascular and weight lifting.      Nutrition Prescription  Recommended energy/nutrient modification.    Nutrition Diagnosis  Obesity r/t long history of positive energy balance aeb BMI >30. - Improving    Nutrition Intervention  Materials/education provided on hypocaloric diet for weight loss.   Reviewed progress towards previous goals. Praised pt on the progress she has made.   Discussed strategies for portion control and lower calorie food choices to  maintain calor deficit and progress weight loss.   Co-developed goals to work towards.   Provided pt with list of goals and resources below via "[employer]".     Expected Engagement: good    Nutrition Goals  Start tracking again  Aim to improve protein and fiber intake   Aim to have ready to go high protein snacks     Macronutrient goals:    25 g fiber  200 g carbohydrates   55 g fat  85 g protin    Protein ideas that are different than your typical:  - cottage cheese  - roasted chickpeas   - nutritional yeast   - hard boiled eggs   - deli meat     The Plate Method: [email or web address]    High Fiber Foods:  Bran cereal, 1/3 cup, 8.6 gm fiber   Cooked kidney beans   cup 7.9 gm  Cooked lentils   cup 7.8 gm  Cooked black beans   cup 7.6 gm  Canned chickpeas   cup 5.3 gm  Baked beans   cup 5.2 gm  Pear 1 5.1 gm  Soybeans   cup 5.1 gm  Quinoa   cup 5 gm  Silver seeds, 1 tbsp 5 gm  Baked sweet potato, with skin 1 medium 4.8 gm  Avocado, half small 4.5 gm  Baked potato, with skin 1 medium 4.4 gm  Cooked frozen green peas   cup 4.4 gm  Bulgur   cup 4.1 gm  Cooked frozen mixed vegetables   cup 4 gm  Edamame   cup 4 gm  Raspberries   cup 4 gm  Blackberries   cup 3.8 gm  Almonds 1 oz 3.5 gm  Cooked frozen spinach   cup 3.5 gm  Vegetable or soy ciara 1 each 3.4 gm  Apple 1 medium 3.3 gm  Dried dates 5 pieces 3.3 gm       Follow-Up:  Friday, May 16th at 1:00 pm    Time spent with patient: 27 minutes.  CANDY Perez, RD, LD  Clinic #: [phone]

## 2025-03-11 NOTE — PATIENT INSTRUCTIONS
"Recommendations from today's MTM visit:                                                       Transition from Wegovy to compound semaglutide and increase to 1.7 mg (34 units) once weekly for now  As of now compound semaglutide is available through [employer] until 4/22/2025 or unless caught up in legality issues   From there can look at alternative/next steps if needed.     Follow-up: Return in about 3 months (around 6/4/2025).    It was great speaking with you today.  I value your experience and would be very thankful for your time in providing feedback in our clinic survey. In the next few days, you may receive an email or text message from [facility] [employer] with a link to a survey related to your  clinical pharmacist.\"     To schedule another MTM appointment, please call the clinic directly or you may call the MTM scheduling line at [phone] or toll-free at [phone].     My Clinical Pharmacist's contact information:                                                      Please feel free to contact me with any questions or concerns you have.      Lauren Bloch, PharmD  Medication Therapy Management Pharmacist   [facility] [place]             "

## 2025-03-17 ENCOUNTER — VIRTUAL VISIT (OUTPATIENT)
Dept: ENDOCRINOLOGY | Facility: CLINIC | Age: 60
End: 2025-03-17
Payer: COMMERCIAL

## 2025-03-17 VITALS — WEIGHT: 248 LBS | BODY MASS INDEX: 34.6 KG/M2

## 2025-03-17 DIAGNOSIS — Z71.3 NUTRITIONAL COUNSELING: Primary | ICD-10-CM

## 2025-03-17 DIAGNOSIS — E66.9 OBESITY: ICD-10-CM

## 2025-03-17 PROCEDURE — 99207 PR NO CHARGE LOS: CPT | Mod: 95 | Performed by: DIETITIAN, REGISTERED

## 2025-03-17 PROCEDURE — 97803 MED NUTRITION INDIV SUBSEQ: CPT | Mod: 95 | Performed by: DIETITIAN, REGISTERED

## 2025-03-17 NOTE — NURSING NOTE
Is the patient currently in the state of [place]? YES    Location: work    Visit mode:VIDEO    If the visit is dropped, the patient can be reconnected by: VIDEO VISIT: Text to cell phone:   Telephone Information:   Mobile [phone]    and VIDEO VISIT: Send to e-mail at: [email or web address]    Will anyone else be joining the visit? NO  (If patient encounters technical issues they should call [phone]    Are changes needed to the allergy or medication list? No    Are refills needed on medications prescribed by this physician? NO    Reason for visit: GITA Armenta, Virtual Visit Facilitator    QNR Status: NA

## 2025-03-17 NOTE — PATIENT INSTRUCTIONS
Nutrition Goals  Start tracking again  Aim to improve protein and fiber intake   Aim to have ready to go high protein snacks     Macronutrient goals:    25 g fiber  200 g carbohydrates   55 g fat  85 g protin    Protein ideas that are different than your typical:  - cottage cheese  - roasted chickpeas   - nutritional yeast   - hard boiled eggs   - deli meat     The Plate Method: [email or web address]    High Fiber Foods:  Bran cereal, 1/3 cup, 8.6 gm fiber   Cooked kidney beans   cup 7.9 gm  Cooked lentils   cup 7.8 gm  Cooked black beans   cup 7.6 gm  Canned chickpeas   cup 5.3 gm  Baked beans   cup 5.2 gm  Pear 1 5.1 gm  Soybeans   cup 5.1 gm  Quinoa   cup 5 gm  Silver seeds, 1 tbsp 5 gm  Baked sweet potato, with skin 1 medium 4.8 gm  Avocado, half small 4.5 gm  Baked potato, with skin 1 medium 4.4 gm  Cooked frozen green peas   cup 4.4 gm  Bulgur   cup 4.1 gm  Cooked frozen mixed vegetables   cup 4 gm  Edamame   cup 4 gm  Raspberries   cup 4 gm  Blackberries   cup 3.8 gm  Almonds 1 oz 3.5 gm  Cooked frozen spinach   cup 3.5 gm  Vegetable or soy ciara 1 each 3.4 gm  Apple 1 medium 3.3 gm  Dried dates 5 pieces 3.3 gm       Follow-Up:  Friday, May 16th at 1:00 pm    CANDY Perez, RD, LD  Clinic #: [phone]

## 2025-04-17 ENCOUNTER — ANCILLARY PROCEDURE (OUTPATIENT)
Dept: MRI IMAGING | Facility: CLINIC | Age: 60
End: 2025-04-17
Attending: FAMILY MEDICINE
Payer: COMMERCIAL

## 2025-04-17 DIAGNOSIS — M25.562 ACUTE PAIN OF LEFT KNEE: ICD-10-CM

## 2025-04-17 PROCEDURE — 73721 MRI JNT OF LWR EXTRE W/O DYE: CPT | Mod: LT

## 2025-04-17 PROCEDURE — 73721 MRI JNT OF LWR EXTRE W/O DYE: CPT | Mod: 26 | Performed by: RADIOLOGY

## 2025-04-21 ENCOUNTER — ALLIED HEALTH/NURSE VISIT (OUTPATIENT)
Dept: SURGERY | Facility: CLINIC | Age: 60
End: 2025-04-21
Payer: COMMERCIAL

## 2025-04-21 ENCOUNTER — OFFICE VISIT (OUTPATIENT)
Dept: ORTHOPEDICS | Facility: CLINIC | Age: 60
End: 2025-04-21
Attending: FAMILY MEDICINE
Payer: COMMERCIAL

## 2025-04-21 ENCOUNTER — THERAPY VISIT (OUTPATIENT)
Dept: PHYSICAL THERAPY | Facility: CLINIC | Age: 60
End: 2025-04-21
Payer: COMMERCIAL

## 2025-04-21 VITALS
DIASTOLIC BLOOD PRESSURE: 76 MMHG | WEIGHT: 248.4 LBS | OXYGEN SATURATION: 99 % | BODY MASS INDEX: 34.77 KG/M2 | SYSTOLIC BLOOD PRESSURE: 128 MMHG | HEART RATE: 84 BPM | HEIGHT: 71 IN

## 2025-04-21 DIAGNOSIS — M25.562 ACUTE PAIN OF LEFT KNEE: ICD-10-CM

## 2025-04-21 DIAGNOSIS — M17.11 LOCALIZED OSTEOARTHRITIS OF RIGHT KNEE: ICD-10-CM

## 2025-04-21 DIAGNOSIS — M17.11 LOCALIZED OSTEOARTHRITIS OF RIGHT KNEE: Primary | ICD-10-CM

## 2025-04-21 DIAGNOSIS — E66.01: Primary | ICD-10-CM

## 2025-04-21 DIAGNOSIS — E66.812: Primary | ICD-10-CM

## 2025-04-21 PROCEDURE — 99213 OFFICE O/P EST LOW 20 MIN: CPT | Performed by: FAMILY MEDICINE

## 2025-04-21 PROCEDURE — 97110 THERAPEUTIC EXERCISES: CPT | Mod: GP

## 2025-04-21 PROCEDURE — 97161 PT EVAL LOW COMPLEX 20 MIN: CPT | Mod: GP

## 2025-04-21 ASSESSMENT — PAIN SCALES - GENERAL: PAINLEVEL_OUTOF10: NO PAIN (0)

## 2025-04-21 NOTE — PROGRESS NOTES
PHYSICAL THERAPY EVALUATION  Type of Visit: Evaluation       Fall Risk Screen:  Have you fallen 2 or more times in the past year?: No  Have you fallen and had an injury in the past year?: No  Is patient receiving Physical Therapy Services?: No    Subjective         Presenting condition or subjective complaint:   Pt is complaining of left knee pain that began 3 months ago after an [employer] class.  This was her first class, she thinks it was overdoing it on the rowing machine. She has the most pain with walking and stairs.  The pain has not changed since the initially injury and feels sharp with weight bearing.  She is able to maintain function.  She will be having bilateral steroid injections in May.    Date of onset: 01/21/25    Relevant medical history:     Dates & types of surgery:      Prior diagnostic imaging/testing results:       Prior therapy history for the same diagnosis, illness or injury:        Prior Level of Function  Transfers: Independent  Ambulation: Independent  ADL: Independent  IADL:  IND    Living Environment  Social support:     Type of home:   condo  Stairs to enter the home:       no stairs  Ramp:     Stairs inside the home:         Help at home:   lives with daughter  Equipment owned:       Employment:     works at [employer]  Hobbies/Interests:   golf, exercise, hiking, biking    Patient goals for therapy:   to have less knee pain with walking and exercise    Pain assessment: 4/10 at worst, but it is constant.  Worst in morning and end of day.     Objective      Cognitive Status Examination  Orientation: Oriented to person, place and time   Level of Consciousness: Alert  Follows Commands and Answers Questions: 100% of the time  Personal Safety and Judgement: Intact  Memory: Intact    OBSERVATION: Pleasant female in NAD.  INTEGUMENTARY: Intact  POSTURE: Sitting Posture: Rounded shoulders  PALPATION: no ttp    RANGE OF MOTION:   (Degrees) Left AROM Left PROM  Right AROM Right PROM    Knee Flexion 125*  125    Knee Extension 3*  3    Pain: end range left knee  End feel: tissue stretch    STRENGTH:   Pain: - none + mild ++ moderate +++ severe  Strength Scale: 0-5/5 Left Right   Knee Flexion 5 5   Knee Extension 5 5   Quad Set Good Good     GAIT:   Level of Porter: Independent  Assistive Device(s): None  Gait Deviations: Antalgic, mild  Gait Distance: 150 ft  Stairs: nt      Assessment & Plan   CLINICAL IMPRESSIONS  Medical Diagnosis: Localized osteoarthritis of right knee (M17.11), Acute pain of left knee (M25.562)    Treatment Diagnosis: Acute left knee pain with impaired range of motion   Impression/Assessment: Patient is a 60 year old female with bilateral knee complaints, L>R.  The following significant findings have been identified: Pain, Decreased ROM/flexibility, Impaired gait, Impaired muscle performance, and Decreased activity tolerance. These impairments interfere with their ability to perform self care tasks, recreational activities, and community mobility as compared to previous level of function.     Clinical Decision Making (Complexity):  Clinical Presentation: Stable/Uncomplicated  Clinical Presentation Rationale: based on medical and personal factors listed in PT evaluation  Clinical Decision Making (Complexity): Low complexity    PLAN OF CARE  Treatment Interventions:  Interventions: Manual Therapy, Neuromuscular Re-education, Therapeutic Activity, Therapeutic Exercise    Long Term Goals     PT Goal 1  Goal Identifier: Activity tolernace  Goal Description: Pt will be able to ambulate and participate in desire exercise classes without exacerbation of knee pain over 2/10 for improved QOL.  Goal Progress: ongoing  Target Date: 06/21/25      Frequency of Treatment: 1x/week, taper to 2x/month  Duration of Treatment: 2 months    Recommended Referrals to Other Professionals:     Education Assessment:   Learner/Method: Patient  Education Comments: HEP, POC    Risks and benefits of  evaluation/treatment have been explained.   Patient/Family/caregiver agrees with Plan of Care.     Evaluation Time:     PT Margo, Low Complexity Minutes (31900): 15       Signing Clinician: Joaquin Wong PT

## 2025-04-21 NOTE — LETTER
4/21/2025      RE: Jacqui Grijalva  [address]  [place] [address]     Dear Colleague,    Thank you for referring your patient, Jacqui Grijalva, to the [facility]. Please see a copy of my visit note below.      [facility]  SPORTS & ORTHOPEDIC CLINIC VISIT     Apr 21, 2025        ASSESSMENT & PLAN    60-year-old with acute medial left knee pain that is likely due to radial tear in the meniscus seen on MRI in the setting of mild chondromalacia in the medial compartment and other multidirectional/degenerative tearing of the medial meniscus.    Reviewed imaging and assessment with patient in detail  Discussed option for treatment which we could include continued conservative management with the addition of physical therapy and referral was provided.  She additionally would be interested in a steroid injection for her knee and prefer to have this done under ultrasound guidance.  She will be assisted with scheduling.  We also briefly reviewed her right knee and right knee symptoms.  Pain is mostly medial as well as anterior.  Reviewed previous x-rays which demonstrate some narrowing in the medial compartment.  Would be appropriate for her to treat with PT and steroid injection for this knee as well.    Hal Betancourt MD  [facility]    -----  Chief Complaint   Patient presents with     Left Knee - Follow Up       SUBJECTIVE  Jacqui Grijalva is a/an 60 year old female who is seen for follow up of left knee pain. Here today for MRI results.     The patient is seen by themselves.    Date of injury: End of January 2025  Date of Last Visit: 2/11/25   Symptoms: no change  Worsened by: Bending, vigorous activity, walking   Better with: Tylenol and ice   Treatment to date: ice and Tylenol  Associated symptoms: no distal numbness or tingling; denies swelling or warmth        REVIEW OF SYSTEMS:  See  HPI     OBJECTIVE:  LMP 08/01/2016 (Approximate)      No exam this visit    RADIOLOGY:    MRI of the left knee dated 4/17/2025.  Per radiology report:  Impression:  1. Multidirectional tear of the medial meniscus body and posterior  horn with area of radial tear component in the posterior horn.   2. Grade 4 patellofemoral, grade II medial compartment chondromalacia.  3. Query patellofemoral maltracking with borderline elevated TT:TG (16  mm), lateral patellar subluxation and tilting.           Again, thank you for allowing me to participate in the care of your patient.      Sincerely,    Hal Betancourt MD

## 2025-04-21 NOTE — PROGRESS NOTES
[facility]  SPORTS & ORTHOPEDIC CLINIC VISIT     Apr 21, 2025        ASSESSMENT & PLAN    60-year-old with acute medial left knee pain that is likely due to radial tear in the meniscus seen on MRI in the setting of mild chondromalacia in the medial compartment and other multidirectional/degenerative tearing of the medial meniscus.    Reviewed imaging and assessment with patient in detail  Discussed option for treatment which we could include continued conservative management with the addition of physical therapy and referral was provided.  She additionally would be interested in a steroid injection for her knee and prefer to have this done under ultrasound guidance.  She will be assisted with scheduling.  We also briefly reviewed her right knee and right knee symptoms.  Pain is mostly medial as well as anterior.  Reviewed previous x-rays which demonstrate some narrowing in the medial compartment.  Would be appropriate for her to treat with PT and steroid injection for this knee as well.    Hal Betancourt MD  [facility] SPORTS MEDIC[facility]    -----  Chief Complaint   Patient presents with    Left Knee - Follow Up       SUBJECTIVE  Jacqui Grijalva is a/an 60 year old female who is seen for follow up of left knee pain. Here today for MRI results.     The patient is seen by themselves.    Date of injury: End of January 2025  Date of Last Visit: 2/11/25   Symptoms: no change  Worsened by: Bending, vigorous activity, walking   Better with: Tylenol and ice   Treatment to date: ice and Tylenol  Associated symptoms: no distal numbness or tingling; denies swelling or warmth        REVIEW OF SYSTEMS:  See HPI     OBJECTIVE:  LMP 08/01/2016 (Approximate)      No exam this visit    RADIOLOGY:    MRI of the left knee dated 4/17/2025.  Per radiology report:  Impression:  1. Multidirectional tear of the medial meniscus body and posterior  horn with area of radial tear component in the  posterior horn.   2. Grade 4 patellofemoral, grade II medial compartment chondromalacia.  3. Query patellofemoral maltracking with borderline elevated TT:TG (16  mm), lateral patellar subluxation and tilting.

## 2025-04-21 NOTE — NURSING NOTE
"(   Chief Complaint   Patient presents with    Nurse Visit     Tanita weight check    )    ( Weight: 112.7 kg (248 lb 6.4 oz) )  ( Height: 180.3 cm (5' 11\") )  ( BMI (Calculated): 34.64 )  (   )  ( Cumulative weight loss (lbs): 45.6 )  ( Last Visits Weight: 112.5 kg (248 lb) )  ( Wt change since last visit (lbs): 0.4 )  ( Waist Circumference (cm): 99 cm )  (   )    ( BP: 128/76 )  (   )  (   )  (   )  ( Pulse: 84 )  (   )  ( SpO2: 99 % )    (   Patient Active Problem List   Diagnosis    Tear film insufficiency    Chronic allergic conjunctivitis - Both Eyes    Post-traumatic osteoarthritis of right knee    Ankle pain, left    Adjustment disorder with anxious mood    Melanoma of skin (H)    Neoplasm of uncertain behavior of skin of chest    Basal cell carcinoma (BCC) of left thigh    GERD (gastroesophageal reflux disease)    History of melanoma    Scar irritation    Inflamed acrochordon    Class 2 severe obesity with serious comorbidity in adult (H)    Obstructive sleep apnea syndrome    Metabolic dysfunction-associated steatotic liver disease (MASLD)    )  (   Current Outpatient Medications   Medication Sig Dispense Refill    acetaminophen (TYLENOL) 500 MG tablet Take 500-1,000 mg by mouth every 6 hours as needed for mild pain      COMPOUNDED NON-CONTROLLED SUBSTANCE (CMPD RX) - PHARMACY TO MIX COMPOUNDED MEDICATION Compound semaglutide 1.7 mg (34 units) once weekly. OK to compound during Wegovy shortage. 2 mL 2    diclofenac (VOLTAREN) 1 % topical gel Apply 4 grams to knees up to four times daily using enclosed dosing card. 100 g 1    ibuprofen (ADVIL/MOTRIN) 200 MG tablet Take 2-3 tablets by mouth every 6 hours as needed.      melatonin 5 MG tablet Take 5 mg by mouth nightly as needed for sleep      multivitamin w/minerals (THERA-VIT-M) tablet Take 1 tablet by mouth daily      triamcinolone (KENALOG) 0.1 % external ointment Apply topically 2 times daily. 160 g 5    valACYclovir (VALTREX) 1000 mg tablet Take 2 " tablets (2,000 mg) by mouth 2 times daily. 4 tablet 4    calcium carbonate-vitamin D (CALTRATE) 600-10 MG-MCG per tablet Take 1 tablet by mouth daily.      docusate sodium (COLACE) 100 MG capsule Take 100 mg by mouth 2 times daily as needed for constipation.      Semaglutide-Weight Management (WEGOVY) 1 MG/0.5ML pen Inject 1 mg subcutaneously once a week. 6 mL 0    )  ( Diabetes Eval:    )    ( Pain Eval:  No Pain (0) )    ( Wound Eval:       )    (   History   Smoking Status    Never   Smokeless Tobacco    Never    )    ( Signed By:  Viviane Fuentes, EMT April 21, 2025; 8:56 AM )

## 2025-05-05 ENCOUNTER — OFFICE VISIT (OUTPATIENT)
Dept: ORTHOPEDICS | Facility: CLINIC | Age: 60
End: 2025-05-05
Payer: COMMERCIAL

## 2025-05-05 DIAGNOSIS — M25.562 ACUTE PAIN OF LEFT KNEE: ICD-10-CM

## 2025-05-05 DIAGNOSIS — M17.11 LOCALIZED OSTEOARTHRITIS OF RIGHT KNEE: Primary | ICD-10-CM

## 2025-05-05 PROCEDURE — 20611 DRAIN/INJ JOINT/BURSA W/US: CPT | Mod: 50 | Performed by: FAMILY MEDICINE

## 2025-05-05 RX ORDER — LIDOCAINE HYDROCHLORIDE 10 MG/ML
2 INJECTION, SOLUTION EPIDURAL; INFILTRATION; INTRACAUDAL; PERINEURAL
Status: COMPLETED | OUTPATIENT
Start: 2025-05-05 | End: 2025-05-05

## 2025-05-05 RX ORDER — TRIAMCINOLONE ACETONIDE 40 MG/ML
40 INJECTION, SUSPENSION INTRA-ARTICULAR; INTRAMUSCULAR
Status: COMPLETED | OUTPATIENT
Start: 2025-05-05 | End: 2025-05-05

## 2025-05-05 RX ORDER — LIDOCAINE HYDROCHLORIDE 10 MG/ML
3 INJECTION, SOLUTION EPIDURAL; INFILTRATION; INTRACAUDAL; PERINEURAL
Status: COMPLETED | OUTPATIENT
Start: 2025-05-05 | End: 2025-05-05

## 2025-05-05 RX ADMIN — LIDOCAINE HYDROCHLORIDE 3 ML: 10 INJECTION, SOLUTION EPIDURAL; INFILTRATION; INTRACAUDAL; PERINEURAL at 07:44

## 2025-05-05 RX ADMIN — TRIAMCINOLONE ACETONIDE 40 MG: 40 INJECTION, SUSPENSION INTRA-ARTICULAR; INTRAMUSCULAR at 07:44

## 2025-05-05 RX ADMIN — LIDOCAINE HYDROCHLORIDE 2 ML: 10 INJECTION, SOLUTION EPIDURAL; INFILTRATION; INTRACAUDAL; PERINEURAL at 07:44

## 2025-05-05 NOTE — PROGRESS NOTES
[facility]  SPORTS & ORTHOPEDIC CLINIC VISIT     May 5, 2025      USG bilateral knee injection    Date/Time: 5/5/2025 7:44 AM    Performed by: Hal Betancourt MD  Authorized by: Hal Betancourt MD    Indications:  Osteoarthritis  Needle Size:  22 G  Guidance: ultrasound    Approach:  Lateral  Location:  Knee  Laterality:  Bilateral      Medications (Right):  40 mg triamcinolone 40 MG/ML; 3 mL lidocaine (PF) 1 %; 2 mL lidocaine (PF) 1 %  Medications (Left):  40 mg triamcinolone 40 MG/ML; 2 mL lidocaine (PF) 1 %; 3 mL lidocaine (PF) 1 %  Outcome:  Tolerated well, no immediate complications  Procedure discussed: discussed risks, benefits, and alternatives    Consent Given by:  Patient  Timeout: timeout called immediately prior to procedure    Prep: patient was prepped and draped in usual sterile fashion     PROCEDURE: Ultrasound-guided bilateral knee injection   The patient was apprised of the risks and the benefits of the procedure written consent was signed by the patient.   The patient was positioned lying supine on exam table with knee resting in a slightly flexed position.   The area of the superiolateral left knee was cleaned with a chlorhexadine swab.  Ultrasound was necessary to localized the joint space due to need for accurate placement of medication and OA.  Using sterile technique the skin was anesthetized with 1% lidocaine, a 22-gauge 1.5 in needle was introduced into the suprapatellar pouch under direct and continuous ultrasound guidance.  A solution of 40 mg of triamcinolone along with 2 mL of 1% lidocaine was injected easily and seen flowing into the joint space on ultrasound.  Images were captured and saved to the permanent record.  The identical procedure was repeated on the right side.  There were no complications. The patient tolerated the procedure well. There was minimal bleeding.   The patient was instructed to ice and provided gentle compression to the  knee upon leaving clinic and refrain from overuse over the next 2 days.   The patient was instructed to go to the emergency room with any unusual pain, swelling, or redness occurred in the injected area.     Hal Betancourt MD

## 2025-05-05 NOTE — LETTER
5/5/2025      RE: Jacqui Grijalva  [address]  [place] [address]     Dear Colleague,    Thank you for referring your patient, Jacqui Grijalva, to the [facility] SPORTS MEDICINE [facility]INIC [place]. Please see a copy of my visit note below.    [facility]  SPORTS & ORTHOPEDIC CLINIC VISIT     May 5, 2025      USG bilateral knee injection    Date/Time: 5/5/2025 7:44 AM    Performed by: Hal Betancourt MD  Authorized by: Hal Betancourt MD    Indications:  Osteoarthritis  Needle Size:  22 G  Guidance: ultrasound    Approach:  Lateral  Location:  Knee  Laterality:  Bilateral      Medications (Right):  40 mg triamcinolone 40 MG/ML; 3 mL lidocaine (PF) 1 %; 2 mL lidocaine (PF) 1 %  Medications (Left):  40 mg triamcinolone 40 MG/ML; 2 mL lidocaine (PF) 1 %; 3 mL lidocaine (PF) 1 %  Outcome:  Tolerated well, no immediate complications  Procedure discussed: discussed risks, benefits, and alternatives    Consent Given by:  Patient  Timeout: timeout called immediately prior to procedure    Prep: patient was prepped and draped in usual sterile fashion     PROCEDURE: Ultrasound-guided bilateral knee injection   The patient was apprised of the risks and the benefits of the procedure written consent was signed by the patient.   The patient was positioned lying supine on exam table with knee resting in a slightly flexed position.   The area of the superiolateral left knee was cleaned with a chlorhexadine swab.  Ultrasound was necessary to localized the joint space due to need for accurate placement of medication and OA.  Using sterile technique the skin was anesthetized with 1% lidocaine, a 22-gauge 1.5 in needle was introduced into the suprapatellar pouch under direct and continuous ultrasound guidance.  A solution of 40 mg of triamcinolone along with 2 mL of 1% lidocaine was injected easily and seen flowing into the joint space on ultrasound.  Images were  captured and saved to the permanent record.  The identical procedure was repeated on the right side.  There were no complications. The patient tolerated the procedure well. There was minimal bleeding.   The patient was instructed to ice and provided gentle compression to the knee upon leaving clinic and refrain from overuse over the next 2 days.   The patient was instructed to go to the emergency room with any unusual pain, swelling, or redness occurred in the injected area.     Hal Betancourt MD          Again, thank you for allowing me to participate in the care of your patient.      Sincerely,    Hal Betancourt MD

## 2025-05-05 NOTE — NURSING NOTE
[address] [address]  Dept: [phone]  ______________________________________________________________________________    Patient: Jacqui Grijalva   : 1965   MRN: [id number]   May 5, 2025    INVASIVE PROCEDURE SAFETY CHECKLIST    Date: May 5, 2025   Procedure:USG Bilateral Knee CSI   Patient Name: Jacqui Grijalva  MRN: [id number]  YOB: 1965    Action: Complete sections as appropriate. Any discrepancy results in a HARD COPY until resolved.     PRE PROCEDURE:  Patient ID verified with 2 identifiers (name and  or MRN): Yes  Procedure and site verified with patient/designee (when able): Yes  Accurate consent documentation in medical record: Yes  H&P (or appropriate assessment) documented in medical record: Yes  H&P must be up to 20 days prior to procedure and updates within 24 hours of procedure as applicable: Yes  Relevant diagnostic and radiology test results appropriately labeled and displayed as applicable: Yes  Procedure site(s) marked with provider initials: NA    TIMEOUT:  Time-Out performed immediately prior to starting procedure, including verbal and active participation of all team members addressing the following:Yes  * Correct patient identify  * Confirmed that the correct side and site are marked  * An accurate procedure consent form  * Agreement on the procedure to be done  * Correct patient position  * Relevant images and results are properly labeled and appropriately displayed  * The need to administer antibiotics or fluids for irrigation purposes during the procedure as applicable   * Safety precautions based on patient history or medication use    DURING PROCEDURE: Verification of correct person, site, and procedures any time the responsibility for care of the patient is transferred to another member of the care team.       Prior to injection, verified patient identity using patient's name and date of  birth.  Due to injection administration, patient instructed to remain in clinic for 15 minutes  afterwards, and to report any adverse reaction to me immediately.    Joint injection was performed.      Drug Amount Wasted:  None.  Vial/Syringe: Single dose vial  Expiration Date:  11/30/26 10/30/28      Silvia Carpenter, ATC  May 5, 2025

## 2025-05-29 ENCOUNTER — OFFICE VISIT (OUTPATIENT)
Dept: ENDOCRINOLOGY | Facility: CLINIC | Age: 60
End: 2025-05-29
Payer: COMMERCIAL

## 2025-05-29 ENCOUNTER — OFFICE VISIT (OUTPATIENT)
Dept: DERMATOLOGY | Facility: CLINIC | Age: 60
End: 2025-05-29
Payer: COMMERCIAL

## 2025-05-29 VITALS
SYSTOLIC BLOOD PRESSURE: 127 MMHG | HEIGHT: 71 IN | HEART RATE: 90 BPM | WEIGHT: 238.4 LBS | DIASTOLIC BLOOD PRESSURE: 86 MMHG | OXYGEN SATURATION: 99 % | BODY MASS INDEX: 33.38 KG/M2

## 2025-05-29 DIAGNOSIS — L82.0 SEBORRHEIC KERATOSES, INFLAMED: ICD-10-CM

## 2025-05-29 DIAGNOSIS — E66.811 CLASS 1 OBESITY WITH SERIOUS COMORBIDITY AND BODY MASS INDEX (BMI) OF 33.0 TO 33.9 IN ADULT, UNSPECIFIED OBESITY TYPE: Primary | ICD-10-CM

## 2025-05-29 DIAGNOSIS — Z85.828 HISTORY OF NONMELANOMA SKIN CANCER: Primary | ICD-10-CM

## 2025-05-29 DIAGNOSIS — Z85.820 HISTORY OF MELANOMA: ICD-10-CM

## 2025-05-29 PROCEDURE — 1126F AMNT PAIN NOTED NONE PRSNT: CPT | Performed by: DERMATOLOGY

## 2025-05-29 PROCEDURE — 17110 DESTRUCTION B9 LES UP TO 14: CPT | Performed by: DERMATOLOGY

## 2025-05-29 PROCEDURE — 99213 OFFICE O/P EST LOW 20 MIN: CPT | Mod: 25 | Performed by: DERMATOLOGY

## 2025-05-29 RX ORDER — SEMAGLUTIDE 2.4 MG/.75ML
2.4 INJECTION, SOLUTION SUBCUTANEOUS WEEKLY
Qty: 3 ML | Refills: 3 | Status: SHIPPED | OUTPATIENT
Start: 2025-05-29

## 2025-05-29 ASSESSMENT — PAIN SCALES - GENERAL
PAINLEVEL_OUTOF10: NO PAIN (0)
PAINLEVEL_OUTOF10: NO PAIN (0)

## 2025-05-29 NOTE — PROGRESS NOTES
Return Medical Weight Management Note     Jacqui Grijalva  MRN:  [id number]  :  1965  MERCEDES:  2025    Dear KAYLIE Latham CNP,    I had the pleasure of seeing your patient Jacqui Grijalva. She is a 60 year old female who I am continuing to see for treatment of obesity related to:        2024     3:41 PM   --   I have the following health issues associated with obesity Sleep Apnea    GERD (Reflux)    Stress Incontinence    Osteoarthritis (joint disease)   I have the following symptoms associated with obesity Knee Pain    Fatigue       Assessment & Plan   Problem List Items Addressed This Visit       Class 1 obesity with serious comorbidity and body mass index (BMI) of 33.0 to 33.9 in adult - Primary    Relevant Medications    Semaglutide-Weight Management (WEGOVY) 2.4 MG/0.75ML pen      Transition to Wegovy 2.4mg once weekly with cash pay coupon.   Bren Beebe RD next available   Wendy Agosto in 3 months       INTERVAL HISTORY:  [facility] on [facility]24. Referrals placed for health , health psychology and get moving program. Decided to hold off on AOMs. Really focused on life style changes - lost 17lbs. Continued to have increase hunger and weight stall.   Last seen by Carole Gou PA-C on 9/3/2024 and started on Wegovy through [facility] pharmacist.   [employer] employee. Starting BMI >40  Insurance no longer covered Wegovy, transitioned to Compounded Semaglutide       Is really happy with her results. Would like to continue to lose weight.     Anti-obesity medication history    Current:   Compounded Semaglutide 34units - taking every 10 days. Has 2 weeks left. No side effects. No constipation. Still helpful. Having some hunger by the end of the week.    Would like to transition to another GLP-1    Recent diet changes: has been tracking again - which has been helpful. 60g of protein on average. Eating 3 meals a day. Some snacks. Drinking lots of water. Coffee and celesuius in the morning.     Recent  exercise/activity changes: Golfing 2xweek, weight lifting 1-2xweek, andry.     Recent stressors: some increase stress, but tolerating well.     Recent sleep changes: has not been on consistent with CPAP. Continues to be seen by Sleep       CURRENT WEIGHT:   238 lbs 6.4 oz    Initial Weight (lbs): 294 lbs  Last Visits Weight: 112.7 kg (248 lb 6.4 oz)  Cumulative weight loss (lbs): 55.6  Weight Loss Percentage: 18.91%  Waist Circumference (cm): 97 cm    Wt Readings from Last 5 Encounters:   05/29/25 108.1 kg (238 lb 6.4 oz)   04/21/25 112.7 kg (248 lb 6.4 oz)   03/17/25 112.5 kg (248 lb)   02/19/25 115.3 kg (254 lb 1.6 oz)   01/14/25 115.7 kg (255 lb)             5/28/2025     5:17 PM   Changes and Difficulties   I have made the following changes to my diet since my last visit: Staying on course. Logging my meals right now.   With regards to my diet, I am still struggling with: Some hunger   I have made the following changes to my activity/exercise since my last visit: Exercising 4 times per week   With regards to my activity/exercise, I am still struggling with: Knee pain         MEDICATIONS:   Current Outpatient Medications   Medication Sig Dispense Refill    acetaminophen (TYLENOL) 500 MG tablet Take 500-1,000 mg by mouth every 6 hours as needed for mild pain      COMPOUNDED NON-CONTROLLED SUBSTANCE (CMPD RX) - PHARMACY TO MIX COMPOUNDED MEDICATION Compound semaglutide 1.7 mg (34 units) once weekly. OK to compound during Wegovy shortage. 2 mL 2    diclofenac (VOLTAREN) 1 % topical gel Apply 4 grams to knees up to four times daily using enclosed dosing card. 100 g 1    ibuprofen (ADVIL/MOTRIN) 200 MG tablet Take 2-3 tablets by mouth every 6 hours as needed.      multivitamin w/minerals (THERA-VIT-M) tablet Take 1 tablet by mouth daily      Semaglutide-Weight Management (WEGOVY) 2.4 MG/0.75ML pen Inject 2.4 mg subcutaneously once a week. 3 mL 3    valACYclovir (VALTREX) 1000 mg tablet Take 2 tablets (2,000 mg) by  "mouth 2 times daily. 4 tablet 4    calcium carbonate-vitamin D (CALTRATE) 600-10 MG-MCG per tablet Take 1 tablet by mouth daily.      docusate sodium (COLACE) 100 MG capsule Take 100 mg by mouth 2 times daily as needed for constipation.      melatonin 5 MG tablet Take 5 mg by mouth nightly as needed for sleep      Semaglutide-Weight Management (WEGOVY) 1 MG/0.5ML pen Inject 1 mg subcutaneously once a week. 6 mL 0    triamcinolone (KENALOG) 0.1 % external ointment Apply topically 2 times daily. 160 g 5           5/28/2025     5:17 PM   Weight Loss Medication History Reviewed With Patient   Which weight loss medications are you currently taking on a regular basis? Wegovy   Are you having any side effects from the weight loss medication that we have prescribed you? No       Objective    /86 (BP Location: Left arm, Patient Position: Sitting, Cuff Size: Adult Large)   Pulse 90   Ht 1.803 m (5' 11\")   Wt 108.1 kg (238 lb 6.4 oz)   LMP 08/01/2016 (Approximate)   SpO2 99%   BMI 33.25 kg/m        PHYSICAL EXAM:  GENERAL: alert and no distress  EYES: Eyes grossly normal to inspection.  No discharge or erythema, or obvious scleral/conjunctival abnormalities.  RESP: No audible wheeze, cough, or visible cyanosis.    SKIN: Visible skin clear. No significant rash, abnormal pigmentation or lesions.  NEURO: Cranial nerves grossly intact.  Mentation and speech appropriate for age.  PSYCH: Appropriate affect, tone, and pace of words        Sincerely,    Wendy Gonzalez PA-C      26 minutes spent by me on the date of the encounter doing chart review, history and exam, documentation and further activities per the note    The longitudinal plan of care for the diagnosis(es)/condition(s) as documented were addressed during this visit. Due to the added complexity in care, I will continue to support Jacqui in the subsequent management and with ongoing continuity of care.  "

## 2025-05-29 NOTE — NURSING NOTE
"(   Chief Complaint   Patient presents with    RECHECK     Return MW    )    ( Weight: 108.1 kg (238 lb 6.4 oz) )  ( Height: 180.3 cm (5' 11\") )  ( BMI (Calculated): 33.25 )  (   )  ( Cumulative weight loss (lbs): 55.6 )  ( Last Visits Weight: 112.7 kg (248 lb 6.4 oz) )  ( Wt change since last visit (lbs): -10 )  ( Waist Circumference (cm): 97 cm )  (   )    ( BP: 127/86 )  (   )  (   )  (   )  ( Pulse: 90 )  (   )  ( SpO2: 99 % )    (   Patient Active Problem List   Diagnosis    Tear film insufficiency    Chronic allergic conjunctivitis - Both Eyes    Post-traumatic osteoarthritis of right knee    Ankle pain, left    Adjustment disorder with anxious mood    Melanoma of skin (H)    Neoplasm of uncertain behavior of skin of chest    Basal cell carcinoma (BCC) of left thigh    GERD (gastroesophageal reflux disease)    History of melanoma    Scar irritation    Inflamed acrochordon    Class 2 severe obesity with serious comorbidity in adult (H)    Obstructive sleep apnea syndrome    Metabolic dysfunction-associated steatotic liver disease (MASLD)    )  (   Current Outpatient Medications   Medication Sig Dispense Refill    acetaminophen (TYLENOL) 500 MG tablet Take 500-1,000 mg by mouth every 6 hours as needed for mild pain      COMPOUNDED NON-CONTROLLED SUBSTANCE (CMPD RX) - PHARMACY TO MIX COMPOUNDED MEDICATION Compound semaglutide 1.7 mg (34 units) once weekly. OK to compound during Wegovy shortage. 2 mL 2    diclofenac (VOLTAREN) 1 % topical gel Apply 4 grams to knees up to four times daily using enclosed dosing card. 100 g 1    ibuprofen (ADVIL/MOTRIN) 200 MG tablet Take 2-3 tablets by mouth every 6 hours as needed.      multivitamin w/minerals (THERA-VIT-M) tablet Take 1 tablet by mouth daily      valACYclovir (VALTREX) 1000 mg tablet Take 2 tablets (2,000 mg) by mouth 2 times daily. 4 tablet 4    calcium carbonate-vitamin D (CALTRATE) 600-10 MG-MCG per tablet Take 1 tablet by mouth daily.      docusate sodium " (COLACE) 100 MG capsule Take 100 mg by mouth 2 times daily as needed for constipation.      melatonin 5 MG tablet Take 5 mg by mouth nightly as needed for sleep      Semaglutide-Weight Management (WEGOVY) 1 MG/0.5ML pen Inject 1 mg subcutaneously once a week. 6 mL 0    triamcinolone (KENALOG) 0.1 % external ointment Apply topically 2 times daily. 160 g 5    )  ( Diabetes Eval:    )    ( Pain Eval:  No Pain (0) )    ( Wound Eval:       )    (   History   Smoking Status    Never   Smokeless Tobacco    Never    )    ( Signed By:  Viviane Fuentes, EMT May 29, 2025; 4:22 PM )

## 2025-05-29 NOTE — PATIENT INSTRUCTIONS
Visit Plan:     Transition to Wegovy 2.4mg once weekly with cash pay coupon.   Bren Beebe RD next available   Wendy Agosto in 3 months

## 2025-05-29 NOTE — LETTER
2025       RE: Jacqui Grijalva  [address]  [place] [address]     Dear Colleague,    Thank you for referring your patient, Jacqui Grijalva, to the [facility] WEIGHT MANAGEMENT CLINIC [place] at [facility]. Please see a copy of my visit note below.      Return Medical Weight Management Note     Jacqui Grijalva  MRN:  [id number]  :  1965  MERCEDES:  2025    Dear Bijal Browne, KAYLIE FLETCHER,    I had the pleasure of seeing your patient Jacqui Grijalva. She is a 60 year old female who I am continuing to see for treatment of obesity related to:        2024     3:41 PM   --   I have the following health issues associated with obesity Sleep Apnea    GERD (Reflux)    Stress Incontinence    Osteoarthritis (joint disease)   I have the following symptoms associated with obesity Knee Pain    Fatigue       Assessment & Plan  Problem List Items Addressed This Visit       Class 1 obesity with serious comorbidity and body mass index (BMI) of 33.0 to 33.9 in adult - Primary    Relevant Medications    Semaglutide-Weight Management (WEGOVY) 2.4 MG/0.75ML pen      Transition to Wegovy 2.4mg once weekly with cash pay coupon.   Bren Beebe RD next available   Wendy Agosto in 3 months       INTERVAL HISTORY:  [facility] on [facility]24. Referrals placed for health , health psychology and get moving program. Decided to hold off on AOMs. Really focused on life style changes - lost 17lbs. Continued to have increase hunger and weight stall.   Last seen by Carole Guo PA-C on 9/3/2024 and started on Wegovy through [facility] pharmacist.   [employer] employee. Starting BMI >40  Insurance no longer covered Wegovy, transitioned to Compounded Semaglutide       Is really happy with her results. Would like to continue to lose weight.     Anti-obesity medication history    Current:   Compounded Semaglutide 34units - taking every 10 days. Has 2 weeks left. No side  effects. No constipation. Still helpful. Having some hunger by the end of the week.    Would like to transition to another GLP-1    Recent diet changes: has been tracking again - which has been helpful. 60g of protein on average. Eating 3 meals a day. Some snacks. Drinking lots of water. Coffee and celesuius in the morning.     Recent exercise/activity changes: Golfing 2xweek, weight lifting 1-2xweek, andry.     Recent stressors: some increase stress, but tolerating well.     Recent sleep changes: has not been on consistent with CPAP. Continues to be seen by Sleep       CURRENT WEIGHT:   238 lbs 6.4 oz    Initial Weight (lbs): 294 lbs  Last Visits Weight: 112.7 kg (248 lb 6.4 oz)  Cumulative weight loss (lbs): 55.6  Weight Loss Percentage: 18.91%  Waist Circumference (cm): 97 cm    Wt Readings from Last 5 Encounters:   05/29/25 108.1 kg (238 lb 6.4 oz)   04/21/25 112.7 kg (248 lb 6.4 oz)   03/17/25 112.5 kg (248 lb)   02/19/25 115.3 kg (254 lb 1.6 oz)   01/14/25 115.7 kg (255 lb)             5/28/2025     5:17 PM   Changes and Difficulties   I have made the following changes to my diet since my last visit: Staying on course. Logging my meals right now.   With regards to my diet, I am still struggling with: Some hunger   I have made the following changes to my activity/exercise since my last visit: Exercising 4 times per week   With regards to my activity/exercise, I am still struggling with: Knee pain         MEDICATIONS:   Current Outpatient Medications   Medication Sig Dispense Refill     acetaminophen (TYLENOL) 500 MG tablet Take 500-1,000 mg by mouth every 6 hours as needed for mild pain       COMPOUNDED NON-CONTROLLED SUBSTANCE (CMPD RX) - PHARMACY TO MIX COMPOUNDED MEDICATION Compound semaglutide 1.7 mg (34 units) once weekly. OK to compound during Wegovy shortage. 2 mL 2     diclofenac (VOLTAREN) 1 % topical gel Apply 4 grams to knees up to four times daily using enclosed dosing card. 100 g 1     ibuprofen  "(ADVIL/MOTRIN) 200 MG tablet Take 2-3 tablets by mouth every 6 hours as needed.       multivitamin w/minerals (THERA-VIT-M) tablet Take 1 tablet by mouth daily       Semaglutide-Weight Management (WEGOVY) 2.4 MG/0.75ML pen Inject 2.4 mg subcutaneously once a week. 3 mL 3     valACYclovir (VALTREX) 1000 mg tablet Take 2 tablets (2,000 mg) by mouth 2 times daily. 4 tablet 4     calcium carbonate-vitamin D (CALTRATE) 600-10 MG-MCG per tablet Take 1 tablet by mouth daily.       docusate sodium (COLACE) 100 MG capsule Take 100 mg by mouth 2 times daily as needed for constipation.       melatonin 5 MG tablet Take 5 mg by mouth nightly as needed for sleep       Semaglutide-Weight Management (WEGOVY) 1 MG/0.5ML pen Inject 1 mg subcutaneously once a week. 6 mL 0     triamcinolone (KENALOG) 0.1 % external ointment Apply topically 2 times daily. 160 g 5           5/28/2025     5:17 PM   Weight Loss Medication History Reviewed With Patient   Which weight loss medications are you currently taking on a regular basis? Wegovy   Are you having any side effects from the weight loss medication that we have prescribed you? No       Objective   /86 (BP Location: Left arm, Patient Position: Sitting, Cuff Size: Adult Large)   Pulse 90   Ht 1.803 m (5' 11\")   Wt 108.1 kg (238 lb 6.4 oz)   LMP 08/01/2016 (Approximate)   SpO2 99%   BMI 33.25 kg/m        PHYSICAL EXAM:  GENERAL: alert and no distress  EYES: Eyes grossly normal to inspection.  No discharge or erythema, or obvious scleral/conjunctival abnormalities.  RESP: No audible wheeze, cough, or visible cyanosis.    SKIN: Visible skin clear. No significant rash, abnormal pigmentation or lesions.  NEURO: Cranial nerves grossly intact.  Mentation and speech appropriate for age.  PSYCH: Appropriate affect, tone, and pace of words        Sincerely,    Wendy Gonzalez PA-C      26 minutes spent by me on the date of the encounter doing chart review, history and exam, documentation " and further activities per the note    The longitudinal plan of care for the diagnosis(es)/condition(s) as documented were addressed during this visit. Due to the added complexity in care, I will continue to support Jacqui in the subsequent management and with ongoing continuity of care.    Again, thank you for allowing me to participate in the care of your patient.      Sincerely,    Wendy Gonzalez PA-C

## 2025-05-29 NOTE — LETTER
5/29/2025       RE: Jacqui Grijalva  [address]  [place] [address]     Dear Colleague,    Thank you for referring your patient, Jacqui Grijalva, to the [facility] DERMATOLOGY CLINIC [place] at [facility]. Please see a copy of my visit note below.    [facility] Dermatology Note  Encounter Date: May 29, 2025  Office Visit     Dermatology Problem List:  1. Melanoma    - Left lower leg (Excised Fall 2023 at [facility])  2. SCC   - R lower leg (Excised Fall 2023 at [facility])  3. BCC   - L upper thigh (biopsied Fall 2023 at [facility], excised 05/06/24)  - L cheek (Excised Fall 2023 at [facility])  - R forearm (Excised ~2010)  - Upper chest (Excised ~2010)  4. Atypical Nevus   - L inferior axilla (Excised Fall 2023 at [facility])   5. Lichenoid Keratosis   - R chest, biopsied 02/29/24  6. Porokeratosis  - Reassured benign  7. Likely hidrocystoma   - L lower eyelid, reassured benign   8. Scar pain  - Triamcinolone 0.1% ointment  9. Inflamed acrochordon  10. EIC  ____________________________________________    Assessment & Plan:  # Symptomatic Sks, back and thigh, s/p cryotherapy 5/29/25  - Verbal consent for the procedure was obtained from the patient.  - Cryotherapy performed today (see procedure note below)    # Skin colored papule, R medial lower eyelid  Previously favored as hidrocystoma, and appears unchanged compared to prior pictures in chart.  Updated photos taken today. Patient continues to worry about the spot.  Discussed potential for scarring with the patient in light of recent events coming up, and plan will be to continue to monitor.  Provided reassurance.    - Consider biopsy at next follow up    # History of Melanoma, L lower leg   Focal areas of regular pigmentation noted on dermoscopy, consistent with prior finding of scar with pigmented  streaking likely reactive pigmentation. No evidence of recurrence of disease.   - Sun protection: Counseled SPF30+ sunscreen, UPF clothing, sun avoidance, tanning bed avoidance.  - Continue regular skin checks every six months.  Discussed need for approximately 2 more visits at the 6-month interval follow-up and then can graduate to annual skin exams.     # Hx multiple NMSC  # Screening for skin cancer  # Benign skin findings: Seborrheic keratosis, solar lentigines, cherry angiomas  No evidence of recurrence of BCC in L upper thigh.  Healing well. Reviewed sun protection and signs to watch for (painful, non healing lesions).  Discussed etiology and course of benign skin findings and provided reassurance with the patient.  -Continue to monitor at home for new or changing lesions      Procedures Performed:   - Cryotherapy procedure note, location(s): right upper lateral back, left posterior thigh. After verbal consent and discussion of risks and benefits including, but not limited to, dyspigmentation/scar, blister, and pain, 2 lesion(s) was(were) treated with 1-2 mm freeze border for 1-2 cycles with liquid nitrogen. Post cryotherapy instructions were provided.  - Procedure done by faculty    Follow-up: 6 month(s) in-person, or earlier for new or changing lesions    Staff and Medical Student:     Precious Kelsey, MS  MS4 Dermatology      I was present with the medical student who participated in the service and in the documentation.  I have verified the history and personally performed the physical exam and medical decision making.  I agree with the assessment and plan of care as documented in the note.  I personally performed all procedures.    Fabrice Valera MD  Dermatology Attending    ____________________________________________    CC: Derm Problem (FBSE- spots of concern on L leg, R shoulder)    HPI:  Ms. Jacqui Grijalva is a(n) 60 year old female who presents today as a return patient for FBSE.  Patient was last  seen in the office on 9/26/2024 for full-body skin exam.    Today, patient reports that overall she has been doing well and without any major changes in her health since her last visit.  She has a couple of areas for us to look at, although is not overly concerned about them.  1 spot on her left leg and right shoulder are irritating and symptomatic.  Denies any new or changing lesions.  Denies any lesions that are itching, burning, painful, or bleeding.  Expresses dislike of the coloration of some of her scars, particularly of the lower extremities.  Otherwise no new concerns.  Endorses daily sunscreen use.  Reports family history of skin cancers in her father, unknown type. Per prior note history of tanning bed exposure (~100).     Patient is otherwise feeling well, without additional skin concerns.    Labs:  N/A    Physical Exam:  Vitals: LMP 08/01/2016 (Approximate)   SKIN: Full skin, which includes the head/face, both arms, chest, back, abdomen,both legs, genitalia and/or groin buttocks, digits and/or nails, was examined.  - There are multiple, well-demarcated, dark brown/purple patches of fibrotic tissue consistent with well-healing scars from previous excisions on the right lower leg, right arm, left cheek, right chest.  She has another scar from prior ankle surgery neck consists of hypopigmented linear patch across across the left lateral ankle.  - Right lower leg has a well-demarcated patch of fibrotic tissue with pigment streaking, appearing tan/light purple, consistent with well healed scar from prior excision.  - Multiple well-defined dome shaped bright red papules on the trunk.   - Multiple regular brown pigmented macules and papules are identified on the trunk and extremities.   - Scattered brown macules on sun exposed areas.  - There are waxy stuck on tan to brown papule on the trunk, buttock and lower extremities.  - Soft flat-topped skin colored papule beneath medial right lower eyelid.  - No other  lesions of concern on areas examined.     Medications:  Current Outpatient Medications   Medication Sig Dispense Refill     acetaminophen (TYLENOL) 500 MG tablet Take 500-1,000 mg by mouth every 6 hours as needed for mild pain       COMPOUNDED NON-CONTROLLED SUBSTANCE (CMPD RX) - PHARMACY TO MIX COMPOUNDED MEDICATION Compound semaglutide 1.7 mg (34 units) once weekly. OK to compound during Wegovy shortage. 2 mL 2     diclofenac (VOLTAREN) 1 % topical gel Apply 4 grams to knees up to four times daily using enclosed dosing card. 100 g 1     ibuprofen (ADVIL/MOTRIN) 200 MG tablet Take 2-3 tablets by mouth every 6 hours as needed.       multivitamin w/minerals (THERA-VIT-M) tablet Take 1 tablet by mouth daily       valACYclovir (VALTREX) 1000 mg tablet Take 2 tablets (2,000 mg) by mouth 2 times daily. 4 tablet 4     calcium carbonate-vitamin D (CALTRATE) 600-10 MG-MCG per tablet Take 1 tablet by mouth daily.       docusate sodium (COLACE) 100 MG capsule Take 100 mg by mouth 2 times daily as needed for constipation.       melatonin 5 MG tablet Take 5 mg by mouth nightly as needed for sleep       Semaglutide-Weight Management (WEGOVY) 1 MG/0.5ML pen Inject 1 mg subcutaneously once a week. 6 mL 0     Semaglutide-Weight Management (WEGOVY) 2.4 MG/0.75ML pen Inject 2.4 mg subcutaneously once a week. 3 mL 3     triamcinolone (KENALOG) 0.1 % external ointment Apply topically 2 times daily. 160 g 5     No current facility-administered medications for this visit.      Past Medical History:   Patient Active Problem List   Diagnosis     Tear film insufficiency     Chronic allergic conjunctivitis - Both Eyes     Post-traumatic osteoarthritis of right knee     Ankle pain, left     Adjustment disorder with anxious mood     Melanoma of skin (H)     Neoplasm of uncertain behavior of skin of chest     Basal cell carcinoma (BCC) of left thigh     GERD (gastroesophageal reflux disease)     History of melanoma     Scar irritation      Inflamed acrochordon     Class 1 obesity with serious comorbidity and body mass index (BMI) of 33.0 to 33.9 in adult     Obstructive sleep apnea syndrome     Metabolic dysfunction-associated steatotic liver disease (MASLD)     Past Medical History:   Diagnosis Date     Pain in joint, ankle and foot, left     traumatic injury in childhood     Post-traumatic osteoarthritis of right knee 07/11/2016     CC Fabrice Valera MD  [address] [place] [phone] on close of this encounter.      Again, thank you for allowing me to participate in the care of your patient.      Sincerely,    Fabrice Valera MD

## 2025-05-29 NOTE — NURSING NOTE
Dermatology Rooming Note    Jacqui Grijalva's goals for this visit include:   Chief Complaint   Patient presents with    Derm Problem     FBSE- spots of concern on L leg, R shoulder     YANELY Rosales

## 2025-05-29 NOTE — PROGRESS NOTES
[facility] Dermatology Note  Encounter Date: May 29, 2025  Office Visit     Dermatology Problem List:  1. Melanoma    - Left lower leg (Excised Fall 2023 at [facility])  2. SCC   - R lower leg (Excised Fall 2023 at [facility])  3. BCC   - L upper thigh (biopsied Fall 2023 at [facility], excised 05/06/24)  - L cheek (Excised Fall 2023 at [facility])  - R forearm (Excised ~2010)  - Upper chest (Excised ~2010)  4. Atypical Nevus   - L inferior axilla (Excised Fall 2023 at [facility])   5. Lichenoid Keratosis   - R chest, biopsied 02/29/24  6. Porokeratosis  - Reassured benign  7. Likely hidrocystoma   - L lower eyelid, reassured benign   8. Scar pain  - Triamcinolone 0.1% ointment  9. Inflamed acrochordon  10. EIC  ____________________________________________    Assessment & Plan:  # Symptomatic Sks, back and thigh, s/p cryotherapy 5/29/25  - Verbal consent for the procedure was obtained from the patient.  - Cryotherapy performed today (see procedure note below)    # Skin colored papule, R medial lower eyelid  Previously favored as hidrocystoma, and appears unchanged compared to prior pictures in chart.  Updated photos taken today. Patient continues to worry about the spot.  Discussed potential for scarring with the patient in light of recent events coming up, and plan will be to continue to monitor.  Provided reassurance.    - Consider biopsy at next follow up    # History of Melanoma, L lower leg   Focal areas of regular pigmentation noted on dermoscopy, consistent with prior finding of scar with pigmented streaking likely reactive pigmentation. No evidence of recurrence of disease.   - Sun protection: Counseled SPF30+ sunscreen, UPF clothing, sun avoidance, tanning bed avoidance.  - Continue regular skin checks every six months.  Discussed need for approximately 2 more visits at the 6-month interval follow-up and then can graduate  to annual skin exams.     # Hx multiple NMSC  # Screening for skin cancer  # Benign skin findings: Seborrheic keratosis, solar lentigines, cherry angiomas  No evidence of recurrence of BCC in L upper thigh.  Healing well. Reviewed sun protection and signs to watch for (painful, non healing lesions).  Discussed etiology and course of benign skin findings and provided reassurance with the patient.  -Continue to monitor at home for new or changing lesions      Procedures Performed:   - Cryotherapy procedure note, location(s): right upper lateral back, left posterior thigh. After verbal consent and discussion of risks and benefits including, but not limited to, dyspigmentation/scar, blister, and pain, 2 lesion(s) was(were) treated with 1-2 mm freeze border for 1-2 cycles with liquid nitrogen. Post cryotherapy instructions were provided.  - Procedure done by faculty    Follow-up: 6 month(s) in-person, or earlier for new or changing lesions    Staff and Medical Student:     Precious Kelsey, MS  MS4 Dermatology      I was present with the medical student who participated in the service and in the documentation.  I have verified the history and personally performed the physical exam and medical decision making.  I agree with the assessment and plan of care as documented in the note.  I personally performed all procedures.    Fabrice Valera MD  Dermatology Attending    ____________________________________________    CC: Derm Problem (FBSE- spots of concern on L leg, R shoulder)    HPI:  Ms. Jacqui Grijalva is a(n) 60 year old female who presents today as a return patient for FBSE.  Patient was last seen in the office on 9/26/2024 for full-body skin exam.    Today, patient reports that overall she has been doing well and without any major changes in her health since her last visit.  She has a couple of areas for us to look at, although is not overly concerned about them.  1 spot on her left leg and right shoulder are  irritating and symptomatic.  Denies any new or changing lesions.  Denies any lesions that are itching, burning, painful, or bleeding.  Expresses dislike of the coloration of some of her scars, particularly of the lower extremities.  Otherwise no new concerns.  Endorses daily sunscreen use.  Reports family history of skin cancers in her father, unknown type. Per prior note history of tanning bed exposure (~100).     Patient is otherwise feeling well, without additional skin concerns.    Labs:  N/A    Physical Exam:  Vitals: LMP 08/01/2016 (Approximate)   SKIN: Full skin, which includes the head/face, both arms, chest, back, abdomen,both legs, genitalia and/or groin buttocks, digits and/or nails, was examined.  - There are multiple, well-demarcated, dark brown/purple patches of fibrotic tissue consistent with well-healing scars from previous excisions on the right lower leg, right arm, left cheek, right chest.  She has another scar from prior ankle surgery neck consists of hypopigmented linear patch across across the left lateral ankle.  - Right lower leg has a well-demarcated patch of fibrotic tissue with pigment streaking, appearing tan/light purple, consistent with well healed scar from prior excision.  - Multiple well-defined dome shaped bright red papules on the trunk.   - Multiple regular brown pigmented macules and papules are identified on the trunk and extremities.   - Scattered brown macules on sun exposed areas.  - There are waxy stuck on tan to brown papule on the trunk, buttock and lower extremities.  - Soft flat-topped skin colored papule beneath medial right lower eyelid.  - No other lesions of concern on areas examined.     Medications:  Current Outpatient Medications   Medication Sig Dispense Refill    acetaminophen (TYLENOL) 500 MG tablet Take 500-1,000 mg by mouth every 6 hours as needed for mild pain      COMPOUNDED NON-CONTROLLED SUBSTANCE (CMPD RX) - PHARMACY TO MIX COMPOUNDED MEDICATION Compound  semaglutide 1.7 mg (34 units) once weekly. OK to compound during Wegovy shortage. 2 mL 2    diclofenac (VOLTAREN) 1 % topical gel Apply 4 grams to knees up to four times daily using enclosed dosing card. 100 g 1    ibuprofen (ADVIL/MOTRIN) 200 MG tablet Take 2-3 tablets by mouth every 6 hours as needed.      multivitamin w/minerals (THERA-VIT-M) tablet Take 1 tablet by mouth daily      valACYclovir (VALTREX) 1000 mg tablet Take 2 tablets (2,000 mg) by mouth 2 times daily. 4 tablet 4    calcium carbonate-vitamin D (CALTRATE) 600-10 MG-MCG per tablet Take 1 tablet by mouth daily.      docusate sodium (COLACE) 100 MG capsule Take 100 mg by mouth 2 times daily as needed for constipation.      melatonin 5 MG tablet Take 5 mg by mouth nightly as needed for sleep      Semaglutide-Weight Management (WEGOVY) 1 MG/0.5ML pen Inject 1 mg subcutaneously once a week. 6 mL 0    Semaglutide-Weight Management (WEGOVY) 2.4 MG/0.75ML pen Inject 2.4 mg subcutaneously once a week. 3 mL 3    triamcinolone (KENALOG) 0.1 % external ointment Apply topically 2 times daily. 160 g 5     No current facility-administered medications for this visit.      Past Medical History:   Patient Active Problem List   Diagnosis    Tear film insufficiency    Chronic allergic conjunctivitis - Both Eyes    Post-traumatic osteoarthritis of right knee    Ankle pain, left    Adjustment disorder with anxious mood    Melanoma of skin (H)    Neoplasm of uncertain behavior of skin of chest    Basal cell carcinoma (BCC) of left thigh    GERD (gastroesophageal reflux disease)    History of melanoma    Scar irritation    Inflamed acrochordon    Class 1 obesity with serious comorbidity and body mass index (BMI) of 33.0 to 33.9 in adult    Obstructive sleep apnea syndrome    Metabolic dysfunction-associated steatotic liver disease (MASLD)     Past Medical History:   Diagnosis Date    Pain in joint, ankle and foot, left     traumatic injury in childhood    Post-traumatic  osteoarthritis of right knee 07/11/2016     CC Fabrice Valera MD  [address]  [place] [address] on close of this encounter.

## 2025-05-30 ENCOUNTER — VIRTUAL VISIT (OUTPATIENT)
Dept: ENDOCRINOLOGY | Facility: CLINIC | Age: 60
End: 2025-05-30

## 2025-05-30 DIAGNOSIS — E66.3 OVERWEIGHT: Primary | ICD-10-CM

## 2025-05-30 PROCEDURE — 99207 PR NO CHARGE LOS: CPT | Mod: 95

## 2025-05-30 NOTE — LETTER
5/30/2025       RE: Jacqui Grijalva  [address]  [place] [address]     Dear Colleague,    Thank you for referring your patient, Jacqui Grijalva, to the [facility] [place] at [facility]. Please see a copy of my visit note below.    6 pack visit #1  Hjelm  NO CHARGE TODAY: billing code not available    CW; 238 lb  I am hopeful. What is working? I am tracking my food regularly on an ap; I am doing my exercise plan.  My goal for my 10 day trip is to maintain my weight; I will limit to 2 drinks and make sure to have protein at breakfast to start the day off right and have protein snacks available.  I am proud that I have never been so active; I am golfing twice per week; weight lifting and doing Lyudmila 1-2 times per week. I am feeling empowered and strong with my activity.  I will assume the best with my sister; I will will apologize and move forward and remind myself she is doing her best and she loves me and has loved me since the day she was born. I am not going to be judging or controlling. I will assume the best.    NEXT HEALTH  VISIT: JUNE 27 at 1 pm    Meri Greenberg  Counts include 234 beds at the [facility], National Board Certified Health &   Lead Health   [facility]  [email or web address]   To schedule: [phone]   Gender pronouns: she/her       Again, thank you for allowing me to participate in the care of your patient.      Sincerely,    Meri Greenberg

## 2025-06-02 NOTE — PROGRESS NOTES
6 pack visit #1  Carlos  NO CHARGE TODAY: billing code not available    CW; 238 lb  I am hopeful. What is working? I am tracking my food regularly on an ap; I am doing my exercise plan.  My goal for my 10 day trip is to maintain my weight; I will limit to 2 drinks and make sure to have protein at breakfast to start the day off right and have protein snacks available.  I am proud that I have never been so active; I am golfing twice per week; weight lifting and doing Lyudmila 1-2 times per week. I am feeling empowered and strong with my activity.  I will assume the best with my sister; I will will apologize and move forward and remind myself she is doing her best and she loves me and has loved me since the day she was born. I am not going to be judging or controlling. I will assume the best.    NEXT HEALTH  VISIT: JUNE 27 at 1 pm    Meri Greenberg  [facility], National Board Certified Health &   Lead Health   [facility]  [email or web address]   To schedule: [phone]   Gender pronouns: she/her

## 2025-06-16 PROBLEM — Z85.828 HISTORY OF NONMELANOMA SKIN CANCER: Status: ACTIVE | Noted: 2025-06-16

## 2025-06-16 PROBLEM — L82.0 SEBORRHEIC KERATOSES, INFLAMED: Status: ACTIVE | Noted: 2025-06-16

## 2025-06-24 ENCOUNTER — TELEPHONE (OUTPATIENT)
Dept: GASTROENTEROLOGY | Facility: CLINIC | Age: 60
End: 2025-06-24
Payer: COMMERCIAL

## 2025-06-24 DIAGNOSIS — Z12.11 SPECIAL SCREENING FOR MALIGNANT NEOPLASMS, COLON: Primary | ICD-10-CM

## 2025-06-24 RX ORDER — BISACODYL 5 MG/1
TABLET, DELAYED RELEASE ORAL
Qty: 4 TABLET | Refills: 0 | Status: SHIPPED | OUTPATIENT
Start: 2025-06-24

## 2025-06-24 NOTE — TELEPHONE ENCOUNTER
Pre visit planning completed.      Procedure details:    Patient scheduled for Colonoscopy on 7/16/25.     Arrival time: 0700. Procedure time 0800    Facility location: [facility]; [address], [place] [address]. Check in location: Main entrance at registration desk.  *Disclaimer: Drivers are to check in with patient and stay on campus during procedure.     Sedation type: Conscious sedation   Noted significant looping per 7/23/25 progress notes with cs. No noted recommendation for MAC sedation for future colonoscopies. Could keep colonoscopy as scheduled with cs due to limited MAC availability. Please discuss with patient if any concerns with cs.    Pre op exam needed? No.    Indication for procedure: Screen      Chart review:     Electronic implanted devices? No    Recent diagnosis of diverticulitis within the last 6 weeks? No      Medication review:    Diabetic? No    Anticoagulants? No    Weight loss medication/injectable? Yes. Compounded Semaglutide-Weight Management (WEGOVY). Weekly dosing of medication.  HOLD 7 days before procedure.  Follow up with managing provider.     Other medication HOLDING recommendations:  N/A      Prep for procedure:     Bowel prep recommendation: Extended Golytely. Bowel prep sent to    [id number] - [place], [place] - [address] [place] [address] AT [facility] [address]    Due to: GLP-1 agonist medication noted in chart    Procedure information and instructions sent via usha Murphy RN  Endoscopy Procedure Pre Assessment   [phone] option 3

## 2025-06-25 NOTE — TELEPHONE ENCOUNTER
Attempted to contact patient in order to complete pre assessment questions.     No answer. Left message to return call to [phone] option 3.    Callback communication sent via Drimki.    Brandie Bertrand LPN

## 2025-06-26 NOTE — TELEPHONE ENCOUNTER
Pre assessment completed for upcoming procedure.   (Please see previous telephone encounter notes for complete details)    Patient returned call.       Procedure details:    Procedure date 7/16, arrival time 0700 and facility location reviewed.    Pre op exam needed? No.    Designated  policy reviewed and that site requests drivers to check in and stay on campus. Instructed to have someone stay 6  hours post procedure.   *Disclaimer - please notify the [facility] Op Manager with any  issues/concerns.    Medication review:    Medications reviewed. Please see supporting documentation below. Holding recommendations discussed (if applicable).       Prep for procedure:    Procedure prep instructions reviewed.        Any additional information needed:  I spoke to patient regarding sedation. Patient states that she is comfortable moving forward with CS as scheduled.      Patient verbalized understanding and had no questions or concerns at this time.      Merced Moody LPN  Endoscopy Procedure Pre Assessment   [phone] option 3

## 2025-07-16 ENCOUNTER — HOSPITAL ENCOUNTER (OUTPATIENT)
Facility: CLINIC | Age: 60
Discharge: HOME | End: 2025-07-16
Attending: INTERNAL MEDICINE | Admitting: INTERNAL MEDICINE
Payer: COMMERCIAL

## 2025-07-16 VITALS
HEART RATE: 69 BPM | SYSTOLIC BLOOD PRESSURE: 140 MMHG | DIASTOLIC BLOOD PRESSURE: 84 MMHG | RESPIRATION RATE: 14 BRPM | OXYGEN SATURATION: 97 %

## 2025-07-16 LAB — COLONOSCOPY: NORMAL

## 2025-07-16 PROCEDURE — G0500 MOD SEDAT ENDO SERVICE >5YRS: HCPCS | Performed by: INTERNAL MEDICINE

## 2025-07-16 PROCEDURE — 45385 COLONOSCOPY W/LESION REMOVAL: CPT | Performed by: INTERNAL MEDICINE

## 2025-07-16 PROCEDURE — 250N000011 HC RX IP 250 OP 636: Performed by: INTERNAL MEDICINE

## 2025-07-16 PROCEDURE — 99153 MOD SED SAME PHYS/QHP EA: CPT | Performed by: INTERNAL MEDICINE

## 2025-07-16 PROCEDURE — 88305 TISSUE EXAM BY PATHOLOGIST: CPT | Mod: TC | Performed by: INTERNAL MEDICINE

## 2025-07-16 PROCEDURE — 88305 TISSUE EXAM BY PATHOLOGIST: CPT | Mod: 26 | Performed by: STUDENT IN AN ORGANIZED HEALTH CARE EDUCATION/TRAINING PROGRAM

## 2025-07-16 RX ORDER — NALOXONE HYDROCHLORIDE 0.4 MG/ML
0.2 INJECTION, SOLUTION INTRAMUSCULAR; INTRAVENOUS; SUBCUTANEOUS
Status: DISCONTINUED | OUTPATIENT
Start: 2025-07-16 | End: 2025-07-16 | Stop reason: HOSPADM

## 2025-07-16 RX ORDER — FENTANYL CITRATE 50 UG/ML
INJECTION, SOLUTION INTRAMUSCULAR; INTRAVENOUS PRN
Status: DISCONTINUED | OUTPATIENT
Start: 2025-07-16 | End: 2025-07-16 | Stop reason: HOSPADM

## 2025-07-16 RX ORDER — PROCHLORPERAZINE MALEATE 5 MG/1
10 TABLET ORAL EVERY 6 HOURS PRN
Status: DISCONTINUED | OUTPATIENT
Start: 2025-07-16 | End: 2025-07-16 | Stop reason: HOSPADM

## 2025-07-16 RX ORDER — NALOXONE HYDROCHLORIDE 0.4 MG/ML
0.4 INJECTION, SOLUTION INTRAMUSCULAR; INTRAVENOUS; SUBCUTANEOUS
Status: DISCONTINUED | OUTPATIENT
Start: 2025-07-16 | End: 2025-07-16 | Stop reason: HOSPADM

## 2025-07-16 RX ORDER — ONDANSETRON 2 MG/ML
4 INJECTION INTRAMUSCULAR; INTRAVENOUS EVERY 6 HOURS PRN
Status: DISCONTINUED | OUTPATIENT
Start: 2025-07-16 | End: 2025-07-16 | Stop reason: HOSPADM

## 2025-07-16 RX ORDER — ONDANSETRON 2 MG/ML
4 INJECTION INTRAMUSCULAR; INTRAVENOUS
Status: DISCONTINUED | OUTPATIENT
Start: 2025-07-16 | End: 2025-07-16 | Stop reason: HOSPADM

## 2025-07-16 RX ORDER — ONDANSETRON 4 MG/1
4 TABLET, ORALLY DISINTEGRATING ORAL EVERY 6 HOURS PRN
Status: DISCONTINUED | OUTPATIENT
Start: 2025-07-16 | End: 2025-07-16 | Stop reason: HOSPADM

## 2025-07-16 RX ORDER — LIDOCAINE 40 MG/G
CREAM TOPICAL
Status: DISCONTINUED | OUTPATIENT
Start: 2025-07-16 | End: 2025-07-16 | Stop reason: HOSPADM

## 2025-07-16 RX ORDER — FLUMAZENIL 0.1 MG/ML
0.2 INJECTION, SOLUTION INTRAVENOUS
Status: DISCONTINUED | OUTPATIENT
Start: 2025-07-16 | End: 2025-07-16 | Stop reason: HOSPADM

## 2025-07-16 ASSESSMENT — ACTIVITIES OF DAILY LIVING (ADL)
ADLS_ACUITY_SCORE: 41

## 2025-07-16 NOTE — OR NURSING
Colonoscopy under conscious sedation.  Polyp removed with hot snare, ERBE settings 2/25, skin intact after grounding pad removed.  Pt tolerated procedure.

## 2025-07-16 NOTE — H&P
Gastroenterology Pre-op History and Physical    Jacqui Grijalva MRN# [id number]   Age: 60 year old YOB: 1965      Date of Surgery: 07/16/25  Location [facility]      Date of Exam 7/16/2025 Facility Same Day       Primary care provider: Bijal Browne         Chief Complaint and/or Reason for Procedure:   61 yo female for colon polyp surveillance.  Prior colonoscopy 7/2024 with three large polyps including one 30 mm in transverse with piecemeal resection which was tattooed. Second 20 mm polyp in sigmoid also tattooed.    No FH of colon cancer.  No anticoagulation.         Past Medical and Surgical History:     Past Medical History:   Diagnosis Date    Multiple adenomatous polyps 07/2024    very large polyps, villous components, advanced adenoma.    Pain in joint, ankle and foot, left     traumatic injury in childhood    Post-traumatic osteoarthritis of right knee 07/11/2016     Past Surgical History:   Procedure Laterality Date    ARTHRODESIS ANKLE Left 12/27/2023    Procedure: FUSION, JOINT, ANKLE - LEFT;  Surgeon: Aman Rincon MD;  Location: [facility] OR    COLONOSCOPY N/A 7/23/2024    Procedure: Colonoscopy;  Surgeon: Arthur Lujan MD;  Location:  GI    right knee      Meniscus injection    STRIP VEIN Bilateral             Medications (include herbals and vitamins):        Medications Prior to Admission   Medication Sig Dispense Refill Last Dose/Taking    acetaminophen (TYLENOL) 500 MG tablet Take 500-1,000 mg by mouth every 6 hours as needed for mild pain   Past Month    bisacodyl (DULCOLAX) 5 MG EC tablet Two days prior to exam take two (2) tablets at 4pm. One day prior to exam take two (2) tablets at 4pm 4 tablet 0 7/15/2025    ibuprofen (ADVIL/MOTRIN) 200 MG tablet Take 2-3 tablets by mouth every 6 hours as needed.   Past Week    multivitamin w/minerals (THERA-VIT-M) tablet Take 1 tablet by mouth daily   Past Week    polyethylene glycol (GOLYTELY) 236  g suspension Two days before procedure at 5PM fill first container with water. Mix and drink an 8 oz glass every 15 minutes until HALF of the container is gone. Place the remainder in the refrigerator. One day before procedure at 5PM drink second half of bowel prep. Drink an 8 oz glass every 15 minutes until it is gone. Day of procedure 6 hours before arrival time fill the 2nd container with water. Mix and drink an 8 oz glass every 15 minutes until HALF of the container is gone. Discard the remaining solution. 8000 mL 0 7/15/2025    Semaglutide-Weight Management (WEGOVY) 2.4 MG/0.75ML pen Inject 2.4 mg subcutaneously once a week. 3 mL 3 7/7/2025    valACYclovir (VALTREX) 1000 mg tablet Take 2 tablets (2,000 mg) by mouth 2 times daily. 4 tablet 4 More than a month    calcium carbonate-vitamin D (CALTRATE) 600-10 MG-MCG per tablet Take 1 tablet by mouth daily.       COMPOUNDED NON-CONTROLLED SUBSTANCE (CMPD RX) - PHARMACY TO MIX COMPOUNDED MEDICATION Compound semaglutide 1.7 mg (34 units) once weekly. OK to compound during Wegovy shortage. 2 mL 2     diclofenac (VOLTAREN) 1 % topical gel Apply 4 grams to knees up to four times daily using enclosed dosing card. 100 g 1 Unknown    docusate sodium (COLACE) 100 MG capsule Take 100 mg by mouth 2 times daily as needed for constipation.       melatonin 5 MG tablet Take 5 mg by mouth nightly as needed for sleep       Semaglutide-Weight Management (WEGOVY) 1 MG/0.5ML pen Inject 1 mg subcutaneously once a week. 6 mL 0     triamcinolone (KENALOG) 0.1 % external ointment Apply topically 2 times daily. 160 g 5              Allergies:      Allergies   Allergen Reactions    Pcn [Penicillins]                Physical Exam:   All vitals have been reviewed  Patient Vitals for the past 8 hrs:   BP Pulse Resp SpO2   07/16/25 0708 133/88 76 18 98 %     No intake/output data recorded.  Airway assessment:   Patient is able to open mouth wide  Patient is able to stick out tongue  Mallampatti  classification: Class I (visualization of the soft palate, fauces, uvula, anterior and posterior pillars)}      Lungs:   No increased work of breathing, good air exchange, clear to auscultation bilaterally, no crackles or wheezing     Cardiovascular:   regular rate and rhythm and normal S1 and S2                 Anesthetic risk and/or ASA classification:   ASA 1    Joaquin Cervantes MD

## 2025-07-21 ENCOUNTER — PATIENT OUTREACH (OUTPATIENT)
Dept: CARE COORDINATION | Facility: CLINIC | Age: 60
End: 2025-07-21
Payer: COMMERCIAL

## 2025-07-30 PROBLEM — D12.6 TUBULAR ADENOMA OF COLON: Status: ACTIVE | Noted: 2025-07-30

## 2025-08-08 ENCOUNTER — VIRTUAL VISIT (OUTPATIENT)
Dept: ENDOCRINOLOGY | Facility: CLINIC | Age: 60
End: 2025-08-08

## 2025-08-08 DIAGNOSIS — E66.3 OVERWEIGHT (BMI 25.0-29.9): Primary | ICD-10-CM

## 2025-08-08 PROCEDURE — 99207 PR NO CHARGE LOS: CPT | Mod: 95

## (undated) DEVICE — PACK SET-UP STD 9102

## (undated) DEVICE — LINEN ORTHO PACK 5446

## (undated) DEVICE — STRAP KNEE/BODY 31143004

## (undated) DEVICE — CAST FIBERGLASS 3" ROLL WHITE 73458-00002-00

## (undated) DEVICE — SOL WATER IRRIG 1000ML BOTTLE 2F7114

## (undated) DEVICE — CAST FIBERGLASS 2' ROLL PURPLE 73458-00060-00

## (undated) DEVICE — DRAPE C-ARM MINI 54X85" 07-CA600

## (undated) DEVICE — DRILL BIT QUICK CONNECT ZIM 2.5X110MM

## (undated) DEVICE — IMM LEG ELEVATOR 79-90191

## (undated) DEVICE — SOL NACL 0.9% IRRIG 1000ML BOTTLE 2F7124

## (undated) DEVICE — SU ETHILON 3-0 PS-1 18" 1663H

## (undated) DEVICE — BLADE KNIFE SURG 10 371110

## (undated) DEVICE — BLADE SAW SAGITTAL STRK 25X90X1.27MM HD SYS 6 6125-127-090

## (undated) DEVICE — CLIP HEMOSTASIS ASSURANCE W16 MM BX00711884

## (undated) DEVICE — DRSG GAUZE 4X8" NON21842

## (undated) DEVICE — DECANTER BAG 2002S

## (undated) DEVICE — STPL SKIN 35W ROTATING HEAD PRW35

## (undated) DEVICE — GLOVE BIOGEL PI ULTRATOUCH G SZ 7.5 42175

## (undated) DEVICE — PACK LOWER EXTREMITY RIVERSIDE SOP32LEFSX

## (undated) DEVICE — ESU GROUND PAD ADULT W/CORD E7507

## (undated) DEVICE — GOWN XLG DISP 9545

## (undated) DEVICE — GLOVE BIOGEL PI MICRO INDICATOR UNDERGLOVE SZ 8.0 48980

## (undated) DEVICE — SUCTION MANIFOLD NEPTUNE 2 SYS 4 PORT 0702-020-000

## (undated) DEVICE — CLIP HEMOSTASIS ASSURANCE W18 MM 00711885

## (undated) DEVICE — SUTURE VICRYL+ 2-0 CT-2 27" UND VCP269H

## (undated) DEVICE — TOURNIQUET CUFF 34" REPRO BROWN 60-7070-106

## (undated) RX ORDER — FENTANYL CITRATE 50 UG/ML
INJECTION, SOLUTION INTRAMUSCULAR; INTRAVENOUS
Status: DISPENSED
Start: 2025-07-16

## (undated) RX ORDER — ONDANSETRON 2 MG/ML
INJECTION INTRAMUSCULAR; INTRAVENOUS
Status: DISPENSED
Start: 2023-12-27

## (undated) RX ORDER — FENTANYL CITRATE 50 UG/ML
INJECTION, SOLUTION INTRAMUSCULAR; INTRAVENOUS
Status: DISPENSED
Start: 2023-12-27

## (undated) RX ORDER — TRIAMCINOLONE ACETONIDE 40 MG/ML
INJECTION, SUSPENSION INTRA-ARTICULAR; INTRAMUSCULAR
Status: DISPENSED
Start: 2025-05-05

## (undated) RX ORDER — TRIAMCINOLONE ACETONIDE 40 MG/ML
INJECTION, SUSPENSION INTRA-ARTICULAR; INTRAMUSCULAR
Status: DISPENSED
Start: 2018-08-27

## (undated) RX ORDER — FENTANYL CITRATE 50 UG/ML
INJECTION, SOLUTION INTRAMUSCULAR; INTRAVENOUS
Status: DISPENSED
Start: 2024-07-23

## (undated) RX ORDER — HYDROMORPHONE HYDROCHLORIDE 1 MG/ML
INJECTION, SOLUTION INTRAMUSCULAR; INTRAVENOUS; SUBCUTANEOUS
Status: DISPENSED
Start: 2023-12-27

## (undated) RX ORDER — HYDROCODONE BITARTRATE AND ACETAMINOPHEN 5; 325 MG/1; MG/1
TABLET ORAL
Status: DISPENSED
Start: 2023-12-27

## (undated) RX ORDER — TRIAMCINOLONE ACETONIDE 40 MG/ML
INJECTION, SUSPENSION INTRA-ARTICULAR; INTRAMUSCULAR
Status: DISPENSED
Start: 2023-10-06

## (undated) RX ORDER — ALBUTEROL SULFATE 90 UG/1
AEROSOL, METERED RESPIRATORY (INHALATION)
Status: DISPENSED
Start: 2023-12-27

## (undated) RX ORDER — CEFAZOLIN SODIUM/WATER 3 G/30 ML
SYRINGE (ML) INTRAVENOUS
Status: DISPENSED
Start: 2023-12-27

## (undated) RX ORDER — LIDOCAINE HYDROCHLORIDE 10 MG/ML
INJECTION, SOLUTION EPIDURAL; INFILTRATION; INTRACAUDAL; PERINEURAL
Status: DISPENSED
Start: 2025-05-05

## (undated) RX ORDER — DEXAMETHASONE SODIUM PHOSPHATE 4 MG/ML
INJECTION, SOLUTION INTRA-ARTICULAR; INTRALESIONAL; INTRAMUSCULAR; INTRAVENOUS; SOFT TISSUE
Status: DISPENSED
Start: 2023-12-27

## (undated) RX ORDER — GLYCOPYRROLATE 0.2 MG/ML
INJECTION INTRAMUSCULAR; INTRAVENOUS
Status: DISPENSED
Start: 2023-12-27

## (undated) RX ORDER — LIDOCAINE HYDROCHLORIDE 10 MG/ML
INJECTION, SOLUTION EPIDURAL; INFILTRATION; INTRACAUDAL; PERINEURAL
Status: DISPENSED
Start: 2023-10-06

## (undated) RX ORDER — LIDOCAINE HYDROCHLORIDE 10 MG/ML
INJECTION, SOLUTION INFILTRATION; PERINEURAL
Status: DISPENSED
Start: 2018-08-27

## (undated) RX ORDER — BUPIVACAINE HYDROCHLORIDE 2.5 MG/ML
INJECTION, SOLUTION EPIDURAL; INFILTRATION; INTRACAUDAL
Status: DISPENSED
Start: 2023-10-06

## (undated) RX ORDER — PROPOFOL 10 MG/ML
INJECTION, EMULSION INTRAVENOUS
Status: DISPENSED
Start: 2023-12-27